# Patient Record
Sex: MALE | Race: AMERICAN INDIAN OR ALASKA NATIVE | NOT HISPANIC OR LATINO | Employment: OTHER | URBAN - NONMETROPOLITAN AREA
[De-identification: names, ages, dates, MRNs, and addresses within clinical notes are randomized per-mention and may not be internally consistent; named-entity substitution may affect disease eponyms.]

---

## 2021-08-19 ENCOUNTER — HOSPITAL ENCOUNTER (INPATIENT)
Facility: OTHER | Age: 66
LOS: 11 days | Discharge: NURSING, REHAB OR LONG-TERM CARE | End: 2021-08-30
Attending: INTERNAL MEDICINE | Admitting: INTERNAL MEDICINE
Payer: COMMERCIAL

## 2021-08-19 DIAGNOSIS — Z89.511 STATUS POST BELOW-KNEE AMPUTATION OF RIGHT LOWER EXTREMITY (H): ICD-10-CM

## 2021-08-19 DIAGNOSIS — L97.415 DIABETIC ULCER OF RIGHT MIDFOOT ASSOCIATED WITH TYPE 2 DIABETES MELLITUS, WITH MUSCLE INVOLVEMENT WITHOUT EVIDENCE OF NECROSIS (H): Primary | ICD-10-CM

## 2021-08-19 DIAGNOSIS — E11.621 DIABETIC ULCER OF RIGHT MIDFOOT ASSOCIATED WITH TYPE 2 DIABETES MELLITUS, WITH MUSCLE INVOLVEMENT WITHOUT EVIDENCE OF NECROSIS (H): Primary | ICD-10-CM

## 2021-08-19 DIAGNOSIS — E11.621 TYPE 2 DIABETES MELLITUS WITH FOOT ULCER, WITH LONG-TERM CURRENT USE OF INSULIN (H): ICD-10-CM

## 2021-08-19 DIAGNOSIS — L97.509 TYPE 2 DIABETES MELLITUS WITH FOOT ULCER, WITH LONG-TERM CURRENT USE OF INSULIN (H): ICD-10-CM

## 2021-08-19 DIAGNOSIS — Z79.4 TYPE 2 DIABETES MELLITUS WITH FOOT ULCER, WITH LONG-TERM CURRENT USE OF INSULIN (H): ICD-10-CM

## 2021-08-19 PROBLEM — L97.529 DIABETIC ULCER OF LEFT FOOT (H): Status: ACTIVE | Noted: 2021-08-19

## 2021-08-19 LAB
CREAT SERPL-MCNC: 0.93 MG/DL (ref 0.7–1.3)
GFR SERPL CREATININE-BSD FRML MDRD: 86 ML/MIN/1.73M2

## 2021-08-19 PROCEDURE — 36415 COLL VENOUS BLD VENIPUNCTURE: CPT | Performed by: INTERNAL MEDICINE

## 2021-08-19 PROCEDURE — 250N000012 HC RX MED GY IP 250 OP 636 PS 637: Performed by: INTERNAL MEDICINE

## 2021-08-19 PROCEDURE — 250N000013 HC RX MED GY IP 250 OP 250 PS 637: Performed by: INTERNAL MEDICINE

## 2021-08-19 PROCEDURE — 82565 ASSAY OF CREATININE: CPT | Performed by: INTERNAL MEDICINE

## 2021-08-19 PROCEDURE — 120N000001 HC R&B MED SURG/OB

## 2021-08-19 PROCEDURE — 99223 1ST HOSP IP/OBS HIGH 75: CPT | Performed by: INTERNAL MEDICINE

## 2021-08-19 PROCEDURE — 250N000011 HC RX IP 250 OP 636: Performed by: INTERNAL MEDICINE

## 2021-08-19 PROCEDURE — 258N000003 HC RX IP 258 OP 636: Performed by: INTERNAL MEDICINE

## 2021-08-19 RX ORDER — ONDANSETRON 4 MG/1
4 TABLET, ORALLY DISINTEGRATING ORAL EVERY 6 HOURS PRN
Status: DISCONTINUED | OUTPATIENT
Start: 2021-08-19 | End: 2021-08-28

## 2021-08-19 RX ORDER — LIDOCAINE 40 MG/G
CREAM TOPICAL
Status: DISCONTINUED | OUTPATIENT
Start: 2021-08-19 | End: 2021-08-28

## 2021-08-19 RX ORDER — CLOPIDOGREL BISULFATE 75 MG/1
75 TABLET ORAL DAILY
Status: DISCONTINUED | OUTPATIENT
Start: 2021-08-20 | End: 2021-08-29

## 2021-08-19 RX ORDER — ACETAMINOPHEN 325 MG/1
650 TABLET ORAL EVERY 4 HOURS PRN
Status: DISCONTINUED | OUTPATIENT
Start: 2021-08-19 | End: 2021-08-23

## 2021-08-19 RX ORDER — SODIUM CHLORIDE 9 MG/ML
INJECTION, SOLUTION INTRAVENOUS CONTINUOUS
Status: DISCONTINUED | OUTPATIENT
Start: 2021-08-19 | End: 2021-08-19

## 2021-08-19 RX ORDER — DEXTROSE MONOHYDRATE 25 G/50ML
25-50 INJECTION, SOLUTION INTRAVENOUS
Status: DISCONTINUED | OUTPATIENT
Start: 2021-08-19 | End: 2021-08-30 | Stop reason: HOSPADM

## 2021-08-19 RX ORDER — CITALOPRAM HYDROBROMIDE 10 MG/1
10 TABLET ORAL DAILY
Status: DISCONTINUED | OUTPATIENT
Start: 2021-08-20 | End: 2021-08-20

## 2021-08-19 RX ORDER — NICOTINE POLACRILEX 4 MG
15-30 LOZENGE BUCCAL
Status: DISCONTINUED | OUTPATIENT
Start: 2021-08-19 | End: 2021-08-30 | Stop reason: HOSPADM

## 2021-08-19 RX ORDER — METOPROLOL SUCCINATE 25 MG/1
25 TABLET, EXTENDED RELEASE ORAL AT BEDTIME
Status: DISCONTINUED | OUTPATIENT
Start: 2021-08-19 | End: 2021-08-30 | Stop reason: HOSPADM

## 2021-08-19 RX ORDER — ATORVASTATIN CALCIUM 40 MG/1
40 TABLET, FILM COATED ORAL EVERY EVENING
Status: DISCONTINUED | OUTPATIENT
Start: 2021-08-19 | End: 2021-08-30 | Stop reason: HOSPADM

## 2021-08-19 RX ORDER — FAMOTIDINE 20 MG/1
20 TABLET, FILM COATED ORAL 2 TIMES DAILY
Status: DISCONTINUED | OUTPATIENT
Start: 2021-08-19 | End: 2021-08-28

## 2021-08-19 RX ORDER — SODIUM CHLORIDE 9 MG/ML
INJECTION, SOLUTION INTRAVENOUS CONTINUOUS
Status: DISCONTINUED | OUTPATIENT
Start: 2021-08-19 | End: 2021-08-22

## 2021-08-19 RX ORDER — ONDANSETRON 2 MG/ML
4 INJECTION INTRAMUSCULAR; INTRAVENOUS EVERY 6 HOURS PRN
Status: DISCONTINUED | OUTPATIENT
Start: 2021-08-19 | End: 2021-08-28

## 2021-08-19 RX ORDER — TAMSULOSIN HYDROCHLORIDE 0.4 MG/1
0.4 CAPSULE ORAL DAILY
Status: DISCONTINUED | OUTPATIENT
Start: 2021-08-20 | End: 2021-08-30 | Stop reason: HOSPADM

## 2021-08-19 RX ADMIN — FAMOTIDINE 20 MG: 20 TABLET ORAL at 19:52

## 2021-08-19 RX ADMIN — METOPROLOL SUCCINATE 25 MG: 25 TABLET, EXTENDED RELEASE ORAL at 20:15

## 2021-08-19 RX ADMIN — ENOXAPARIN SODIUM 40 MG: 100 INJECTION SUBCUTANEOUS at 22:37

## 2021-08-19 RX ADMIN — TAZOBACTAM SODIUM AND PIPERACILLIN SODIUM 3.38 G: 375; 3 INJECTION, SOLUTION INTRAVENOUS at 22:39

## 2021-08-19 RX ADMIN — ATORVASTATIN CALCIUM 40 MG: 40 TABLET, FILM COATED ORAL at 20:15

## 2021-08-19 RX ADMIN — ACETAMINOPHEN 650 MG: 325 TABLET, FILM COATED ORAL at 22:56

## 2021-08-19 RX ADMIN — INSULIN ASPART 6 UNITS: 100 INJECTION, SOLUTION INTRAVENOUS; SUBCUTANEOUS at 22:56

## 2021-08-19 RX ADMIN — INSULIN DETEMIR 25 UNITS: 100 INJECTION, SOLUTION SUBCUTANEOUS at 22:37

## 2021-08-19 RX ADMIN — SODIUM CHLORIDE: 9 INJECTION, SOLUTION INTRAVENOUS at 19:52

## 2021-08-19 ASSESSMENT — ACTIVITIES OF DAILY LIVING (ADL): ADLS_ACUITY_SCORE: 17

## 2021-08-19 ASSESSMENT — MIFFLIN-ST. JEOR: SCORE: 1555.63

## 2021-08-19 NOTE — PROGRESS NOTES
[facility] ADMISSION NOTE    Patient admitted to room 316 at approximately 1818 via cart from [facility]. Patient was accompanied by transport tech.     Verbal SBAR report received from Thuan Silva RN prior to patient arrival.     Patient ambulated to bed with stand-by assist. Patient alert and oriented X 3. Pain is controlled without any medications.  . Admission vital signs: Blood pressure (!) 148/82, pulse 66, temperature 98.6  F (37  C), temperature source Tympanic, resp. rate 16, SpO2 98 %. Patient was oriented to plan of care, call light, bed controls, tv, telephone, bathroom and visiting hours.     Risk Assessment    The following safety risks were identified during admission: fall. Yellow risk band applied: YES.     Skin Initial Assessment    This writer admitted this patient and completed a full skin assessment and Seferino score in the Adult PCS flowsheet. Appropriate interventions initiated as needed.            Education    Patient has a [place] to Observation order: No  Observation education completed and documented: No      Sushma Dalal RN

## 2021-08-19 NOTE — PROGRESS NOTES
Nurse to nurse received from [facility] at [phone].  Sushma Dalal RN on 8/19/2021 at 6:15 PM

## 2021-08-20 LAB
ANION GAP SERPL CALCULATED.3IONS-SCNC: 6 MMOL/L (ref 3–14)
BUN SERPL-MCNC: 17 MG/DL (ref 7–25)
CALCIUM SERPL-MCNC: 8.3 MG/DL (ref 8.6–10.3)
CHLORIDE BLD-SCNC: 102 MMOL/L (ref 98–107)
CO2 SERPL-SCNC: 27 MMOL/L (ref 21–31)
CREAT SERPL-MCNC: 0.89 MG/DL (ref 0.7–1.3)
CRP SERPL-MCNC: 107 MG/L
ERYTHROCYTE [DISTWIDTH] IN BLOOD BY AUTOMATED COUNT: 12.5 % (ref 10–15)
GFR SERPL CREATININE-BSD FRML MDRD: 90 ML/MIN/1.73M2
GLUCOSE BLD-MCNC: 110 MG/DL (ref 70–105)
HBA1C MFR BLD: 11.1 % (ref 4–6.2)
HCT VFR BLD AUTO: 31.4 % (ref 40–53)
HGB BLD-MCNC: 10.5 G/DL (ref 13.3–17.7)
MCH RBC QN AUTO: 29.2 PG (ref 26.5–33)
MCHC RBC AUTO-ENTMCNC: 33.4 G/DL (ref 31.5–36.5)
MCV RBC AUTO: 87 FL (ref 78–100)
PLATELET # BLD AUTO: 223 10E3/UL (ref 150–450)
POTASSIUM BLD-SCNC: 3.9 MMOL/L (ref 3.5–5.1)
RBC # BLD AUTO: 3.6 10E6/UL (ref 4.4–5.9)
SODIUM SERPL-SCNC: 135 MMOL/L (ref 134–144)
VANCOMYCIN SERPL-MCNC: 12.4 MG/L
WBC # BLD AUTO: 10.7 10E3/UL (ref 4–11)

## 2021-08-20 PROCEDURE — 250N000013 HC RX MED GY IP 250 OP 250 PS 637: Performed by: INTERNAL MEDICINE

## 2021-08-20 PROCEDURE — 85027 COMPLETE CBC AUTOMATED: CPT | Performed by: INTERNAL MEDICINE

## 2021-08-20 PROCEDURE — 99232 SBSQ HOSP IP/OBS MODERATE 35: CPT | Performed by: INTERNAL MEDICINE

## 2021-08-20 PROCEDURE — 80202 ASSAY OF VANCOMYCIN: CPT | Performed by: INTERNAL MEDICINE

## 2021-08-20 PROCEDURE — 0HDMXZZ EXTRACTION OF RIGHT FOOT SKIN, EXTERNAL APPROACH: ICD-10-PCS | Performed by: SURGERY

## 2021-08-20 PROCEDURE — 36415 COLL VENOUS BLD VENIPUNCTURE: CPT | Performed by: INTERNAL MEDICINE

## 2021-08-20 PROCEDURE — 99223 1ST HOSP IP/OBS HIGH 75: CPT | Mod: 25 | Performed by: SURGERY

## 2021-08-20 PROCEDURE — 250N000011 HC RX IP 250 OP 636: Performed by: INTERNAL MEDICINE

## 2021-08-20 PROCEDURE — 86140 C-REACTIVE PROTEIN: CPT | Performed by: INTERNAL MEDICINE

## 2021-08-20 PROCEDURE — 258N000003 HC RX IP 258 OP 636: Performed by: INTERNAL MEDICINE

## 2021-08-20 PROCEDURE — 120N000001 HC R&B MED SURG/OB

## 2021-08-20 PROCEDURE — 80048 BASIC METABOLIC PNL TOTAL CA: CPT | Performed by: INTERNAL MEDICINE

## 2021-08-20 PROCEDURE — 83036 HEMOGLOBIN GLYCOSYLATED A1C: CPT | Performed by: INTERNAL MEDICINE

## 2021-08-20 RX ORDER — OXYCODONE HYDROCHLORIDE 5 MG/1
5 TABLET ORAL EVERY 4 HOURS PRN
Status: DISCONTINUED | OUTPATIENT
Start: 2021-08-20 | End: 2021-08-22

## 2021-08-20 RX ORDER — ESCITALOPRAM OXALATE 10 MG/1
20 TABLET ORAL DAILY
Status: DISCONTINUED | OUTPATIENT
Start: 2021-08-21 | End: 2021-08-30 | Stop reason: HOSPADM

## 2021-08-20 RX ORDER — TAMSULOSIN HYDROCHLORIDE 0.4 MG/1
0.4 CAPSULE ORAL DAILY
COMMUNITY

## 2021-08-20 RX ORDER — INSULIN ASPART 100 [IU]/ML
INJECTION, SOLUTION INTRAVENOUS; SUBCUTANEOUS
Status: ON HOLD | COMMUNITY
End: 2021-08-30

## 2021-08-20 RX ORDER — ESCITALOPRAM OXALATE 20 MG/1
20 TABLET ORAL DAILY
COMMUNITY

## 2021-08-20 RX ORDER — METOPROLOL SUCCINATE 25 MG/1
25 TABLET, EXTENDED RELEASE ORAL DAILY
COMMUNITY

## 2021-08-20 RX ORDER — ACETAMINOPHEN 325 MG/1
325 TABLET ORAL 4 TIMES DAILY PRN
COMMUNITY

## 2021-08-20 RX ORDER — NALOXONE HYDROCHLORIDE 0.4 MG/ML
0.2 INJECTION, SOLUTION INTRAMUSCULAR; INTRAVENOUS; SUBCUTANEOUS
Status: DISCONTINUED | OUTPATIENT
Start: 2021-08-20 | End: 2021-08-30 | Stop reason: HOSPADM

## 2021-08-20 RX ORDER — ATORVASTATIN CALCIUM 20 MG/1
20 TABLET, FILM COATED ORAL DAILY
COMMUNITY

## 2021-08-20 RX ORDER — NALOXONE HYDROCHLORIDE 0.4 MG/ML
0.4 INJECTION, SOLUTION INTRAMUSCULAR; INTRAVENOUS; SUBCUTANEOUS
Status: DISCONTINUED | OUTPATIENT
Start: 2021-08-20 | End: 2021-08-30 | Stop reason: HOSPADM

## 2021-08-20 RX ORDER — CLOPIDOGREL BISULFATE 75 MG/1
75 TABLET ORAL DAILY
COMMUNITY

## 2021-08-20 RX ORDER — MAGNESIUM OXIDE 400 MG/1
400 TABLET ORAL 2 TIMES DAILY
COMMUNITY

## 2021-08-20 RX ORDER — NITROGLYCERIN 0.4 MG/1
0.4 TABLET SUBLINGUAL EVERY 5 MIN PRN
COMMUNITY

## 2021-08-20 RX ORDER — METFORMIN HCL 500 MG
500 TABLET, EXTENDED RELEASE 24 HR ORAL
COMMUNITY

## 2021-08-20 RX ORDER — UREA 10 %
1000 LOTION (ML) TOPICAL DAILY
COMMUNITY

## 2021-08-20 RX ORDER — ASPIRIN 81 MG/1
81 TABLET ORAL DAILY
COMMUNITY

## 2021-08-20 RX ADMIN — ACETAMINOPHEN 650 MG: 325 TABLET, FILM COATED ORAL at 17:43

## 2021-08-20 RX ADMIN — ACETAMINOPHEN 650 MG: 325 TABLET, FILM COATED ORAL at 11:13

## 2021-08-20 RX ADMIN — INSULIN DETEMIR 25 UNITS: 100 INJECTION, SOLUTION SUBCUTANEOUS at 21:12

## 2021-08-20 RX ADMIN — FAMOTIDINE 20 MG: 20 TABLET ORAL at 21:12

## 2021-08-20 RX ADMIN — CITALOPRAM HYDROBROMIDE 10 MG: 10 TABLET ORAL at 11:13

## 2021-08-20 RX ADMIN — SODIUM CHLORIDE: 9 INJECTION, SOLUTION INTRAVENOUS at 06:10

## 2021-08-20 RX ADMIN — TAZOBACTAM SODIUM AND PIPERACILLIN SODIUM 3.38 G: 375; 3 INJECTION, SOLUTION INTRAVENOUS at 03:43

## 2021-08-20 RX ADMIN — TAZOBACTAM SODIUM AND PIPERACILLIN SODIUM 3.38 G: 375; 3 INJECTION, SOLUTION INTRAVENOUS at 21:15

## 2021-08-20 RX ADMIN — ENOXAPARIN SODIUM 40 MG: 100 INJECTION SUBCUTANEOUS at 21:12

## 2021-08-20 RX ADMIN — TAZOBACTAM SODIUM AND PIPERACILLIN SODIUM 3.38 G: 375; 3 INJECTION, SOLUTION INTRAVENOUS at 11:13

## 2021-08-20 RX ADMIN — ATORVASTATIN CALCIUM 40 MG: 40 TABLET, FILM COATED ORAL at 20:10

## 2021-08-20 RX ADMIN — OXYCODONE HYDROCHLORIDE 5 MG: 5 TABLET ORAL at 15:46

## 2021-08-20 RX ADMIN — TAMSULOSIN HYDROCHLORIDE 0.4 MG: 0.4 CAPSULE ORAL at 11:13

## 2021-08-20 RX ADMIN — VANCOMYCIN HYDROCHLORIDE 1000 MG: 1 INJECTION, POWDER, LYOPHILIZED, FOR SOLUTION INTRAVENOUS at 16:35

## 2021-08-20 RX ADMIN — OXYCODONE HYDROCHLORIDE 5 MG: 5 TABLET ORAL at 20:10

## 2021-08-20 RX ADMIN — VANCOMYCIN HYDROCHLORIDE 1000 MG: 1 INJECTION, POWDER, LYOPHILIZED, FOR SOLUTION INTRAVENOUS at 04:30

## 2021-08-20 RX ADMIN — TAZOBACTAM SODIUM AND PIPERACILLIN SODIUM 3.38 G: 375; 3 INJECTION, SOLUTION INTRAVENOUS at 15:47

## 2021-08-20 RX ADMIN — CLOPIDOGREL BISULFATE 75 MG: 75 TABLET, FILM COATED ORAL at 11:13

## 2021-08-20 RX ADMIN — METOPROLOL SUCCINATE 25 MG: 25 TABLET, EXTENDED RELEASE ORAL at 21:11

## 2021-08-20 RX ADMIN — FAMOTIDINE 20 MG: 20 TABLET ORAL at 11:13

## 2021-08-20 RX ADMIN — SODIUM CHLORIDE: 9 INJECTION, SOLUTION INTRAVENOUS at 17:47

## 2021-08-20 RX ADMIN — ACETAMINOPHEN 650 MG: 325 TABLET, FILM COATED ORAL at 06:06

## 2021-08-20 ASSESSMENT — ACTIVITIES OF DAILY LIVING (ADL)
ADLS_ACUITY_SCORE: 17

## 2021-08-20 NOTE — PROGRESS NOTES
:    Met with patient in room to discuss discharge planning needs. It was mentioned that patients mother is currently on dialysis and is elderly.  Patient stated there are several others at the house to take care of his mother if needed.   Patient was not worried or concerned for her.  Patient may go and stay with his daughter in [place] for awhile at discharge.  Anticipated discharge in a few days.  No further needs at this time.    JERILYN Donald on 8/20/2021 at 1:38 PM

## 2021-08-20 NOTE — PHARMACY-ADMISSION MEDICATION HISTORY
Pharmacy -- Admission Medication Reconciliation    Prior to admission (PTA) medications were reviewed and the patient's PTA medication list was updated.    Sources Consulted: patient interview, [facility] refill history, sure scripts, chart review    The reliability of this Medication Reconciliation is: Reliability: Borderline reliable    The following significant changes were made:    Added acetaminophen    Added augmentin--prescribed 8/13, has been compliant    Added aspirin    Added atorvastatin    Added escitalopram    Added Novolog    Added Levemir    Added mag ox    Added metformin    Added metoprolol    Added mvi (per S, it is a renal vitamin--dialyvite?)    Added tamsulosin    Added omeprazole    Added nitrostat    Added vitamin b12    Added vitamin b complex    **patient tells me he was told to stop clopidogrel last year but does not know from whom.  Per cardiology note from 10/20, he is to be on it lifelong.  Left note for MD to refill upon discharge, pleased educate patient as well.  Added it back onto medication list even though he hasn't been taking it so it gets continued upon discharge.        In addition, the patient's allergies were reviewed with the patient and updated as follows:   Allergies: Patient has no known allergies.    The pharmacist has reviewed with the patient that all personal medications should be removed from the building or locked in the belongings safe.  Patient shall only take medications ordered by the physician and administered by the nursing staff.       Medication barriers identified: not taking plavix as prescribed   Medication adherence concerns: yes, refill history not up to date on many medications (30 day supplies filled April/May for atorvastatin, metoprolol, Levemir, metformin, novolog)   Understanding of emergency medications: unknown if he is checking blood sugars at home, supplies not filled recently    Sissy Marie RPH, 8/20/2021,  10:10 AM     Aspirin dose  changed to reflect [facility] records - 81 mg daily - as this is where patient is followed for cardiology per discussion with hospitalist.     Rosina Carcamo [facility] on 8/24/2021 at 1:47 PM

## 2021-08-20 NOTE — PHARMACY-VANCOMYCIN DOSING SERVICE
"Pharmacy Vancomycin Note  Date of Service 2021  Patient's  1955   66 year old, male    Indication: Skin and Soft Tissue Infection  Day of Therapy: Day 2  Current vancomycin regimen:  1,000 mg IV q12h  Current vancomycin monitoring method: AUC  Current vancomycin therapeutic monitoring goal: 400-600 mg*h/L    Current estimated CrCl = Estimated Creatinine Clearance: 85.3 mL/min (based on SCr of 0.89 mg/dL).    Creatinine for last 3 days  2021:  7:46 PM Creatinine 0.93 mg/dL  2021:  5:31 AM Creatinine 0.89 mg/dL    Recent Vancomycin Levels (past 3 days)  2021: 12:15 PM Vancomycin 12.4 mg/L    Vancomycin IV Administrations (past 72 hours)                   vancomycin (VANCOCIN) 1,000 mg in sodium chloride 0.9 % 250 mL intermittent infusion (mg) 1,000 mg New Bag 21 0430                Nephrotoxins and other renal medications (From now, onward)    Start     Dose/Rate Route Frequency Ordered Stop    21 2200  piperacillin-tazobactam (ZOSYN) infusion 3.375 g     Note to Pharmacy: For [facility], [facility] and [facility]: For Zosyn-naive patients, use the \"Zosyn initial dose + extended infusion\" order panel.    3.375 g  100 mL/hr over 30 Minutes Intravenous EVERY 6 HOURS 21 1903      21 0400  vancomycin (VANCOCIN) 1,000 mg in sodium chloride 0.9 % 250 mL intermittent infusion      1,000 mg  over 90 Minutes Intravenous EVERY 12 HOURS 21 1920               Contrast Orders - past 72 hours (72h ago, onward)    None          Interpretation of levels and current regimen:  Vancomycin level is reflective of -600    Has serum creatinine changed greater than 50% in last 72 hours: No    Urine output:  good urine output    Renal Function: Stable    Loading dose: N/A  Regimen: 1000 mg IV every 12 hours.  Start time: 16:30 on 2021  Exposure target: AUC24 (range)400-600 mg/L.hr   AUC24,ss: 513 mg/L.hr  Probability of AUC24 > 400: 86 %  Ctrough,ss: 16.6 mg/L  Probability of Ctrough,ss " > 20: 28 %  Probability of nephrotoxicity (Lodise PAUL 2009): 12 %      Plan:  1. Continue Current Dose  2. Vancomycin monitoring method: AUC  3. Vancomycin therapeutic monitoring goal: 400-600 mg*h/L  4. Pharmacy will check vancomycin levels as appropriate in 1-3 Days.  5. Serum creatinine levels will be ordered daily for the first week of therapy and at least twice weekly for subsequent weeks.    Faisal Rodriguez RPH

## 2021-08-20 NOTE — H&P
"[facility]    History and Physical - Hospitalist Service       Date of Admission:  8/19/2021    Assessment & Plan      Adam Duarte is a 66 year old male admitted on 8/19/2021. He has a diabetic foot ulcer.    Principal Problem:    Diabetic ulcer of left foot (H)  Assessment: present on admission, transfer from [place]. ED provider obtained a xray there which shows no \"osteomyelitis\". Concern it is present. No improvement on 6 days of augmentin. Admitted for failed outpatient treatment.   Plan: Admit, IV vancomycin and zosyn. Consult general surgery for debridement. Consider MRI to look for osteomyelitis.    Active Problems:    Coronary atherosclerosis  Assessment: chronic, stable      DMII (diabetes mellitus, type 2) (H)  Assessment: hemoglobin A1c 12.0 in 9/2020. Chronic poor control  Plan: resume normal insulin regimen, monitor closely      Essential hypertension  Assessment: chronic  Plan: metoprolol      GERD (gastroesophageal reflux disease)  Assessment: chronic  Plan: pepcid      S/P CABG (coronary artery bypass graft)  Assessment: on chronic plavix         Diet:   diabetic  DVT Prophylaxis: Enoxaparin (Lovenox) SQ  Hale Catheter: Not present  Central Lines: None  Code Status: Full Code      Disposition Plan   Expected discharge: 2-3 days, recommended to prior living arrangement once antibiotic plan established.     The patient's care was discussed with the Patient.    Tucker Copeland MD  [facility]  Securely message with the Vocera Web Console (learn more here)  Text page via [employer] Paging/Directory      ______________________________________________________________________    Chief Complaint   Foot drainage    History is obtained from the patient    History of Present Illness   Adam Duarte is a 66 year old male who presents to the [facility] emergency department with worsening left foot cellulitis. He has purulent drainage coming between his toes and " a large ulcer over the dorsum of his foot. He says that his shoe was wearing on his foot and that is what started the ulcer. 6 days ago he was started on Augmentin and the condition has worsened. He notes chills at home and the foul smell from his foot. He states he has sensation in his feet. He has diabetes. His last hemoglobin A1c that I can find is from September 2020 and is 12.0. He says he is compliant with his insulin. Thuan Silva is again on divert status, I was contacted by their emergency department and accepted the patient in transfer.    Review of Systems    CONSTITUTIONAL:POSITIVE  for chills and sweats  INTEGUMENTARY/SKIN: POSITIVE for ulceration foot left and generalized  EYES: NEGATIVE for vision changes or irritation  ENT/MOUTH: NEGATIVE for ear, mouth and throat problems  RESP: NEGATIVE for significant cough or SOB  CV: NEGATIVE for chest pain, palpitations or peripheral edema  GI: NEGATIVE for nausea, abdominal pain, heartburn, or change in bowel habits  : NEGATIVE for frequency, dysuria, or hematuria  MUSCULOSKELETAL: NEGATIVE for significant arthralgias or myalgia  NEURO: NEGATIVE for numbness or tingling in bilateral lower extremity   ENDOCRINE: NEGATIVE for temperature intolerance, skin/hair changes  HEME: NEGATIVE for bleeding problems  PSYCHIATRIC: NEGATIVE for changes in mood or affect    Past Medical History    I have reviewed this patient's medical history and updated it with pertinent information if needed.   Past Medical History:   Diagnosis Date     Coronary atherosclerosis 12/30/2014     Diabetic ulcer of left foot (H) 8/19/2021     DMII (diabetes mellitus, type 2) (H) 10/18/2013     Essential hypertension 10/18/2013    Formatting of this note might be different from the original. IMO Update     GERD (gastroesophageal reflux disease) 9/11/2013     S/P CABG (coronary artery bypass graft) 4/28/2015       Past Surgical History   I have reviewed this patient's surgical history and updated  it with pertinent information if needed.  Past Surgical History:   Procedure Laterality Date     AS CABG, ARTERIAL, THREE         Social History   I have reviewed this patient's social history and updated it with pertinent information if needed.  Social History     Tobacco Use     Smoking status: None   Substance Use Topics     Alcohol use: None     Drug use: None       Family History   I have reviewed this patient's family history and updated it with pertinent information if needed.  Family History   Problem Relation Age of Onset     Coronary Artery Disease Father        Prior to Admission Medications   None     Allergies   No Known Allergies    Physical Exam   Vital Signs: Temp: 98.6  F (37  C) Temp src: Tympanic BP: (!) 148/82 Pulse: 66   Resp: 16 SpO2: 98 % O2 Device: None (Room air)    Weight: 177 lbs 14.58 oz    Constitutional: In no apparent distress  Eyes: pupils reactive, extraocular movements intact. Anicteric sclera.   HEENT: TM's normal, oropharynx nonerythematous. Neck supple, no JVD.  Respiratory: no crackles noted, no wheezes.  Cardiovascular: regular, no murmur. Mild left lower extremity swelling  GI: soft, non-tender, bowel sounds present.  Lymph/Hematologic: no cervical or supraclavicular LAD.  Genitourinary: deferred  Skin: foul smelling purulent drainage between left 1st and 2nd and 2nd and 3rd toes. Erythema and desquamation of epidermal layer off of dorsum of midfoot.   Musculoskeletal: no joint erythema or swelling  Neurologic: cranial nerves symmetric. Neuro exam nonfocal. Sensation in feet intact.  Psychiatric: alert and oriented x3. Interactive.       Data   Data reviewed today: I reviewed all medications, new labs and imaging results over the last 24 hours. I personally reviewed lab and imaging results from [place].    WBC 11.4  hemoglobin 11.0  PLTS 223    Sodium 131  K 4.2  BUN 19  creatinine 0.91  CRP 10.5  Lactate 0.9    XR FOOT RIGHT 3 OR MORE VIEWS       CLINICAL HISTORY:  right  foot infection;         FINDINGS: No acute fracture or dislocation. No focal cortical destruction, or periosteal reaction. There is diffuse vascular calcification.     Degenerative changes are noted in the hindfoot, and mid and forefoot. There is a tiny plantar calcaneal spur.     IMPRESSION:   1. No evidence of acute osteomyelitis.   2. Degenerative changes.

## 2021-08-20 NOTE — PROGRESS NOTES
"[facility]    Medicine Progress Note - Hospitalist Service       Date of Admission:  8/19/2021    Assessment & Plan              Adam Duarte is a 66 year old male admitted on 8/19/2021. He has a diabetic foot ulcer.    Principal Problem:    Diabetic ulcer of left foot (H)  Assessment: present on admission, transfer from [place]. ED provider obtained a xray there which shows no \"osteomyelitis\". Concern it is present. No improvement on 6 days of augmentin. Admitted for failed outpatient treatment. Consulted general surgery for debridement - thank you. Per conversation with surgery, more superficial than deep. No tracking.   Plan: IV vancomycin and zosyn day 2.     Active Problems:    Coronary atherosclerosis  Assessment: chronic, stable      DMII (diabetes mellitus, type 2) (H)  Assessment: hemoglobin A1c 12.0 in 9/2020, 11.1 today. Chronic poor control, but good control this AM.   Plan: resume normal insulin regimen, monitor closely      Essential hypertension  Assessment: chronic  Plan: metoprolol      GERD (gastroesophageal reflux disease)  Assessment: chronic  Plan: pepcid      S/P CABG (coronary artery bypass graft)  Assessment: on chronic plavix       Diet: Consistent Carb 60 grams CHO per Meal Diet    DVT Prophylaxis: Enoxaparin (Lovenox) SQ  Hale Catheter: Not present  Central Lines: None  Code Status: Full Code      Disposition Plan   Expected discharge: 2-3 days recommended to prior living arrangement once antibiotic plan established.     The patient's care was discussed with the Patient.    Tucker Copeland MD  Hospitalist Service  [facility]  Securely message with the Vocera Web Console (learn more here)  Text page via [employer] Paging/Directory        Interval History   Feels well, pain controlled.     Data reviewed today: I reviewed all medications, new labs and imaging results over the last 24 hours. I personally reviewed no images or EKG's " today.    Physical Exam   Vital Signs: Temp: 98.1  F (36.7  C) Temp src: Tympanic BP: 134/56 Pulse: 72   Resp: 16 SpO2: 97 % O2 Device: None (Room air)    Weight: 177 lbs 14.58 oz  GENERAL: Comfortable, no apparent distress.  CARDIOVASCULAR: regular rate and rhythm, no murmur. No lower extremity edema   RESPIRATORY: Clear to auscultation bilaterally, no wheezes or crackles.  GI: non-tender, non-distended, normal bowel sounds.   SKIN: multiple shallow legs ulcers on anterior tibia, present on admission. Heel ulcer with clean dressing from surgeon, did not take it off.       Data   Recent Labs   Lab 08/20/21  0531 08/19/21  1946   WBC 10.7  --    HGB 10.5*  --    MCV 87  --      --      --    POTASSIUM 3.9  --    CHLORIDE 102  --    CO2 27  --    BUN 17  --    CR 0.89 0.93   ANIONGAP 6  --    AMAURY 8.3*  --    *  --      Medications     sodium chloride 100 mL/hr at 08/20/21 0610       atorvastatin  40 mg Oral QPM     citalopram  10 mg Oral Daily     clopidogrel  75 mg Oral Daily     enoxaparin ANTICOAGULANT  40 mg Subcutaneous Q24H     famotidine  20 mg Oral BID     insulin aspart  10 Units Subcutaneous TID w/meals     insulin aspart  1-10 Units Subcutaneous TID AC     insulin aspart  1-7 Units Subcutaneous At Bedtime     insulin detemir  25 Units Subcutaneous At Bedtime     insulin detemir  30 Units Subcutaneous QAM AC     metoprolol succinate ER  25 mg Oral At Bedtime     piperacillin-tazobactam  3.375 g Intravenous Q6H     sodium chloride (PF)  3 mL Intracatheter Q8H     tamsulosin  0.4 mg Oral Daily     vancomycin  1,000 mg Intravenous Q12H

## 2021-08-20 NOTE — CONSULTS
GENERAL SURGERY CONSULTATION NOTE    Adam Duarte   [address] [address]   [facility] [address]  66 year old  male    Primary Care Provider:  Gilberto Castillo      HPI: Adam Duarte presents to hospital with progressive right foot wound.  Patient is a diabetic and states that he got a wound on his foot due to some poorly fitting shoes.  The wound got infected and slowly worsened.  Patient was on a course of oral antibiotics, but this did not stop the wound from getting bigger.  He was transferred from an outside facility for wound cares and IV antibiotics.  Patient states he felt feverish yesterday denies shaking chills.  States his energy is good.  Says the foot feels a little bit swollen and is slightly painful.  He still has some sensation in his feet.  Denies problems with chronic wounds on feet.  Hemoglobin A1c is 11.1.    REVIEW OF SYSTEMS:    GENERAL: No fevers or chills. Denies fatigue, recent weight loss.  HEENT: No sinus drainage. No changes with vision or hearing. No difficulty swallowing.   LYMPHATICS:  Noswollen nodes in axilla, neck or groin.  CARDIOVASCULAR: Denies chest pain, palpitations and dyspnea on exertion.  PULMONARY: No shortness of breath or cough. No increase in sputum production.  GI: Denies melena,bright red blood in stools. No hematemesis. No constipation or diarrhea.  : No dysuria or hematuria.  SKIN: Right foot skin infection  HEMATOLOGY:  No history of easy bruising or bleeding.  ENDOCRINE:  No history of diabetes or thyroid problems.  NEUROLOGY:  No history of seizures or headaches. No motor or sensory changes.        Patient Active Problem List   Diagnosis     Diabetic ulcer of left foot (H)     Coronary atherosclerosis     DMII (diabetes mellitus, type 2) (H)     Essential hypertension     GERD (gastroesophageal reflux disease)     S/P CABG (coronary artery bypass graft)       Past Medical History:   Diagnosis Date     Coronary atherosclerosis 12/30/2014     Diabetic  ulcer of left foot (H) 8/19/2021     DMII (diabetes mellitus, type 2) (H) 10/18/2013     Essential hypertension 10/18/2013    Formatting of this note might be different from the original. IMO Update     GERD (gastroesophageal reflux disease) 9/11/2013     S/P CABG (coronary artery bypass graft) 4/28/2015       Past Surgical History:   Procedure Laterality Date     AS CABG, ARTERIAL, THREE         Family History   Problem Relation Age of Onset     Coronary Artery Disease Father        Social History     Social History Narrative     Not on file       Social History     Socioeconomic History     Marital status:      Spouse name: Not on file     Number of children: Not on file     Years of education: Not on file     Highest education level: Not on file   Occupational History     Not on file   Tobacco Use     Smoking status: Not on file   Substance and Sexual Activity     Alcohol use: Not on file     Drug use: Not on file     Sexual activity: Not on file   Other Topics Concern     Not on file   Social History Narrative     Not on file     Social Determinants of Health     Financial Resource Strain:      Difficulty of Paying Living Expenses:    Food Insecurity:      Worried About Running Out of Food in the Last Year:      Ran Out of Food in the Last Year:    Transportation Needs:      Lack of Transportation (Medical):      Lack of Transportation (Non-Medical):    Physical Activity:      Days of Exercise per Week:      Minutes of Exercise per Session:    Stress:      Feeling of Stress :    Social Connections:      Frequency of Communication with Friends and Family:      Frequency of Social Gatherings with Friends and Family:      Attends Holiness Services:      Active Member of Clubs or Organizations:      Attends Club or Organization Meetings:      Marital Status:    Intimate Partner Violence:      Fear of Current or Ex-Partner:      Emotionally Abused:      Physically Abused:      Sexually Abused:        No  "current facility-administered medications on file prior to encounter.  acetaminophen (TYLENOL) 325 MG tablet, Take 325 mg by mouth 4 times daily as needed for mild pain  amoxicillin-clavulanate (AUGMENTIN) 875-125 MG tablet, Take 1 tablet by mouth 2 times daily  aspirin 81 MG EC tablet, Take 162 mg by mouth daily  atorvastatin (LIPITOR) 20 MG tablet, Take 20 mg by mouth daily  cyanocobalamin (VITAMIN B-12) 500 MCG tablet, Take 1,000 mcg by mouth daily  escitalopram (LEXAPRO) 20 MG tablet, Take 20 mg by mouth daily  insulin aspart (NOVOLOG FLEXPEN) 100 UNIT/ML pen, Inject 12 units subcutaneously with breakfast, 12 units with lunch and 14 units with dinner  insulin detemir (LEVEMIR PEN) 100 UNIT/ML pen, Inject 30 Units Subcutaneous every morning And inject 25 units subcutaneously at bedtime  magnesium oxide (MAG-OX) 400 MG tablet, Take 400 mg by mouth 2 times daily  metFORMIN (GLUCOPHAGE-XR) 500 MG 24 hr tablet, Take 500 mg by mouth daily (with dinner)  metoprolol succinate ER (TOPROL-XL) 25 MG 24 hr tablet, Take 25 mg by mouth daily  Multiple Vitamin (MULTIVITAMIN ADULT PO), Take 1 tablet by mouth daily  omeprazole (PRILOSEC) 20 MG DR capsule, Take 20 mg by mouth daily  tamsulosin (FLOMAX) 0.4 MG capsule, Take 0.4 mg by mouth daily  vitamin B-Complex, Take 1 tablet by mouth daily  clopidogrel (PLAVIX) 75 MG tablet, Take 75 mg by mouth daily  nitroGLYcerin (NITROSTAT) 0.4 MG sublingual tablet, Place 0.4 mg under the tongue every 5 minutes as needed for chest pain For chest pain place 1 tablet under the tongue every 5 minutes for 3 doses. If symptoms persist 5 minutes after 1st dose call 911.          ALLERGIES/SENSITIVITIES: No Known Allergies    PHYSICAL EXAM:     /56   Pulse 72   Temp 98.1  F (36.7  C) (Tympanic)   Resp 16   Ht 1.702 m (5' 7\")   Wt 80.7 kg (177 lb 14.6 oz)   SpO2 97%   BMI 27.86 kg/m      General Appearance:   Sitting up in the chair, no apparent distress  HEENT: Pupils are equal and " reactive, no scleral icterus,   Heart & CV:  RRR no murmur.  Intact distal pulses, good cap refill.  LUNGS: No increased work of breathing.Lugns are CTA B/L, no wheezing or crackles.  Abd: Soft, nontender, nondistended  Ext: There is a open wound on the top of the right foot from the midfoot extending down to the base of the first second and third toes.  There is a thick overlying eschar and foul odor.  The eschar was easily unroofed and there was some grossly nonviable tissues underneath.  These were wiped away and there is subcutaneous fat visible underneath.  The wound was washed with a dilute iodine solution and covered with a saline and iodine moistened 4 x 4 and wrapped with clean Kerlix.  I found no drainable fluid collection  Neuro: Alert and oriented, normal speech and mentation.  Some remaining sensation in the toes    Labs are reviewed and are significant for sodium 135, potassium 3.9, creatinine 0.89, , hemoglobin A1c 11.1, glucose 110, WBC 10.7, hemoglobin 10.5, platelet 223        CONSULTATION ASSESSMENT AND PLAN:    66 year old male with infected diabetic foot wound.  Patient is hemodynamically stable at present.  We will continue IV antibiotics and daily dressing changes here until the patient is afebrile for at least 24 hours and wound looks better and we have a plan for wound cares.    Continue IV antibiotics  Continue with daily dressing changes with saline moistened wet-to-dry dressing.  Surgery will follow        Ralph Subramanian MD on 8/20/2021 at 12:26 PM

## 2021-08-20 NOTE — PLAN OF CARE
Pt admitted last evening from [facility] with a diabetic R foot ulcer. Wet-to-dry dressing change placed. Orange size blackened area to top of R foot near toes, wound wraps between first two toes. Green slough noted and odor. Pain with touch and movement. Walks on heel with steady gait. Believes the wound is from tight fitting shoes. Pea size scab on first and second toes to L foot, dusky toes, and cap refill >3 seconds.     Long standing diabetic. Pt states he has not been checking his blood sugar at home because he hasn't gotten around to it. Lives with his mother who is on dialysis 3x/week. Pleasant and agreeable to plan of care.

## 2021-08-20 NOTE — PHARMACY-VANCOMYCIN DOSING SERVICE
"Pharmacy Vancomycin Initial Note  Date of Service 2021  Patient's  1955  66 year old, male    Indication: Skin and Soft Tissue Infection    Current estimated CrCl = CrCl cannot be calculated (No successful lab value found.).    Creatinine for last 3 days  No results found for requested labs within last 72 hours.    Recent Vancomycin Level(s) for last 3 days  No results found for requested labs within last 72 hours.      Vancomycin IV Administrations (past 72 hours)      No vancomycin orders with administrations in past 72 hours.                Nephrotoxins and other renal medications (From now, onward)    Start     Dose/Rate Route Frequency Ordered Stop    21 2200  piperacillin-tazobactam (ZOSYN) infusion 3.375 g     Note to Pharmacy: For [facility], [facility] and [facility]: For Zosyn-naive patients, use the \"Zosyn initial dose + extended infusion\" order panel.    3.375 g  100 mL/hr over 30 Minutes Intravenous EVERY 6 HOURS 21 1903      21 0400  vancomycin (VANCOCIN) 1,000 mg in sodium chloride 0.9 % 250 mL intermittent infusion      1,000 mg  over 90 Minutes Intravenous EVERY 12 HOURS 21 1920            Contrast Orders - past 72 hours (72h ago, onward)    None          Loading dose: N/A  Regimen: 1000 mg IV every 12 hours.  Start time: 04:39 on 2021  Exposure target: AUC24 (range)400-600 mg/L.hr   AUC24,ss: 532 mg/L.hr  Probability of AUC24 > 400: 79 %  Ctrough,ss: 17.1 mg/L  Probability of Ctrough,ss > 20: 36 %  Probability of nephrotoxicity (Lodise PAUL ): 13 %          Plan:  1. Start vancomycin  1000 mg IV q12h.   Patient received a load of 1250 mg x 1 at outside facility.  2. Vancomycin monitoring method: AUC  3. Vancomycin therapeutic monitoring goal: 400-600 mg*h/L  4. Pharmacy will check vancomycin levels as appropriate in 1-3 Days.    5. Serum creatinine levels will be ordered daily for the first week of therapy and at least twice weekly for subsequent weeks.      Sissy" Frank, RPH

## 2021-08-21 PROCEDURE — 258N000003 HC RX IP 258 OP 636: Performed by: INTERNAL MEDICINE

## 2021-08-21 PROCEDURE — 120N000001 HC R&B MED SURG/OB

## 2021-08-21 PROCEDURE — 250N000013 HC RX MED GY IP 250 OP 250 PS 637: Performed by: SURGERY

## 2021-08-21 PROCEDURE — 99232 SBSQ HOSP IP/OBS MODERATE 35: CPT | Performed by: INTERNAL MEDICINE

## 2021-08-21 PROCEDURE — 99232 SBSQ HOSP IP/OBS MODERATE 35: CPT | Mod: 25 | Performed by: SURGERY

## 2021-08-21 PROCEDURE — 250N000013 HC RX MED GY IP 250 OP 250 PS 637: Performed by: INTERNAL MEDICINE

## 2021-08-21 PROCEDURE — 250N000011 HC RX IP 250 OP 636: Performed by: INTERNAL MEDICINE

## 2021-08-21 RX ORDER — CLINDAMYCIN HCL 150 MG
300 CAPSULE ORAL EVERY 6 HOURS SCHEDULED
Status: DISCONTINUED | OUTPATIENT
Start: 2021-08-21 | End: 2021-08-24

## 2021-08-21 RX ORDER — SODIUM HYPOCHLORITE 2.5 MG/ML
SOLUTION TOPICAL ONCE
Status: DISCONTINUED | OUTPATIENT
Start: 2021-08-21 | End: 2021-08-22

## 2021-08-21 RX ORDER — SODIUM HYPOCHLORITE 2.5 MG/ML
SOLUTION TOPICAL 2 TIMES DAILY
Status: DISCONTINUED | OUTPATIENT
Start: 2021-08-21 | End: 2021-08-30 | Stop reason: HOSPADM

## 2021-08-21 RX ORDER — AMOXICILLIN 250 MG
1 CAPSULE ORAL 2 TIMES DAILY PRN
Status: DISCONTINUED | OUTPATIENT
Start: 2021-08-21 | End: 2021-08-30 | Stop reason: HOSPADM

## 2021-08-21 RX ORDER — CIPROFLOXACIN 500 MG/1
500 TABLET, FILM COATED ORAL 2 TIMES DAILY
Status: DISCONTINUED | OUTPATIENT
Start: 2021-08-21 | End: 2021-08-24

## 2021-08-21 RX ADMIN — OXYCODONE HYDROCHLORIDE 5 MG: 5 TABLET ORAL at 08:00

## 2021-08-21 RX ADMIN — CLOPIDOGREL BISULFATE 75 MG: 75 TABLET, FILM COATED ORAL at 11:07

## 2021-08-21 RX ADMIN — VANCOMYCIN HYDROCHLORIDE 1000 MG: 1 INJECTION, POWDER, LYOPHILIZED, FOR SOLUTION INTRAVENOUS at 04:39

## 2021-08-21 RX ADMIN — ATORVASTATIN CALCIUM 40 MG: 40 TABLET, FILM COATED ORAL at 21:18

## 2021-08-21 RX ADMIN — CLINDAMYCIN HYDROCHLORIDE 300 MG: 150 CAPSULE ORAL at 12:36

## 2021-08-21 RX ADMIN — FAMOTIDINE 20 MG: 20 TABLET ORAL at 11:07

## 2021-08-21 RX ADMIN — CLINDAMYCIN HYDROCHLORIDE 300 MG: 150 CAPSULE ORAL at 17:51

## 2021-08-21 RX ADMIN — ACETAMINOPHEN 650 MG: 325 TABLET, FILM COATED ORAL at 11:06

## 2021-08-21 RX ADMIN — ENOXAPARIN SODIUM 40 MG: 100 INJECTION SUBCUTANEOUS at 21:17

## 2021-08-21 RX ADMIN — CIPROFLOXACIN HYDROCHLORIDE 500 MG: 500 TABLET, FILM COATED ORAL at 11:07

## 2021-08-21 RX ADMIN — TAMSULOSIN HYDROCHLORIDE 0.4 MG: 0.4 CAPSULE ORAL at 11:07

## 2021-08-21 RX ADMIN — METOPROLOL SUCCINATE 25 MG: 25 TABLET, EXTENDED RELEASE ORAL at 21:17

## 2021-08-21 RX ADMIN — INSULIN DETEMIR 25 UNITS: 100 INJECTION, SOLUTION SUBCUTANEOUS at 21:17

## 2021-08-21 RX ADMIN — OXYCODONE HYDROCHLORIDE 5 MG: 5 TABLET ORAL at 16:06

## 2021-08-21 RX ADMIN — HYOSCYAMINE SULFATE: 16 SOLUTION at 21:20

## 2021-08-21 RX ADMIN — CIPROFLOXACIN HYDROCHLORIDE 500 MG: 500 TABLET, FILM COATED ORAL at 21:21

## 2021-08-21 RX ADMIN — SODIUM CHLORIDE: 9 INJECTION, SOLUTION INTRAVENOUS at 21:14

## 2021-08-21 RX ADMIN — TAZOBACTAM SODIUM AND PIPERACILLIN SODIUM 3.38 G: 375; 3 INJECTION, SOLUTION INTRAVENOUS at 03:39

## 2021-08-21 RX ADMIN — ESCITALOPRAM OXALATE 20 MG: 10 TABLET ORAL at 11:07

## 2021-08-21 RX ADMIN — OXYCODONE HYDROCHLORIDE 5 MG: 5 TABLET ORAL at 19:57

## 2021-08-21 RX ADMIN — FAMOTIDINE 20 MG: 20 TABLET ORAL at 21:18

## 2021-08-21 RX ADMIN — CLINDAMYCIN HYDROCHLORIDE 300 MG: 150 CAPSULE ORAL at 23:18

## 2021-08-21 ASSESSMENT — ACTIVITIES OF DAILY LIVING (ADL)
ADLS_ACUITY_SCORE: 17

## 2021-08-21 NOTE — PROGRESS NOTES
"[facility]    Medicine Progress Note - Hospitalist Service       Date of Admission:  8/19/2021    Assessment & Plan                         Adam Duarte is a 66 year old male admitted on 8/19/2021. He has a diabetic foot ulcer.    Principal Problem:    Diabetic ulcer of left foot (H)  Assessment: present on admission, transfer from [place]. ED provider obtained a xray there which shows no \"osteomyelitis\". Concern it is present. No improvement on 6 days of augmentin. Admitted for failed outpatient treatment.    Plan: Switch to cipro/clinda per discussion with surgery, monitor for fever. RITA as outpatient.    Active Problems:    Coronary atherosclerosis  Assessment: chronic, stable      DMII (diabetes mellitus, type 2) (H)  Assessment: hemoglobin A1c 11.1. Chronic poor control, but good control impatient.   Plan: resume normal insulin regimen, monitor closely      Essential hypertension  Assessment: chronic  Plan: metoprolol      GERD (gastroesophageal reflux disease)  Assessment: chronic  Plan: pepcid      S/P CABG (coronary artery bypass graft)  Assessment: on chronic plavix         Diet: Consistent Carb 60 grams CHO per Meal Diet    DVT Prophylaxis: Enoxaparin (Lovenox) SQ  Hale Catheter: Not present  Central Lines: None  Code Status: Full Code      Disposition Plan   Expected discharge: 1-2 days, recommended to prior living arrangement once antibiotic plan established.     The patient's care was discussed with the Patient.    Tucker Copeland MD  Hospitalist Service  [facility]  Securely message with the Vocera Web Console (learn more here)  Text page via [employer] Paging/Directory          Interval History   Feeling OK. Increased swelling today.     Data reviewed today: I reviewed all medications, new labs and imaging results over the last 24 hours. I personally reviewed no images or EKG's today.    Physical Exam   Vital Signs: Temp: 98.6  F (37  C) Temp src: " Tympanic BP: (!) 162/74 Pulse: 75   Resp: 16 SpO2: 95 % O2 Device: None (Room air)    Weight: 177 lbs 14.58 oz  GENERAL: Comfortable, no apparent distress.  CARDIOVASCULAR: regular rate and rhythm, no murmur. No lower extremity edema   RESPIRATORY: Clear to auscultation bilaterally, no wheezes or crackles.  GI: non-tender, non-distended, normal bowel sounds.   SKIN: erythema. No pus      Data   Recent Labs   Lab 08/20/21  0531 08/19/21  1946   WBC 10.7  --    HGB 10.5*  --    MCV 87  --      --      --    POTASSIUM 3.9  --    CHLORIDE 102  --    CO2 27  --    BUN 17  --    CR 0.89 0.93   ANIONGAP 6  --    AMAURY 8.3*  --    *  --      Medications     sodium chloride 100 mL/hr at 08/20/21 1747       atorvastatin  40 mg Oral QPM     ciprofloxacin  500 mg Oral BID     clindamycin  300 mg Oral Q6H JANET     clopidogrel  75 mg Oral Daily     enoxaparin ANTICOAGULANT  40 mg Subcutaneous Q24H     escitalopram  20 mg Oral Daily     famotidine  20 mg Oral BID     insulin aspart  10 Units Subcutaneous TID w/meals     insulin aspart  1-10 Units Subcutaneous TID AC     insulin aspart  1-7 Units Subcutaneous At Bedtime     insulin detemir  25 Units Subcutaneous At Bedtime     insulin detemir  30 Units Subcutaneous QAM AC     metoprolol succinate ER  25 mg Oral At Bedtime     sodium chloride (PF)  3 mL Intracatheter Q8H     sodium hypochlorite   Irrigation Once     sodium hypochlorite   Irrigation BID     tamsulosin  0.4 mg Oral Daily

## 2021-08-21 NOTE — PROGRESS NOTES
GENERAL SURGERY PROGRESS NOTE  8/21/2021      Interval history:   No acute events overnight. Pain is moderately well controlled. Tolerating dressing changes. Denies fevers or chills.     Physical Exam:   Vital signs are reviewed and there are no significant abnormalities.     UOP over past 24 hours is 700 mL    General: laying in bed, appears comfortable   MSK: shallow ulcer on the top of the right foot 5 x 4.3 cm and a smaller ulcer 1.0 x 2.2 cm just lateral to this, connected by a skin bridge, the skin bridge appears threatened and may slough. Pitting edema up to the ankle.   Strong, biphasic pulse on the right PT with doppler, weaker, but present, doppler signal at the DP.  Neuro: alert and oriented     No new labs     No new imaging       Assessment / Plan:   Adam Duarte is a 66 year old male with diabetic foot ulcer. Patient is afebrile and hemodynamically stable. Wound appears stable.     Twice daily dressing changes wet-to-dry using Dakin's solution  Switch to oral antibiotics, cipro / clinda   Keep foot elevated to mitigate swelling   Probably should have RITA done at some point         SHAHRZAD Subramanian MD   8/21/2021

## 2021-08-21 NOTE — PROGRESS NOTES
"Patient is alert and orientated. Mild to moderate pain to right foot, prn tylenol and oxycodone given. Lungs clear throughout, dim in bases. Heart regular. Mild edema to bilat ankles and feet, elevated as tolerated. Reported mild tingling to right foot, paco and slightly dusky, has sensation, dressing changed by MD, CDI. Weak left pedal pulse. Weak right posterior tibial pulse with doppler. Voiding without difficulty. VSS. Will continue to monitor.    /60   Pulse 62   Temp 98.4  F (36.9  C) (Tympanic)   Resp 16   Ht 1.702 m (5' 7\")   Wt 80.7 kg (177 lb 14.6 oz)   SpO2 96%   BMI 27.86 kg/m        "

## 2021-08-21 NOTE — PROGRESS NOTES
"Patient is alert and orientated. Pain to right foot, prn oxycodone and tylenol given, and elevated as tolerated. Lungs clear throughout, dim in bases. Heart regular. Trace edema to bilat feet. Ralph feet. Right foot dressing changed at 2100, see note. CMS intact. Voiding without difficulty. Hypertensive but vitally stable. Independent in room, educated to call if he feels weak, dizzy, or lightheaded. Will continue to monitor.    BP (!) 173/81   Pulse 66   Temp 97.4  F (36.3  C) (Tympanic)   Resp 16   Ht 1.702 m (5' 7\")   Wt 80.7 kg (177 lb 14.6 oz)   SpO2 98%   BMI 27.86 kg/m      "

## 2021-08-22 LAB
CREAT SERPL-MCNC: 0.93 MG/DL (ref 0.7–1.3)
GFR SERPL CREATININE-BSD FRML MDRD: 86 ML/MIN/1.73M2
PLATELET # BLD AUTO: 268 10E3/UL (ref 150–450)

## 2021-08-22 PROCEDURE — 250N000013 HC RX MED GY IP 250 OP 250 PS 637: Performed by: SURGERY

## 2021-08-22 PROCEDURE — 82565 ASSAY OF CREATININE: CPT | Performed by: INTERNAL MEDICINE

## 2021-08-22 PROCEDURE — 85049 AUTOMATED PLATELET COUNT: CPT | Performed by: INTERNAL MEDICINE

## 2021-08-22 PROCEDURE — 120N000001 HC R&B MED SURG/OB

## 2021-08-22 PROCEDURE — 36415 COLL VENOUS BLD VENIPUNCTURE: CPT | Performed by: INTERNAL MEDICINE

## 2021-08-22 PROCEDURE — 36415 COLL VENOUS BLD VENIPUNCTURE: CPT | Performed by: FAMILY MEDICINE

## 2021-08-22 PROCEDURE — 250N000011 HC RX IP 250 OP 636: Performed by: INTERNAL MEDICINE

## 2021-08-22 PROCEDURE — 99232 SBSQ HOSP IP/OBS MODERATE 35: CPT | Mod: 25 | Performed by: SURGERY

## 2021-08-22 PROCEDURE — 250N000013 HC RX MED GY IP 250 OP 250 PS 637: Performed by: INTERNAL MEDICINE

## 2021-08-22 PROCEDURE — 87040 BLOOD CULTURE FOR BACTERIA: CPT | Performed by: FAMILY MEDICINE

## 2021-08-22 PROCEDURE — 99233 SBSQ HOSP IP/OBS HIGH 50: CPT | Performed by: FAMILY MEDICINE

## 2021-08-22 PROCEDURE — 250N000013 HC RX MED GY IP 250 OP 250 PS 637: Performed by: FAMILY MEDICINE

## 2021-08-22 RX ORDER — OXYCODONE HYDROCHLORIDE 5 MG/1
5-10 TABLET ORAL EVERY 4 HOURS PRN
Status: DISCONTINUED | OUTPATIENT
Start: 2021-08-22 | End: 2021-08-30 | Stop reason: HOSPADM

## 2021-08-22 RX ADMIN — ENOXAPARIN SODIUM 40 MG: 100 INJECTION SUBCUTANEOUS at 21:03

## 2021-08-22 RX ADMIN — Medication 15 G: at 16:34

## 2021-08-22 RX ADMIN — CIPROFLOXACIN HYDROCHLORIDE 500 MG: 500 TABLET, FILM COATED ORAL at 10:47

## 2021-08-22 RX ADMIN — CLINDAMYCIN HYDROCHLORIDE 300 MG: 150 CAPSULE ORAL at 12:49

## 2021-08-22 RX ADMIN — CLINDAMYCIN HYDROCHLORIDE 300 MG: 150 CAPSULE ORAL at 23:30

## 2021-08-22 RX ADMIN — CLINDAMYCIN HYDROCHLORIDE 300 MG: 150 CAPSULE ORAL at 18:48

## 2021-08-22 RX ADMIN — METOPROLOL SUCCINATE 25 MG: 25 TABLET, EXTENDED RELEASE ORAL at 21:03

## 2021-08-22 RX ADMIN — OXYCODONE HYDROCHLORIDE 5 MG: 5 TABLET ORAL at 01:05

## 2021-08-22 RX ADMIN — DOCUSATE SODIUM 50 MG AND SENNOSIDES 8.6 MG 1 TABLET: 8.6; 5 TABLET, FILM COATED ORAL at 21:03

## 2021-08-22 RX ADMIN — ESCITALOPRAM OXALATE 20 MG: 10 TABLET ORAL at 10:47

## 2021-08-22 RX ADMIN — HYOSCYAMINE SULFATE: 16 SOLUTION at 21:04

## 2021-08-22 RX ADMIN — Medication 15 G: at 11:29

## 2021-08-22 RX ADMIN — OXYCODONE HYDROCHLORIDE 5 MG: 5 TABLET ORAL at 19:25

## 2021-08-22 RX ADMIN — OXYCODONE HYDROCHLORIDE 10 MG: 5 TABLET ORAL at 23:30

## 2021-08-22 RX ADMIN — ATORVASTATIN CALCIUM 40 MG: 40 TABLET, FILM COATED ORAL at 21:19

## 2021-08-22 RX ADMIN — ACETAMINOPHEN 650 MG: 325 TABLET, FILM COATED ORAL at 16:36

## 2021-08-22 RX ADMIN — FAMOTIDINE 20 MG: 20 TABLET ORAL at 21:03

## 2021-08-22 RX ADMIN — HYOSCYAMINE SULFATE: 16 SOLUTION at 10:49

## 2021-08-22 RX ADMIN — OXYCODONE HYDROCHLORIDE 5 MG: 5 TABLET ORAL at 05:40

## 2021-08-22 RX ADMIN — ACETAMINOPHEN 650 MG: 325 TABLET, FILM COATED ORAL at 10:47

## 2021-08-22 RX ADMIN — CLOPIDOGREL BISULFATE 75 MG: 75 TABLET, FILM COATED ORAL at 10:47

## 2021-08-22 RX ADMIN — OXYCODONE HYDROCHLORIDE 5 MG: 5 TABLET ORAL at 12:49

## 2021-08-22 RX ADMIN — CIPROFLOXACIN HYDROCHLORIDE 500 MG: 500 TABLET, FILM COATED ORAL at 21:03

## 2021-08-22 RX ADMIN — Medication 15 G: at 16:57

## 2021-08-22 RX ADMIN — CLINDAMYCIN HYDROCHLORIDE 300 MG: 150 CAPSULE ORAL at 05:31

## 2021-08-22 RX ADMIN — ACETAMINOPHEN 650 MG: 325 TABLET, FILM COATED ORAL at 05:31

## 2021-08-22 RX ADMIN — TAMSULOSIN HYDROCHLORIDE 0.4 MG: 0.4 CAPSULE ORAL at 10:47

## 2021-08-22 RX ADMIN — FAMOTIDINE 20 MG: 20 TABLET ORAL at 10:47

## 2021-08-22 ASSESSMENT — MIFFLIN-ST. JEOR: SCORE: 1593.68

## 2021-08-22 ASSESSMENT — ACTIVITIES OF DAILY LIVING (ADL)
ADLS_ACUITY_SCORE: 17

## 2021-08-22 NOTE — PROGRESS NOTES
"[facility]    Medicine Progress Note - Hospitalist Service  Date of Admission:  8/19/2021    Assessment & Plan          Adam Duarte is a 66 year old male admitted on 8/19/2021. He has a diabetic foot ulcer.    Principal Problem:  Diabetic ulcer of right foot (H) got zosyn and vanco in ER. Now on clinda/cipro.  Assessment: present on admission, transfer from [place]. ED provider obtained a xray there which shows no \"osteomyelitis\". Concern it is present. No improvement on 6 days of augmentin. Admitted for failed outpatient treatment.    -RITA as outpatient.  -No blood cultures or wound cultures have been drawn that I can see from review of his ER report.  Given concern for possible osteomyelitis and the inability at this time to get blood cultures or wound cultures as he has been on antibiotic therapy we will check an MRI of the foot to exclude osteo.   -continue cipro/clinda  -Dakin's solution with wet to dry dressing changes    Coronary atherosclerosis  Assessment: chronic, stable    DMII (diabetes mellitus, type 2) (H)  Assessment: hemoglobin A1c 11.1. Chronic poor control, but good control inpatient.  Patient tells me that he normally eats a lot of sweets at home and does not always check his blood sugar  Plan: resume normal insulin regimen, monitor closely  -We will have him check blood sugars 3-4 times daily at home and have home health care follow-up to make adjustments in his medication regimen as needed  Suspect he will need higher doses and more education. There is a nephew who lives with him could also help with ongoing management of his diabetes    Essential hypertension, slightly elevated here even on metoprolol.  He does use alcohol.  Question if some of his elevated blood pressures are due to alcohol use or withdrawals.  No significant signs of withdrawal here.  Assessment: chronic  -metoprolol  -May need additional antihypertensive medication.    GERD (gastroesophageal " reflux disease)  Assessment: chronic  Plan: pepcid    S/P CABG (coronary artery bypass graft)  Assessment: on chronic plavix and statin         Diet: Consistent Carb 60 grams CHO per Meal Diet    DVT Prophylaxis: Enoxaparin (Lovenox) SQ  Hale Catheter: Not present  Central Lines: None  Code Status: Full Code      Disposition Plan   Expected discharge: 1-2 days, recommended to prior living arrangement once antibiotic plan established.     The patient's care was discussed with the Patient.    Rosalva Solitario DO  Hospitalist Service  [facility]  Securely message with the Vocera Web Console (learn more here)  Text page via [employer] Paging/Directory      Interval History   No new complaints today.  Leg is little bit painful.  Was evaluated by surgery team this morning with no new surgical intervention recommended.  He has not had any fever since admission.  Normally lives at home with some family.  No chest pain or shortness of breath.    Data reviewed today: I reviewed all medications, new labs and imaging results over the last 24 hours. I personally reviewed no images or EKG's today.    Physical Exam   Vital Signs: Temp: 98.7  F (37.1  C) Temp src: Tympanic BP: 125/53 Pulse: 63   Resp: 16 SpO2: 95 % O2 Device: None (Room air)    Weight: 186 lbs 4.8 oz  GENERAL: Comfortable, no apparent distress.  CARDIOVASCULAR: regular rate and rhythm, no murmur.  Mild lower extremity edema right greater than left.  RESPIRATORY: Clear to auscultation bilaterally, no wheezes or crackles.  GI: non-tender, non-distended, normal bowel sounds.   SKIN: erythema. No pus bilateral chronic vascular venous stasis changes with some mild erythema of the right lower leg.      Data   Recent Labs   Lab 08/22/21  0616 08/20/21  0531 08/19/21  1946   WBC  --  10.7  --    HGB  --  10.5*  --    MCV  --  87  --     223  --    NA  --  135  --    POTASSIUM  --  3.9  --    CHLORIDE  --  102  --    CO2  --  27  --    BUN  --  17   --    CR 0.93 0.89 0.93   ANIONGAP  --  6  --    AMAURY  --  8.3*  --    GLC  --  110*  --      Medications       atorvastatin  40 mg Oral QPM     ciprofloxacin  500 mg Oral BID     clindamycin  300 mg Oral Q6H JANET     clopidogrel  75 mg Oral Daily     enoxaparin ANTICOAGULANT  40 mg Subcutaneous Q24H     escitalopram  20 mg Oral Daily     famotidine  20 mg Oral BID     insulin aspart  10 Units Subcutaneous TID w/meals     insulin aspart  1-10 Units Subcutaneous TID AC     insulin aspart  1-7 Units Subcutaneous At Bedtime     insulin detemir  25 Units Subcutaneous At Bedtime     insulin detemir  30 Units Subcutaneous QAM AC     metoprolol succinate ER  25 mg Oral At Bedtime     sodium chloride (PF)  3 mL Intracatheter Q8H     sodium hypochlorite   Irrigation Once     sodium hypochlorite   Irrigation BID     tamsulosin  0.4 mg Oral Daily

## 2021-08-22 NOTE — PLAN OF CARE
"Pt had low grade temp 99.9, prn tylenol given per pt request, B/P slightly elevated, scheduled HTN medications given. Lungs diminished in bilateral bases, denies sob, O2 96% on RA.  Ulcer on right foot moist,pinkish/red with black spotting cleansed and new Dakins wet to dry dressing placed on foot and first 3 toes, scant serosanguineous drainage present. Pain rated 7/10 in right foot, PRN oxycodone given, see MAR.  BLE dusky, paco, +2 edema present in feet, pedal pulses weak doppler used. Pt had legs elevated in recliner when up.  Pt anxious r/t discharge and dressing changes, would like to see if someone could come to his home to help, social work consult put in, will continue to monitor.      BP (!) 159/65 (BP Location: Right arm)   Pulse 72   Temp 99.9  F (37.7  C) (Tympanic)   Resp 16   Ht 1.702 m (5' 7\")   Wt 84.5 kg (186 lb 4.8 oz)   SpO2 95%   BMI 29.18 kg/m                  0530- blood glucose 57, pt given juice and toast, recheck 104, will continue to monitor. Carmine Weaver RN on 8/22/2021 at 5:48 AM    "

## 2021-08-22 NOTE — PROGRESS NOTES
GENERAL SURGERY PROGRESS NOTE  8/22/2021      Interval history:   No acute events overnight. Pain is moderately well controlled. Tolerating dressing changes. Denies fevers or chills.     Physical Exam:   Vital signs are reviewed and there are no significant abnormalities.     UOP over past 24 hours is 525mL    General: laying in bed, appears comfortable   MSK: shallow ulcer on the top of the right foot 5 x 4.3 cm and a smaller ulcer 1.0 x 2.2 cm just lateral to this, connected by a skin bridge, the skin bridge appears threatened and may slough. Pitting edema up to the ankle.   Strong, biphasic pulse on the right PT with doppler, weaker, but present, doppler signal at the DP.  Neuro: alert and oriented     No new labs     No new imaging       Assessment / Plan:   Adam Duarte is a 66 year old male with diabetic foot ulcer. Patient is afebrile and hemodynamically stable. Wound appears stable.     Twice daily dressing changes wet-to-dry using Dakin's solution  Continue oral antibiotics, cipro / clinda   Keep foot elevated to mitigate swelling   Probably should have RITA done at some point   Consult social work to set up home health to assist with wound cares      SHAHRZAD Subramanian MD   8/22/2021       negative...

## 2021-08-22 NOTE — PROGRESS NOTES
"Patient is alert and orientated. Mild to moderate right foot pain, prn oxycodone and tylenol given. Lungs clear throughout, dim in bases. Heart regular. Ralph/dusky BLE, weak left pedal pulse, weak right popliteal pulse. Poor cap refill to BLE. Mild BLE edema, legs elevated as tolerated. Right foot wound dressing was changed by MD this morning, CDI. VSS. Independent in room, educated to call if he feels weak, lightheaded, or dizzy. Will continue to monitor.    BP (!) 150/67 (BP Location: Right arm)   Pulse 62   Temp 97.4  F (36.3  C) (Tympanic)   Resp 16   Ht 1.702 m (5' 7\")   Wt 84.5 kg (186 lb 4.8 oz)   SpO2 98%   BMI 29.18 kg/m          Episode of hypoglycemia, gave 4oz orange juice and 15g of prn glucose gel. Doctor Solitario notified, no new orders at this time, we will continue to monitor blood sugars.   08/22/21 1110 08/22/21 1126 08/22/21 1147   Point of Care Testing   Puncture Site fingertip fingertip fingertip   Bedside Glucose (mg/dl )  42 mg/dl 66 mg/dl 150 mg/dl     Another episode of hypoglycemia occurred, but asymptomatic, gave 15g prn glucose gel and patient eating dinner. Doctor Solitario notified and insulin was adjusted to sliding scale novolog only. Will continue to monitor blood sugars.    08/22/21 1632 08/22/21 1654 08/22/21 1745   Point of Care Testing   Puncture Site fingertip fingertip fingertip   Bedside Glucose (mg/dl )  52 mg/dl 49 mg/dl 103 mg/dl     "

## 2021-08-23 ENCOUNTER — APPOINTMENT (OUTPATIENT)
Dept: MRI IMAGING | Facility: OTHER | Age: 66
End: 2021-08-23
Attending: FAMILY MEDICINE
Payer: COMMERCIAL

## 2021-08-23 ENCOUNTER — APPOINTMENT (OUTPATIENT)
Dept: GENERAL RADIOLOGY | Facility: OTHER | Age: 66
End: 2021-08-23
Attending: FAMILY MEDICINE
Payer: COMMERCIAL

## 2021-08-23 LAB
ANION GAP SERPL CALCULATED.3IONS-SCNC: 3 MMOL/L (ref 3–14)
BUN SERPL-MCNC: 10 MG/DL (ref 7–25)
CALCIUM SERPL-MCNC: 8.8 MG/DL (ref 8.6–10.3)
CHLORIDE BLD-SCNC: 102 MMOL/L (ref 98–107)
CO2 SERPL-SCNC: 29 MMOL/L (ref 21–31)
CREAT SERPL-MCNC: 0.84 MG/DL (ref 0.7–1.3)
ERYTHROCYTE [DISTWIDTH] IN BLOOD BY AUTOMATED COUNT: 13 % (ref 10–15)
GFR SERPL CREATININE-BSD FRML MDRD: >90 ML/MIN/1.73M2
GLUCOSE BLD-MCNC: 109 MG/DL (ref 70–105)
HCT VFR BLD AUTO: 29.8 % (ref 40–53)
HGB BLD-MCNC: 9.9 G/DL (ref 13.3–17.7)
MCH RBC QN AUTO: 29.2 PG (ref 26.5–33)
MCHC RBC AUTO-ENTMCNC: 33.2 G/DL (ref 31.5–36.5)
MCV RBC AUTO: 88 FL (ref 78–100)
PLATELET # BLD AUTO: 285 10E3/UL (ref 150–450)
POTASSIUM BLD-SCNC: 4 MMOL/L (ref 3.5–5.1)
RBC # BLD AUTO: 3.39 10E6/UL (ref 4.4–5.9)
SODIUM SERPL-SCNC: 134 MMOL/L (ref 134–144)
WBC # BLD AUTO: 8.5 10E3/UL (ref 4–11)

## 2021-08-23 PROCEDURE — 36415 COLL VENOUS BLD VENIPUNCTURE: CPT | Performed by: FAMILY MEDICINE

## 2021-08-23 PROCEDURE — 250N000011 HC RX IP 250 OP 636: Performed by: INTERNAL MEDICINE

## 2021-08-23 PROCEDURE — 120N000001 HC R&B MED SURG/OB

## 2021-08-23 PROCEDURE — 73720 MRI LWR EXTREMITY W/O&W/DYE: CPT | Mod: RT

## 2021-08-23 PROCEDURE — 250N000013 HC RX MED GY IP 250 OP 250 PS 637: Performed by: FAMILY MEDICINE

## 2021-08-23 PROCEDURE — 99232 SBSQ HOSP IP/OBS MODERATE 35: CPT | Mod: 25 | Performed by: SURGERY

## 2021-08-23 PROCEDURE — 71045 X-RAY EXAM CHEST 1 VIEW: CPT

## 2021-08-23 PROCEDURE — 250N000013 HC RX MED GY IP 250 OP 250 PS 637: Performed by: INTERNAL MEDICINE

## 2021-08-23 PROCEDURE — 85027 COMPLETE CBC AUTOMATED: CPT | Performed by: FAMILY MEDICINE

## 2021-08-23 PROCEDURE — 80048 BASIC METABOLIC PNL TOTAL CA: CPT | Performed by: FAMILY MEDICINE

## 2021-08-23 PROCEDURE — A9575 INJ GADOTERATE MEGLUMI 0.1ML: HCPCS | Performed by: FAMILY MEDICINE

## 2021-08-23 PROCEDURE — 99233 SBSQ HOSP IP/OBS HIGH 50: CPT | Performed by: FAMILY MEDICINE

## 2021-08-23 PROCEDURE — 250N000013 HC RX MED GY IP 250 OP 250 PS 637: Performed by: SURGERY

## 2021-08-23 PROCEDURE — 255N000002 HC RX 255 OP 636: Performed by: FAMILY MEDICINE

## 2021-08-23 RX ORDER — LISINOPRIL 10 MG/1
10 TABLET ORAL DAILY
Status: DISCONTINUED | OUTPATIENT
Start: 2021-08-23 | End: 2021-08-27

## 2021-08-23 RX ORDER — ACETAMINOPHEN 500 MG
1000 TABLET ORAL EVERY 6 HOURS PRN
Status: DISCONTINUED | OUTPATIENT
Start: 2021-08-23 | End: 2021-08-28

## 2021-08-23 RX ADMIN — OXYCODONE HYDROCHLORIDE 10 MG: 5 TABLET ORAL at 21:46

## 2021-08-23 RX ADMIN — LISINOPRIL 10 MG: 10 TABLET ORAL at 16:37

## 2021-08-23 RX ADMIN — ACETAMINOPHEN 1000 MG: 500 TABLET, FILM COATED ORAL at 08:49

## 2021-08-23 RX ADMIN — FAMOTIDINE 20 MG: 20 TABLET ORAL at 22:19

## 2021-08-23 RX ADMIN — INSULIN DETEMIR 25 UNITS: 100 INJECTION, SOLUTION SUBCUTANEOUS at 22:20

## 2021-08-23 RX ADMIN — OXYCODONE HYDROCHLORIDE 10 MG: 5 TABLET ORAL at 16:37

## 2021-08-23 RX ADMIN — ESCITALOPRAM OXALATE 20 MG: 10 TABLET ORAL at 08:40

## 2021-08-23 RX ADMIN — OXYCODONE HYDROCHLORIDE 10 MG: 5 TABLET ORAL at 11:40

## 2021-08-23 RX ADMIN — CLINDAMYCIN HYDROCHLORIDE 300 MG: 150 CAPSULE ORAL at 17:50

## 2021-08-23 RX ADMIN — ACETAMINOPHEN 1000 MG: 500 TABLET, FILM COATED ORAL at 17:53

## 2021-08-23 RX ADMIN — OXYCODONE HYDROCHLORIDE 5 MG: 5 TABLET ORAL at 05:47

## 2021-08-23 RX ADMIN — HYOSCYAMINE SULFATE: 16 SOLUTION at 22:18

## 2021-08-23 RX ADMIN — FAMOTIDINE 20 MG: 20 TABLET ORAL at 08:40

## 2021-08-23 RX ADMIN — CLINDAMYCIN HYDROCHLORIDE 300 MG: 150 CAPSULE ORAL at 05:37

## 2021-08-23 RX ADMIN — CLINDAMYCIN HYDROCHLORIDE 300 MG: 150 CAPSULE ORAL at 11:40

## 2021-08-23 RX ADMIN — GADOTERATE MEGLUMINE 15 ML: 376.9 INJECTION INTRAVENOUS at 18:11

## 2021-08-23 RX ADMIN — HYOSCYAMINE SULFATE: 16 SOLUTION at 08:56

## 2021-08-23 RX ADMIN — ATORVASTATIN CALCIUM 40 MG: 40 TABLET, FILM COATED ORAL at 22:18

## 2021-08-23 RX ADMIN — CIPROFLOXACIN HYDROCHLORIDE 500 MG: 500 TABLET, FILM COATED ORAL at 22:19

## 2021-08-23 RX ADMIN — TAMSULOSIN HYDROCHLORIDE 0.4 MG: 0.4 CAPSULE ORAL at 08:41

## 2021-08-23 RX ADMIN — METOPROLOL SUCCINATE 25 MG: 25 TABLET, EXTENDED RELEASE ORAL at 22:18

## 2021-08-23 RX ADMIN — CLOPIDOGREL BISULFATE 75 MG: 75 TABLET, FILM COATED ORAL at 08:56

## 2021-08-23 RX ADMIN — ENOXAPARIN SODIUM 40 MG: 100 INJECTION SUBCUTANEOUS at 22:19

## 2021-08-23 RX ADMIN — CIPROFLOXACIN HYDROCHLORIDE 500 MG: 500 TABLET, FILM COATED ORAL at 08:40

## 2021-08-23 ASSESSMENT — ACTIVITIES OF DAILY LIVING (ADL)
ADLS_ACUITY_SCORE: 17

## 2021-08-23 ASSESSMENT — MIFFLIN-ST. JEOR: SCORE: 1588.24

## 2021-08-23 NOTE — PLAN OF CARE
Pt admitted 8/19/21 with R foot ulcer. A&Ox4. R foot dressing changed by Dr. Hernandez. See flowsheet for details. Also monitoring two scabs on left foot. Pt educated need for frequent foot checks. Cap refill to R foot brisk. 2+ edema to R leg. Elevated when in bed and recliner. Ind. In room. Rates pain 7/10. Treated with tylenol and oxycodone, see MAR. Medication somewhat effective. Pt needs reminders to ask for PRN pain medications. Noted to be in pain but only took medications when offered. Pt is quiet and has minimal nonverbal signs of pain.

## 2021-08-23 NOTE — PROGRESS NOTES
"[facility]    Medicine Progress Note - Hospitalist Service  Date of Admission:  8/19/2021    Assessment & Plan          Adam Duarte is a 66 year old male admitted on 8/19/2021. He has a diabetic foot ulcer.    Principal Problem:  Diabetic ulcer of right foot (H) got zosyn and vanco in ER. Now on clinda/cipro.  Assessment: present on admission, transfer from [place]. ED provider obtained a xray there which shows no \"osteomyelitis\". Concern it is present. No improvement on 6 days of augmentin. Admitted for failed outpatient treatment.    -RITA   -possible MRI, had prior heart surgery. If not candidate for MRI will order CT instead.   -No blood cultures or wound cultures have been drawn that I can see from review of his ER report.   -continue cipro/clinda  -Dakin's solution with wet to dry dressing changes    Coronary atherosclerosis  Assessment: chronic, stable    DMII (diabetes mellitus, type 2) (H) with hypoglycemia. Likely due to different diet in hospital.   Assessment: hemoglobin A1c 11.1. Chronic poor control, but good control inpatient.  Patient tells me that he normally eats a lot of sweets at home and does not always check his blood sugar  Plan: resume normal insulin regimen, monitor closely  -We will have him check blood sugars 3-4 times daily at home and have home health care follow-up to make adjustments in his medication regimen as needed  Suspect he will need higher doses and more education. There is a nephew who lives with him could also help with ongoing management of his diabetes    Essential hypertension, slightly elevated here even on metoprolol.  He does use alcohol.  Question if some of his elevated blood pressures are due to alcohol use or withdrawals.  No significant signs of withdrawal here.  Assessment: chronic  -metoprolol  -add lisinopril and monitor renal function and lytes.     GERD (gastroesophageal reflux disease)  Assessment: chronic  Plan: pepcid    S/P " CABG (coronary artery bypass graft)  Assessment: on chronic plavix and statin       Diet: Regular Diet Adult    DVT Prophylaxis: Enoxaparin (Lovenox) SQ  Hale Catheter: Not present  Central Lines: None  Code Status: Full Code      Disposition Plan   Expected discharge: 1-2 days, depending on if he has osteo, antibiotic plan is established and safe disposition identified. May need SNF.      The patient's care was discussed with the Patient.    Rosalva Solitario DO  Hospitalist Service  [facility]  Securely message with the Vocera Web Console (learn more here)  Text page via [employer] Paging/Directory      Interval History   No new complaints today.  Leg is little bit painful.  Was evaluated by surgery team this morning with no new surgical intervention recommended.  He has not had any fever since admission.  Normally lives at home with some family.  No chest pain or shortness of breath.    Data reviewed today: I reviewed all medications, new labs and imaging results over the last 24 hours. I personally reviewed no images or EKG's today.    Physical Exam   Vital Signs: Temp: 99.4  F (37.4  C) Temp src: Tympanic BP: (!) 161/70 Pulse: 76   Resp: 20 SpO2: 95 % O2 Device: None (Room air)    Weight: 185 lbs 1.6 oz  GENERAL: Comfortable, no apparent distress.  CARDIOVASCULAR: regular rate and rhythm, no murmur.  Mild lower extremity edema right greater than left.  RESPIRATORY: Clear to auscultation bilaterally, no wheezes or crackles.  GI: non-tender, non-distended, normal bowel sounds.   SKIN: erythema. No pus bilateral chronic vascular venous stasis changes with some mild erythema of the right lower leg.      Data   Recent Labs   Lab 08/22/21  0616 08/20/21  0531 08/19/21  1946   WBC  --  10.7  --    HGB  --  10.5*  --    MCV  --  87  --     223  --    NA  --  135  --    POTASSIUM  --  3.9  --    CHLORIDE  --  102  --    CO2  --  27  --    BUN  --  17  --    CR 0.93 0.89 0.93   ANIONGAP  --  6  --     AMAURY  --  8.3*  --    GLC  --  110*  --      Medications       atorvastatin  40 mg Oral QPM     ciprofloxacin  500 mg Oral BID     clindamycin  300 mg Oral Q6H JANET     clopidogrel  75 mg Oral Daily     enoxaparin ANTICOAGULANT  40 mg Subcutaneous Q24H     escitalopram  20 mg Oral Daily     famotidine  20 mg Oral BID     insulin aspart  1-7 Units Subcutaneous TID AC     insulin aspart  1-5 Units Subcutaneous At Bedtime     insulin detemir  25 Units Subcutaneous At Bedtime     insulin detemir  30 Units Subcutaneous QAM AC     lisinopril  10 mg Oral Daily     metoprolol succinate ER  25 mg Oral At Bedtime     sodium chloride (PF)  3 mL Intracatheter Q8H     sodium hypochlorite   Irrigation BID     tamsulosin  0.4 mg Oral Daily

## 2021-08-23 NOTE — PROGRESS NOTES
:    Met with patient in room to discuss discharge planning needs.  Patient stated he feels he cannot go home discharge.  I offered patient SNF options and he accepted and chose [facility] as his first choice and [facility] as his second choice.  I made referrals to both for possible placement.  [facility] called back and they cannot accept anyone at this time as they have COVID-19 at the nursing home.  I left message for Angie to continue screening for possible placement at [facility].    Pt/family was given the Medicare Compare list for SNF, with associated star ratings to assist with choice for referrals/discharge planning Yes    Education was given to pt/family that star ratings are updated/maintained by Medicare and can be reviewed by visiting www.medicare.gov Yes    JERILYN Donald on 8/23/2021 at 11:11 AM    :    [facility] called and they can accept patient at discharge.  Anticipated discharge possibly tomorrow to [facility].    JERILYN Donald on 8/23/2021 at 3:28 PM

## 2021-08-23 NOTE — PROGRESS NOTES
GENERAL SURGERY PROGRESS NOTE  8/23/2021      Interval history:   No acute events overnight. Pain is moderately well controlled. Tolerating dressing changes. Denies fevers or chills.     Physical Exam:   Vital signs are reviewed and there are no significant abnormalities.     UOP over past 24 hours is x3    General: laying in bed, appears comfortable   MSK: shallow ulcer on the top of the right foot 5 x 4.3 cm and a smaller ulcer 1.0 x 2.2 cm just lateral to this, connected by a skin bridge, the skin bridge appears threatened and may slough. Pitting edema improving   Strong, biphasic pulse on the right PT with doppler, weaker, but present, doppler signal at the DP.  Neuro: alert and oriented     No new labs     No new imaging       Assessment / Plan:   Adam Duarte is a 66 year old male with diabetic foot ulcer. Patient is afebrile and hemodynamically stable. Wound appears stable.     Twice daily dressing changes wet-to-dry using Dakin's solution  Continue oral antibiotics, cipro / clinda   Keep foot elevated to mitigate swelling   Probably should have RITA done at some point   Consult social work to set up home health to assist with wound cares  MRI today       SHAHRZAD Subramanian MD   8/23/2021

## 2021-08-23 NOTE — PROGRESS NOTES
SAFETY CHECKLIST  ID Bands and Risk clasps correct and in place (DNR, Fall risk, Allergy, Latex, Limb):  Yes  All Lines Reconciled and labeled correctly: Yes  Whiteboard updated:Yes  Environmental interventions (bed/chair alarm on, call light, side rails, restraints, sitter....): Yes     Verify Tele #:   na

## 2021-08-23 NOTE — PLAN OF CARE
"Pt afebrile, B/p elevated at beginning of shift and now WDL. BLE paco/dusky, +2 edema present. Wound on top of right foot golf ball size, base is moist,pinkish/red with slough and black spots present. No drainage was seen and new wet to dry (Dakins)gauze dressing placed as well as between the the first 2-3 toes on right foot, pt tolerated dressing change well. Blood glucose stable throughout night, 2200 levemir held r/t unstable levels throughout the day.    Pt rated pain in right foot 7/10, PRN oxycodone given with little relief. MD Solitario updated and oxycodone range increased 5-10 mg every 4 hours PRN via telephone order.  Pt reports feeling anxious discharging to home as he feels there are many factors at home that will interfere with healing process. Pt states \" too many people live in at our house. It's just a small 3 bedroom trailer and me, my mom and sister and her kids are all living there\".social consult in and will update  in the morning, will continue to monitor.    /64   Pulse 63   Temp 98.8  F (37.1  C) (Tympanic)   Resp 16   Ht 1.702 m (5' 7\")   Wt 84.5 kg (186 lb 4.8 oz)   SpO2 95%   BMI 29.18 kg/m          "

## 2021-08-24 ENCOUNTER — APPOINTMENT (OUTPATIENT)
Dept: ULTRASOUND IMAGING | Facility: OTHER | Age: 66
End: 2021-08-24
Attending: FAMILY MEDICINE
Payer: COMMERCIAL

## 2021-08-24 PROBLEM — L97.519 DIABETIC ULCER OF RIGHT FOOT (H): Status: ACTIVE | Noted: 2021-08-19

## 2021-08-24 PROCEDURE — 250N000013 HC RX MED GY IP 250 OP 250 PS 637: Performed by: SURGERY

## 2021-08-24 PROCEDURE — 250N000011 HC RX IP 250 OP 636: Performed by: INTERNAL MEDICINE

## 2021-08-24 PROCEDURE — 93922 UPR/L XTREMITY ART 2 LEVELS: CPT

## 2021-08-24 PROCEDURE — 250N000013 HC RX MED GY IP 250 OP 250 PS 637: Performed by: INTERNAL MEDICINE

## 2021-08-24 PROCEDURE — 99232 SBSQ HOSP IP/OBS MODERATE 35: CPT | Performed by: FAMILY MEDICINE

## 2021-08-24 PROCEDURE — 99232 SBSQ HOSP IP/OBS MODERATE 35: CPT | Mod: 25 | Performed by: SURGERY

## 2021-08-24 PROCEDURE — 250N000011 HC RX IP 250 OP 636: Performed by: SURGERY

## 2021-08-24 PROCEDURE — 120N000001 HC R&B MED SURG/OB

## 2021-08-24 PROCEDURE — 258N000003 HC RX IP 258 OP 636: Performed by: FAMILY MEDICINE

## 2021-08-24 PROCEDURE — 250N000011 HC RX IP 250 OP 636: Performed by: FAMILY MEDICINE

## 2021-08-24 PROCEDURE — 250N000013 HC RX MED GY IP 250 OP 250 PS 637: Performed by: FAMILY MEDICINE

## 2021-08-24 RX ORDER — FENTANYL CITRATE 50 UG/ML
50 INJECTION, SOLUTION INTRAMUSCULAR; INTRAVENOUS ONCE
Status: COMPLETED | OUTPATIENT
Start: 2021-08-24 | End: 2021-08-24

## 2021-08-24 RX ORDER — SODIUM CHLORIDE 9 MG/ML
INJECTION, SOLUTION INTRAVENOUS
Status: DISCONTINUED | OUTPATIENT
Start: 2021-08-24 | End: 2021-08-30

## 2021-08-24 RX ADMIN — ESCITALOPRAM OXALATE 20 MG: 10 TABLET ORAL at 09:37

## 2021-08-24 RX ADMIN — CLOPIDOGREL BISULFATE 75 MG: 75 TABLET, FILM COATED ORAL at 09:39

## 2021-08-24 RX ADMIN — ONDANSETRON 4 MG: 4 TABLET, ORALLY DISINTEGRATING ORAL at 15:37

## 2021-08-24 RX ADMIN — METOPROLOL SUCCINATE 25 MG: 25 TABLET, EXTENDED RELEASE ORAL at 22:53

## 2021-08-24 RX ADMIN — ATORVASTATIN CALCIUM 40 MG: 40 TABLET, FILM COATED ORAL at 22:53

## 2021-08-24 RX ADMIN — VANCOMYCIN HYDROCHLORIDE 1750 MG: 500 INJECTION, POWDER, LYOPHILIZED, FOR SOLUTION INTRAVENOUS at 16:40

## 2021-08-24 RX ADMIN — HYOSCYAMINE SULFATE: 16 SOLUTION at 09:40

## 2021-08-24 RX ADMIN — CIPROFLOXACIN HYDROCHLORIDE 500 MG: 500 TABLET, FILM COATED ORAL at 09:39

## 2021-08-24 RX ADMIN — TAMSULOSIN HYDROCHLORIDE 0.4 MG: 0.4 CAPSULE ORAL at 09:38

## 2021-08-24 RX ADMIN — CLINDAMYCIN HYDROCHLORIDE 300 MG: 150 CAPSULE ORAL at 13:06

## 2021-08-24 RX ADMIN — ACETAMINOPHEN 1000 MG: 500 TABLET, FILM COATED ORAL at 22:52

## 2021-08-24 RX ADMIN — ACETAMINOPHEN 1000 MG: 500 TABLET, FILM COATED ORAL at 15:37

## 2021-08-24 RX ADMIN — LISINOPRIL 10 MG: 10 TABLET ORAL at 09:37

## 2021-08-24 RX ADMIN — FAMOTIDINE 20 MG: 20 TABLET ORAL at 09:39

## 2021-08-24 RX ADMIN — TAZOBACTAM SODIUM AND PIPERACILLIN SODIUM 3.38 G: 375; 3 INJECTION, SOLUTION INTRAVENOUS at 15:42

## 2021-08-24 RX ADMIN — OXYCODONE HYDROCHLORIDE 10 MG: 5 TABLET ORAL at 16:51

## 2021-08-24 RX ADMIN — ENOXAPARIN SODIUM 40 MG: 100 INJECTION SUBCUTANEOUS at 22:53

## 2021-08-24 RX ADMIN — TAZOBACTAM SODIUM AND PIPERACILLIN SODIUM 3.38 G: 375; 3 INJECTION, SOLUTION INTRAVENOUS at 22:53

## 2021-08-24 RX ADMIN — OXYCODONE HYDROCHLORIDE 10 MG: 5 TABLET ORAL at 13:06

## 2021-08-24 RX ADMIN — ACETAMINOPHEN 1000 MG: 500 TABLET, FILM COATED ORAL at 08:33

## 2021-08-24 RX ADMIN — CLINDAMYCIN HYDROCHLORIDE 300 MG: 150 CAPSULE ORAL at 05:47

## 2021-08-24 RX ADMIN — SODIUM CHLORIDE: 9 INJECTION, SOLUTION INTRAVENOUS at 16:40

## 2021-08-24 RX ADMIN — OXYCODONE HYDROCHLORIDE 10 MG: 5 TABLET ORAL at 22:52

## 2021-08-24 RX ADMIN — FENTANYL CITRATE 50 MCG: 50 INJECTION, SOLUTION INTRAMUSCULAR; INTRAVENOUS at 15:38

## 2021-08-24 RX ADMIN — FAMOTIDINE 20 MG: 20 TABLET ORAL at 22:53

## 2021-08-24 RX ADMIN — OXYCODONE HYDROCHLORIDE 10 MG: 5 TABLET ORAL at 08:52

## 2021-08-24 RX ADMIN — CLINDAMYCIN HYDROCHLORIDE 300 MG: 150 CAPSULE ORAL at 00:32

## 2021-08-24 ASSESSMENT — ACTIVITIES OF DAILY LIVING (ADL)
ADLS_ACUITY_SCORE: 15
ADLS_ACUITY_SCORE: 15
ADLS_ACUITY_SCORE: 17
ADLS_ACUITY_SCORE: 15
ADLS_ACUITY_SCORE: 17
ADLS_ACUITY_SCORE: 17

## 2021-08-24 ASSESSMENT — MIFFLIN-ST. JEOR: SCORE: 1595.63

## 2021-08-24 NOTE — PROGRESS NOTES
GENERAL SURGERY PROGRESS NOTE  8/24/2021      Interval history:   No acute events overnight. Pain is moderately well controlled. Tolerating dressing changes. Denies fevers or chills.     Physical Exam:   Vital signs are reviewed and there are no significant abnormalities.     UOP over past 24 hours is x6  BMx1    General: laying in bed, appears comfortable   MSK: shallow ulcer on the top of the right foot seems to be extending up the 1 and 2nd toes. Toes are looking darker. Visible, desiccated great toe tendon. No obvious fluid collection. Pitting edema up to ankle  Neuro: alert and oriented     MRI right foot: Large dorsal ulcer/open wound. There is diffuse soft  tissue edema without evidence of abscess. There is no evidence of  osteomyelitis at this time.    RITA is nondiagnostic due to non-compressible arteries.     No new imaging       Assessment / Plan:   Adam Duarte is a 66 year old male with diabetic foot ulcer. Patient is afebrile and hemodynamically stable. Wound appears stable.     Twice daily dressing changes wet-to-dry using Dakin's solution  Restart Iv antibiotics   Keep foot elevated to mitigate swelling   Consult social work to set up home health to assist with wound cares  CTA      SHAHRZAD Subramanian MD   8/24/2021

## 2021-08-24 NOTE — PLAN OF CARE
Pt admitted 8/19/21 with R foot wound.     Pt had emesis this afternoon just after taking PO antibiotics and oxycodone. No further nausea or vomiting. Up ind. In room.     Dressing changed x2 today by Dr. Hernandez with dakins. This afternoon wound was debrided to remove dead tissue. Dusky surrounding golf ball size wound with eschar and appx. 10% epithelial tissue, wound wraps through in between toes. Pt has moderate to severe pain. Gave IV fentanyl 50 mcg and oral zofran just before debridement. Restrated on IV antibiotics.     Pt is quiet and reports no complaints unless asked.     Good appetite, intake and output. Able to alert staff of needs.     Plan is to discharge to [facility] in a few days.

## 2021-08-24 NOTE — PHARMACY-VANCOMYCIN DOSING SERVICE
"Pharmacy Vancomycin Note  Date of Service 2021  Patient's  1955   66 year old, male    Indication: Skin and Soft Tissue Infection  Day of Therapy: 1  Current vancomycin regimen:  1750 mg IV q24h x 2 doses  Current vancomycin monitoring method: AUC  Current vancomycin therapeutic monitoring goal: 400-600 mg*h/L    Current estimated CrCl = Estimated Creatinine Clearance: 92.4 mL/min (based on SCr of 0.84 mg/dL).    Creatinine for last 3 days  2021:  6:16 AM Creatinine 0.93 mg/dL  2021: 12:41 PM Creatinine 0.84 mg/dL    Recent Vancomycin Levels (past 3 days)  No results found for requested labs within last 72 hours.    Vancomycin IV Administrations (past 72 hours)      No vancomycin orders with administrations in past 72 hours.                Nephrotoxins and other renal medications (From now, onward)    Start     Dose/Rate Route Frequency Ordered Stop    21 1600  vancomycin (VANCOCIN) 1,750 mg in sodium chloride 0.9 % 500 mL intermittent infusion      1,750 mg  over 120 Minutes Intravenous DAILY 21 1529 21 1559    21 1530  piperacillin-tazobactam (ZOSYN) infusion 3.375 g     Note to Pharmacy: For [facility], [facility] and [facility]: For Zosyn-naive patients, use the \"Zosyn initial dose + extended infusion\" order panel.    3.375 g  100 mL/hr over 30 Minutes Intravenous 4 TIMES DAILY 21 1506      21 1300  lisinopril (ZESTRIL) tablet 10 mg      10 mg Oral DAILY 21 1235               Contrast Orders - past 72 hours (72h ago, onward)    Start     Dose/Rate Route Frequency Ordered Stop    21 1830  gadoterate meglumine (DOTAREM) injection 15 mL      15 mL Intravenous ONCE 21 1805      21 1830  gadoterate meglumine (DOTAREM) injection 15 mL      15 mL Intravenous ONCE 21 1809 21 1811          Interpretation of levels and current regimen:  Vancomycin level is reflective of -600    Has serum creatinine changed greater than 50% in last 72 " hours: No    Urine output:  good urine output    Renal Function: Stable    Regimen: 1750 mg IV every 24 hours.  Start time: 15:19 on 08/24/2021  Exposure target: AUC24 (range)400-600 mg/L.hr   AUC24,ss: 441 mg/L.hr  Probability of AUC24 > 400: 69 %  Ctrough,ss: 11.5 mg/L  Probability of Ctrough,ss > 20: 5 %  Probability of nephrotoxicity (Lodise PAUL 2009): 7 %    Plan:  1. Start vancomycin 1750 mg IV Q24H x 2 doses  2. Vancomycin monitoring method: AUC  3. Vancomycin therapeutic monitoring goal: 400-600 mg*h/L  4. Pharmacy will check vancomycin levels as appropriate in 1-3 Days.  5. Serum creatinine levels will be ordered daily for the first week of therapy and at least twice weekly for subsequent weeks.    Rosina Carcamo H

## 2021-08-24 NOTE — PROGRESS NOTES
:    I called Angie at [facility] and gave her discharge update. Anticipated  discharge in a few days.   will continue to follow.    JERILYN Donald on 8/24/2021 at 10:45 AM    :    PAS screen completed.  PAS confirmation # [id number].    JERILYN Donald on 8/24/2021 at 11:48 AM

## 2021-08-24 NOTE — PLAN OF CARE
Patient without many complaints.   Very stoic during dressing change by surgeon this am, did note that was quite painful.   Medicated with oxycodone after dressing change.   Patient expressed he is not looking forward to further wound evaluation by surgeon this afternoon.  Wound with eschar and wound bed dry despite being wet to dry dressing change.  Surgeon concerned that wound is not improving and may be spreading to 1st and 2nd toe.      Patient up independently and makes needs known.

## 2021-08-24 NOTE — PROGRESS NOTES
[facility]    Medicine Progress Note - Hospitalist Service  Date of Admission:  8/19/2021    Assessment & Plan          Adam Duarte is a 66 year old male admitted on 8/19/2021. He has a diabetic foot ulcer.    Principal Problem:  Diabetic ulcer of right foot (H) got zosyn and vanco in ER. Now on clinda/cipro.  Assessment: present on admission, transfer from [place]. ED provider obtained a xray there which shows no osteomyelitis.  MRI was done yesterday and showed significant soft tissue wound and edema but no obvious osteomyelitis.  Fevers are improving but per discussion with surgery wound is not healing as quickly as expected.  Plan for further debridement later today.    -RITA   -No blood cultures or wound cultures have been drawn that I can see from review of his ER report.   -continue cipro/clinda  -Dakin's solution with wet to dry dressing changes    Coronary atherosclerosis  Assessment: chronic, stable    DMII (diabetes mellitus, type 2) (H) with hypoglycemia. Likely due to different diet in hospital.   Assessment: hemoglobin A1c 11.1. Chronic poor control, but good control inpatient.  Patient tells me that he normally eats a lot of sweets at home and does not always check his blood sugar  -lower insulin regimen as needed, we cut back his sliding scale insulin and meal time insulin. Sugars improving without hypoglycemia.     Essential hypertension, slightly elevated here even on metoprolol.  He does use alcohol.  Question if some of his elevated blood pressures are due to alcohol use or withdrawals.  No significant signs of withdrawal here.  Assessment: chronic  -metoprolol  -add lisinopril and monitor renal function and lytes.     GERD (gastroesophageal reflux disease)  Assessment: chronic  Plan: pepcid    S/P CABG (coronary artery bypass graft)  Assessment: on chronic plavix and statin       Diet: Regular Diet Adult    DVT Prophylaxis: Enoxaparin (Lovenox) SQ  Hale Catheter:  Not present  Central Lines: None  Code Status: Full Code      Disposition Plan   Expected discharge: 1-2 days to SNF, depending on if he has osteo, antibiotic plan is established and safe disposition identified.      The patient's care was discussed with the Patient.    Rosalva Solitario DO  Hospitalist Service  [facility]  Securely message with the Vocera Web Console (learn more here)  Text page via [employer] Paging/Directory      Interval History   No new complaints today.  Leg is little bit painful.  Was evaluated by surgery team this morning with plan for further debridement later today.   He has not had any fever since admission but did have Tmax of 100.1 this AM.  Normally lives at home with some family.  No chest pain or shortness of breath.    Data reviewed today: I reviewed all medications, new labs and imaging results over the last 24 hours. I personally reviewed no images or EKG's today.    Physical Exam   Vital Signs: Temp: 100.1  F (37.8  C) Temp src: Tympanic BP: 128/64 Pulse: 62   Resp: 15 SpO2: 93 % O2 Device: None (Room air)    Weight: 186 lbs 11.67 oz  GENERAL: Comfortable, no apparent distress.  CARDIOVASCULAR: regular rate and rhythm, no murmur.  Mild lower extremity edema right greater than left.  RESPIRATORY: Clear to auscultation bilaterally, no wheezes or crackles.  GI: non-tender, non-distended, normal bowel sounds.   SKIN: erythema. No pus bilateral chronic vascular venous stasis changes with some mild erythema of the right lower leg.      Data   Recent Labs   Lab 08/23/21  1241 08/22/21  0616 08/20/21  0531   WBC 8.5  --  10.7   HGB 9.9*  --  10.5*   MCV 88  --  87    268 223     --  135   POTASSIUM 4.0  --  3.9   CHLORIDE 102  --  102   CO2 29  --  27   BUN 10  --  17   CR 0.84 0.93 0.89   ANIONGAP 3  --  6   AMAURY 8.8  --  8.3*   *  --  110*     Medications       atorvastatin  40 mg Oral QPM     ciprofloxacin  500 mg Oral BID     clindamycin  300 mg Oral  Q6H [facility]     clopidogrel  75 mg Oral Daily     enoxaparin ANTICOAGULANT  40 mg Subcutaneous Q24H     escitalopram  20 mg Oral Daily     famotidine  20 mg Oral BID     gadoterate meglumine  15 mL Intravenous Once     insulin aspart  1-7 Units Subcutaneous TID AC     insulin aspart  1-5 Units Subcutaneous At Bedtime     insulin detemir  25 Units Subcutaneous At Bedtime     insulin detemir  30 Units Subcutaneous QAM AC     lisinopril  10 mg Oral Daily     metoprolol succinate ER  25 mg Oral At Bedtime     sodium chloride (PF)  3 mL Intracatheter Q8H     sodium hypochlorite   Irrigation BID     tamsulosin  0.4 mg Oral Daily

## 2021-08-24 NOTE — PROGRESS NOTES
Pt is A&O x4. Pleasant and talking about family with writer. Up independently in room. Drsg to Rt foot changed/new. Scant amount of drainage. No apparent changes. Mild edema to RLE. Brisk cap refill. C/O pain 7/10. Oxycodone given x1 with some relief. VSS; LS dim. Resting comfortably at this time. Offers no further complaints. Will continue to monitor. Temp: 98.4  F (36.9  C) Temp src: Tympanic BP: 138/61 Pulse: 58   Resp: 16 SpO2: 98 % O2 Device: None (Room air)

## 2021-08-25 ENCOUNTER — APPOINTMENT (OUTPATIENT)
Dept: CT IMAGING | Facility: OTHER | Age: 66
End: 2021-08-25
Attending: SURGERY
Payer: COMMERCIAL

## 2021-08-25 LAB
ANION GAP SERPL CALCULATED.3IONS-SCNC: 6 MMOL/L (ref 3–14)
BASOPHILS # BLD AUTO: 0 10E3/UL (ref 0–0.2)
BASOPHILS NFR BLD AUTO: 0 %
BUN SERPL-MCNC: 15 MG/DL (ref 7–25)
CALCIUM SERPL-MCNC: 8.4 MG/DL (ref 8.6–10.3)
CHLORIDE BLD-SCNC: 103 MMOL/L (ref 98–107)
CO2 SERPL-SCNC: 28 MMOL/L (ref 21–31)
CREAT SERPL-MCNC: 1.07 MG/DL (ref 0.7–1.3)
CRP SERPL-MCNC: 59 MG/L
EOSINOPHIL # BLD AUTO: 0.2 10E3/UL (ref 0–0.7)
EOSINOPHIL NFR BLD AUTO: 2 %
ERYTHROCYTE [DISTWIDTH] IN BLOOD BY AUTOMATED COUNT: 13.2 % (ref 10–15)
GFR SERPL CREATININE-BSD FRML MDRD: 73 ML/MIN/1.73M2
GLUCOSE BLD-MCNC: 99 MG/DL (ref 70–105)
HCT VFR BLD AUTO: 27.1 % (ref 40–53)
HGB BLD-MCNC: 9.1 G/DL (ref 13.3–17.7)
IMM GRANULOCYTES # BLD: 0 10E3/UL
IMM GRANULOCYTES NFR BLD: 0 %
LYMPHOCYTES # BLD AUTO: 1.5 10E3/UL (ref 0.8–5.3)
LYMPHOCYTES NFR BLD AUTO: 20 %
MCH RBC QN AUTO: 29.7 PG (ref 26.5–33)
MCHC RBC AUTO-ENTMCNC: 33.6 G/DL (ref 31.5–36.5)
MCV RBC AUTO: 89 FL (ref 78–100)
MONOCYTES # BLD AUTO: 0.7 10E3/UL (ref 0–1.3)
MONOCYTES NFR BLD AUTO: 9 %
NEUTROPHILS # BLD AUTO: 5.2 10E3/UL (ref 1.6–8.3)
NEUTROPHILS NFR BLD AUTO: 69 %
NRBC # BLD AUTO: 0 10E3/UL
NRBC BLD AUTO-RTO: 0 /100
PLATELET # BLD AUTO: 280 10E3/UL (ref 150–450)
POTASSIUM BLD-SCNC: 3.7 MMOL/L (ref 3.5–5.1)
RBC # BLD AUTO: 3.06 10E6/UL (ref 4.4–5.9)
SODIUM SERPL-SCNC: 137 MMOL/L (ref 134–144)
WBC # BLD AUTO: 7.7 10E3/UL (ref 4–11)

## 2021-08-25 PROCEDURE — 250N000011 HC RX IP 250 OP 636: Performed by: FAMILY MEDICINE

## 2021-08-25 PROCEDURE — 99232 SBSQ HOSP IP/OBS MODERATE 35: CPT | Mod: 25 | Performed by: SURGERY

## 2021-08-25 PROCEDURE — 250N000013 HC RX MED GY IP 250 OP 250 PS 637: Performed by: INTERNAL MEDICINE

## 2021-08-25 PROCEDURE — 85025 COMPLETE CBC W/AUTO DIFF WBC: CPT | Performed by: SURGERY

## 2021-08-25 PROCEDURE — 250N000013 HC RX MED GY IP 250 OP 250 PS 637: Performed by: FAMILY MEDICINE

## 2021-08-25 PROCEDURE — 75635 CT ANGIO ABDOMINAL ARTERIES: CPT

## 2021-08-25 PROCEDURE — 76377 3D RENDER W/INTRP POSTPROCES: CPT

## 2021-08-25 PROCEDURE — 80048 BASIC METABOLIC PNL TOTAL CA: CPT | Performed by: FAMILY MEDICINE

## 2021-08-25 PROCEDURE — 120N000001 HC R&B MED SURG/OB

## 2021-08-25 PROCEDURE — 258N000003 HC RX IP 258 OP 636: Performed by: FAMILY MEDICINE

## 2021-08-25 PROCEDURE — 99232 SBSQ HOSP IP/OBS MODERATE 35: CPT | Performed by: FAMILY MEDICINE

## 2021-08-25 PROCEDURE — 86140 C-REACTIVE PROTEIN: CPT | Performed by: SURGERY

## 2021-08-25 PROCEDURE — 36415 COLL VENOUS BLD VENIPUNCTURE: CPT | Performed by: FAMILY MEDICINE

## 2021-08-25 PROCEDURE — 250N000011 HC RX IP 250 OP 636: Performed by: SURGERY

## 2021-08-25 PROCEDURE — 250N000011 HC RX IP 250 OP 636: Performed by: INTERNAL MEDICINE

## 2021-08-25 RX ORDER — IOPAMIDOL 755 MG/ML
100 INJECTION, SOLUTION INTRAVASCULAR ONCE
Status: COMPLETED | OUTPATIENT
Start: 2021-08-25 | End: 2021-08-25

## 2021-08-25 RX ADMIN — FAMOTIDINE 20 MG: 20 TABLET ORAL at 21:57

## 2021-08-25 RX ADMIN — TAZOBACTAM SODIUM AND PIPERACILLIN SODIUM 3.38 G: 375; 3 INJECTION, SOLUTION INTRAVENOUS at 17:09

## 2021-08-25 RX ADMIN — OXYCODONE HYDROCHLORIDE 10 MG: 5 TABLET ORAL at 23:09

## 2021-08-25 RX ADMIN — TAZOBACTAM SODIUM AND PIPERACILLIN SODIUM 3.38 G: 375; 3 INJECTION, SOLUTION INTRAVENOUS at 04:33

## 2021-08-25 RX ADMIN — VANCOMYCIN HYDROCHLORIDE 1750 MG: 500 INJECTION, POWDER, LYOPHILIZED, FOR SOLUTION INTRAVENOUS at 15:35

## 2021-08-25 RX ADMIN — IOPAMIDOL 100 ML: 755 INJECTION, SOLUTION INTRAVENOUS at 09:19

## 2021-08-25 RX ADMIN — METOPROLOL SUCCINATE 25 MG: 25 TABLET, EXTENDED RELEASE ORAL at 21:57

## 2021-08-25 RX ADMIN — OXYCODONE HYDROCHLORIDE 10 MG: 5 TABLET ORAL at 07:47

## 2021-08-25 RX ADMIN — ESCITALOPRAM OXALATE 20 MG: 10 TABLET ORAL at 07:39

## 2021-08-25 RX ADMIN — ENOXAPARIN SODIUM 40 MG: 100 INJECTION SUBCUTANEOUS at 22:03

## 2021-08-25 RX ADMIN — LISINOPRIL 10 MG: 10 TABLET ORAL at 07:39

## 2021-08-25 RX ADMIN — FAMOTIDINE 20 MG: 20 TABLET ORAL at 07:39

## 2021-08-25 RX ADMIN — TAZOBACTAM SODIUM AND PIPERACILLIN SODIUM 3.38 G: 375; 3 INJECTION, SOLUTION INTRAVENOUS at 09:50

## 2021-08-25 RX ADMIN — OXYCODONE HYDROCHLORIDE 10 MG: 5 TABLET ORAL at 19:32

## 2021-08-25 RX ADMIN — ATORVASTATIN CALCIUM 40 MG: 40 TABLET, FILM COATED ORAL at 21:56

## 2021-08-25 RX ADMIN — CLOPIDOGREL BISULFATE 75 MG: 75 TABLET, FILM COATED ORAL at 07:47

## 2021-08-25 RX ADMIN — TAMSULOSIN HYDROCHLORIDE 0.4 MG: 0.4 CAPSULE ORAL at 07:39

## 2021-08-25 RX ADMIN — ACETAMINOPHEN 1000 MG: 500 TABLET, FILM COATED ORAL at 07:37

## 2021-08-25 RX ADMIN — HYOSCYAMINE SULFATE: 16 SOLUTION at 08:57

## 2021-08-25 RX ADMIN — INSULIN DETEMIR 25 UNITS: 100 INJECTION, SOLUTION SUBCUTANEOUS at 22:03

## 2021-08-25 RX ADMIN — HYOSCYAMINE SULFATE: 16 SOLUTION at 22:04

## 2021-08-25 RX ADMIN — TAZOBACTAM SODIUM AND PIPERACILLIN SODIUM 3.38 G: 375; 3 INJECTION, SOLUTION INTRAVENOUS at 22:03

## 2021-08-25 RX ADMIN — OXYCODONE HYDROCHLORIDE 10 MG: 5 TABLET ORAL at 15:47

## 2021-08-25 ASSESSMENT — MIFFLIN-ST. JEOR: SCORE: 1591.87

## 2021-08-25 ASSESSMENT — ACTIVITIES OF DAILY LIVING (ADL)
ADLS_ACUITY_SCORE: 15

## 2021-08-25 NOTE — PLAN OF CARE
Pt is A&O x4. Up independently in room. C/O RLE pain 7/10. Tylenol and Oxy given x1 with some relief noted.  Brisk Cap refill to RLE. Drsg CDI. IV patent. ABX infused per order. Insulin held due to low BG. LS clear. Pt slept well throughout shift. Offers no further complaints at this time. Will continue to monitor. Temp: 98.2  F (36.8  C) Temp src: Tympanic BP: 122/56 Pulse: 59   Resp: 16 SpO2: 95 % O2 Device: None (Room air)

## 2021-08-25 NOTE — PROGRESS NOTES
GENERAL SURGERY PROGRESS NOTE  8/25/2021      Interval history:   No acute events overnight. Pain is moderately well controlled. Tolerating dressing changes. Denies fevers or chills.     Physical Exam:   Vital signs are reviewed and there are no significant abnormalities.     UOP over past 24 hours is x5  BMx1    General: laying in bed, appears comfortable   MSK: shallow ulcer on the top of the right foot seems to be extending up the 1 and 2nd toes. Some red tissue showing through the base of the wound today. Visible, desiccated great toe tendon. No obvious fluid collection. Pitting edema up to ankle  Neuro: alert and oriented     No new imaging       Assessment / Plan:   Adam Duarte is a 66 year old male with diabetic foot ulcer. Patient is afebrile and hemodynamically stable. Wound appearance is improved.     Twice daily dressing changes wet-to-dry using Dakin's solution  Iv antibiotics   Keep foot elevated to mitigate swelling   Consult social work to set up home health to assist with wound cares  CTA      SHAHRZAD Subramanian MD   8/25/2021

## 2021-08-25 NOTE — PHARMACY-VANCOMYCIN DOSING SERVICE
"Pharmacy Vancomycin Note  Date of Service 2021  Patient's  1955   66 year old, male    Indication: Skin and Soft Tissue Infection  Day of Therapy: 2  Current vancomycin regimen:  1750 mg IV q24h x 2 doses  Current vancomycin monitoring method: AUC  Current vancomycin therapeutic monitoring goal: 400-600 mg*h/L    Current estimated CrCl = Estimated Creatinine Clearance: 72.4 mL/min (based on SCr of 1.07 mg/dL).    Creatinine for last 3 days  2021: 12:41 PM Creatinine 0.84 mg/dL  2021:  5:30 AM Creatinine 1.07 mg/dL    Recent Vancomycin Levels (past 3 days)  No results found for requested labs within last 72 hours.    Vancomycin IV Administrations (past 72 hours)                   vancomycin (VANCOCIN) 1,750 mg in sodium chloride 0.9 % 500 mL intermittent infusion (mg) 1,750 mg New Bag 21 1640                Nephrotoxins and other renal medications (From now, onward)    Start     Dose/Rate Route Frequency Ordered Stop    21 1600  vancomycin (VANCOCIN) 1,750 mg in sodium chloride 0.9 % 500 mL intermittent infusion      1,750 mg  over 120 Minutes Intravenous DAILY 21 1529 21 1559    21 1530  piperacillin-tazobactam (ZOSYN) infusion 3.375 g     Note to Pharmacy: For [facility], [facility] and [facility]: For Zosyn-naive patients, use the \"Zosyn initial dose + extended infusion\" order panel.    3.375 g  100 mL/hr over 30 Minutes Intravenous 4 TIMES DAILY 21 1506      21 1300  lisinopril (ZESTRIL) tablet 10 mg      10 mg Oral DAILY 21 1235               Contrast Orders - past 72 hours (72h ago, onward)    Start     Dose/Rate Route Frequency Ordered Stop    21 0930  iopamidol (ISOVUE-370) solution 100 mL      100 mL Intravenous ONCE 21 0907 21 0919    21 1830  gadoterate meglumine (DOTAREM) injection 15 mL      15 mL Intravenous ONCE 21 1805      21 1830  gadoterate meglumine (DOTAREM) injection 15 mL      15 mL Intravenous ONCE " 08/23/21 1809 08/23/21 1811          Interpretation of levels and current regimen:  Vancomycin level is reflective of -600    Has serum creatinine changed greater than 50% in last 72 hours: No    Urine output:  good urine output    Renal Function: Stable    Regimen: 1750 mg IV every 24 hours.  Start time: 12:30 on 08/25/2021  Exposure target: AUC24 (range)400-600 mg/L.hr   AUC24,ss: 515 mg/L.hr  Probability of AUC24 > 400: 89 %  Ctrough,ss: 14.3 mg/L  Probability of Ctrough,ss > 20: 16 %  Probability of nephrotoxicity (Lodise PAUL 2009): 9 %    Plan:  1. Continue Current Dose (1750mg IV Q24H x 2 doses)  2. Vancomycin monitoring method: AUC  3. Vancomycin therapeutic monitoring goal: 400-600 mg*h/L  4. Pharmacy will check vancomycin levels as appropriate in 1-3 Days.  5. Serum creatinine levels will be ordered daily for the first week of therapy and at least twice weekly for subsequent weeks.    Maria Ines Cummings

## 2021-08-25 NOTE — PLAN OF CARE
Pt admitted 8/19/21 with R foot wound. Debrided x2 by surgeon since admit. Dressings changed BID with Dakins solution. See flowsheet for details. R great toe and second toe dusky with delayed cap refill. Pain is moderate, treated with PRN oxycodone and tylenol. Elevated frequently. Restarted on IV abx, zosyn and vanco yesterday. Showered and to CT this am, results pending. Appetite poor. Able to alert staff of needs. Pt agreeable to plan of care, discharge to [facility] pending progress.

## 2021-08-25 NOTE — PROGRESS NOTES
;    I called Angie from the [facility] and gave her discharge update.  Anticipated  discharge 1-2  days.    JERILYN Donald on 8/25/2021 at 10:13 AM

## 2021-08-25 NOTE — PROGRESS NOTES
[facility]    Medicine Progress Note - Hospitalist Service       Date of Admission:  8/19/2021    Assessment & Plan                Adam Duarte is a 66 year old male admitted on 8/19/2021. He has a diabetic foot ulcer.    Principal Problem:  Diabetic ulcer of right foot (H) got zosyn and vanco in ER. Now on clinda/cipro.  Assessment: present on admission, transfer from [place]. ED provider obtained a xray there which shows no osteomyelitis.  MRI was done 08/23/2021 and showed significant soft tissue wound and edema but no obvious osteomyelitis.  Fevers are improving and CRP has come down significantly since admit but does remain elevated.  -RITA was nondiagnostic  -Blood culture drawn yesterday with no growth to date.  -continue IV zosyn/vancomycin  -CTA ordered by surgery.  Wound care per surgery -appreciate their expertise.    Coronary atherosclerosis  Assessment: chronic, stable    DMII (diabetes mellitus, type 2) (H) with hypoglycemia. Likely due to different diet in hospital.   Assessment: hemoglobin A1c 11.1. Chronic poor control, but good control inpatient.  Patient reports poor diet at home.  -Continue with reduce insulin regimen due to risk for hypoglycemia with change in meal planning.    Essential hypertension, blood pressure mildly elevated.  Currently no evidence of alcohol withdrawal.  Assessment: chronic  -metoprolol  -Continue lisinopril and recheck BMP in a.m.    GERD (gastroesophageal reflux disease)  Assessment: chronic  Plan: pepcid    S/P CABG (coronary artery bypass graft)  Assessment: on chronic plavix and statin         Diet: Regular Diet Adult    DVT Prophylaxis: Enoxaparin (Lovenox) SQ  Hale Catheter: Not present  Central Lines: None  Code Status: Full Code      Disposition Plan   Expected discharge: Likely a couple more days yet depending on wound healing and whether or not he needs additional surgical intervention     The patient's care was discussed with the  Patient.    Be Faust MD  Hospitalist Service  [facility]  Securely message with the [employer] Web Console (learn more here)  Text page via [employer] Paging/Directory      Clinically Significant Risk Factors Present on Admission                ______________________________________________________________________    Interval History   Patient reports that he feels a bit better.  Foot is sore but less sharp pain.  He is eating and drinking well.  He did not have any fevers overnight.  T-max yesterday was 100.1.    Data reviewed today: I reviewed all medications, new labs and imaging results over the last 24 hours. I personally reviewed no images or EKG's today.    Physical Exam   Vital Signs: Temp: 99  F (37.2  C) Temp src: Tympanic BP: (!) 149/65 Pulse: 58   Resp: 20 SpO2: 94 % O2 Device: None (Room air)    Weight: 185 lbs 14.4 oz  Constitutional: awake, alert, cooperative, no apparent distress, and appears stated age  Respiratory: Clear to auscultation bilaterally  Cardiovascular: Regular rate and rhythm.  No murmurs rubs or gallops.  GI: Abdomen soft, nontender  Skin: Right dorsum of the foot debrided with visible tendon and soft tissues.  There is some healthy skin now coming up through the base of the wound.  No purulence.  Neuropsychiatric: General: normal, calm and normal eye contact  Orientation: oriented to self, place, time and situation  Memory and insight: memory for past and recent events intact and thought process normal    Data   Recent Labs   Lab 08/25/21  0530 08/23/21  1241 08/22/21  0616 08/20/21  0531   WBC 7.7 8.5  --  10.7   HGB 9.1* 9.9*  --  10.5*   MCV 89 88  --  87    285 268 223    134  --  135   POTASSIUM 3.7 4.0  --  3.9   CHLORIDE 103 102  --  102   CO2 28 29  --  27   BUN 15 10  --  17   CR 1.07 0.84 0.93 0.89   ANIONGAP 6 3  --  6   AMAURY 8.4* 8.8  --  8.3*   GLC 99 109*  --  110*     Recent Results (from the past 24 hour(s))   US RITA Doppler No  Exercise 1-2 Levels Bilateral    Narrative    PROCEDURE: US RITA DOPPLER NO EXERCISE, 1-2 LEVELS, BILAT 8/24/2021  2:10 PM    HISTORY: diabetic foot ulcers. prior CAD, vasculopath    COMPARISONS: None.    TECHNIQUE: Bilateral ankle-brachial indices and toe brachial indices  were obtained.    FINDINGS: The arteries at the ankle are noncompressible. The toe  pressure on the left measured 31 which is borderline low. A pulse in  the right great toe could not be obtained.         Impression    IMPRESSION: Nondiagnostic evaluation. CT or MR angiogram of the lower  extremities is recommended    DAYANARA VASQUEZ MD         SYSTEM ID:  [id number]

## 2021-08-26 LAB
CREAT SERPL-MCNC: 0.88 MG/DL (ref 0.7–1.3)
GFR SERPL CREATININE-BSD FRML MDRD: >90 ML/MIN/1.73M2
VANCOMYCIN SERPL-MCNC: 9.2 MG/L

## 2021-08-26 PROCEDURE — 82565 ASSAY OF CREATININE: CPT | Performed by: FAMILY MEDICINE

## 2021-08-26 PROCEDURE — 250N000011 HC RX IP 250 OP 636: Performed by: FAMILY MEDICINE

## 2021-08-26 PROCEDURE — 99232 SBSQ HOSP IP/OBS MODERATE 35: CPT | Mod: 25 | Performed by: SURGERY

## 2021-08-26 PROCEDURE — 258N000003 HC RX IP 258 OP 636: Performed by: FAMILY MEDICINE

## 2021-08-26 PROCEDURE — 36415 COLL VENOUS BLD VENIPUNCTURE: CPT | Performed by: FAMILY MEDICINE

## 2021-08-26 PROCEDURE — 80202 ASSAY OF VANCOMYCIN: CPT | Performed by: FAMILY MEDICINE

## 2021-08-26 PROCEDURE — 250N000013 HC RX MED GY IP 250 OP 250 PS 637: Performed by: INTERNAL MEDICINE

## 2021-08-26 PROCEDURE — 120N000001 HC R&B MED SURG/OB

## 2021-08-26 PROCEDURE — 99232 SBSQ HOSP IP/OBS MODERATE 35: CPT | Performed by: FAMILY MEDICINE

## 2021-08-26 PROCEDURE — 250N000013 HC RX MED GY IP 250 OP 250 PS 637: Performed by: FAMILY MEDICINE

## 2021-08-26 PROCEDURE — 250N000011 HC RX IP 250 OP 636: Performed by: INTERNAL MEDICINE

## 2021-08-26 PROCEDURE — 250N000011 HC RX IP 250 OP 636: Performed by: SURGERY

## 2021-08-26 PROCEDURE — 250N000012 HC RX MED GY IP 250 OP 636 PS 637: Performed by: INTERNAL MEDICINE

## 2021-08-26 RX ADMIN — ESCITALOPRAM OXALATE 20 MG: 10 TABLET ORAL at 09:42

## 2021-08-26 RX ADMIN — TAZOBACTAM SODIUM AND PIPERACILLIN SODIUM 3.38 G: 375; 3 INJECTION, SOLUTION INTRAVENOUS at 21:44

## 2021-08-26 RX ADMIN — TAZOBACTAM SODIUM AND PIPERACILLIN SODIUM 3.38 G: 375; 3 INJECTION, SOLUTION INTRAVENOUS at 16:13

## 2021-08-26 RX ADMIN — TAZOBACTAM SODIUM AND PIPERACILLIN SODIUM 3.38 G: 375; 3 INJECTION, SOLUTION INTRAVENOUS at 04:06

## 2021-08-26 RX ADMIN — OXYCODONE HYDROCHLORIDE 10 MG: 5 TABLET ORAL at 04:05

## 2021-08-26 RX ADMIN — CLOPIDOGREL BISULFATE 75 MG: 75 TABLET, FILM COATED ORAL at 09:41

## 2021-08-26 RX ADMIN — TAZOBACTAM SODIUM AND PIPERACILLIN SODIUM 3.38 G: 375; 3 INJECTION, SOLUTION INTRAVENOUS at 09:42

## 2021-08-26 RX ADMIN — LISINOPRIL 10 MG: 10 TABLET ORAL at 09:41

## 2021-08-26 RX ADMIN — METOPROLOL SUCCINATE 25 MG: 25 TABLET, EXTENDED RELEASE ORAL at 21:43

## 2021-08-26 RX ADMIN — HYOSCYAMINE SULFATE: 16 SOLUTION at 09:51

## 2021-08-26 RX ADMIN — HYOSCYAMINE SULFATE: 16 SOLUTION at 21:44

## 2021-08-26 RX ADMIN — FAMOTIDINE 20 MG: 20 TABLET ORAL at 21:43

## 2021-08-26 RX ADMIN — OXYCODONE HYDROCHLORIDE 5 MG: 5 TABLET ORAL at 19:55

## 2021-08-26 RX ADMIN — OXYCODONE HYDROCHLORIDE 5 MG: 5 TABLET ORAL at 14:31

## 2021-08-26 RX ADMIN — ACETAMINOPHEN 1000 MG: 500 TABLET, FILM COATED ORAL at 08:51

## 2021-08-26 RX ADMIN — VANCOMYCIN HYDROCHLORIDE 1000 MG: 1 INJECTION, POWDER, LYOPHILIZED, FOR SOLUTION INTRAVENOUS at 14:51

## 2021-08-26 RX ADMIN — FAMOTIDINE 20 MG: 20 TABLET ORAL at 09:42

## 2021-08-26 RX ADMIN — TAMSULOSIN HYDROCHLORIDE 0.4 MG: 0.4 CAPSULE ORAL at 09:41

## 2021-08-26 RX ADMIN — ENOXAPARIN SODIUM 40 MG: 100 INJECTION SUBCUTANEOUS at 21:43

## 2021-08-26 RX ADMIN — ATORVASTATIN CALCIUM 40 MG: 40 TABLET, FILM COATED ORAL at 21:43

## 2021-08-26 RX ADMIN — INSULIN DETEMIR 25 UNITS: 100 INJECTION, SOLUTION SUBCUTANEOUS at 21:50

## 2021-08-26 ASSESSMENT — MIFFLIN-ST. JEOR: SCORE: 1594.13

## 2021-08-26 ASSESSMENT — ACTIVITIES OF DAILY LIVING (ADL)
ADLS_ACUITY_SCORE: 15

## 2021-08-26 NOTE — PROGRESS NOTES
[facility]    Medicine Progress Note - Hospitalist Service       Date of Admission:  8/19/2021    Assessment & Plan            Adam Duarte is a 66 year old male admitted on 8/19/2021. He has a diabetic foot ulcer.    Principal Problem:  Diabetic ulcer of right foot (H) got zosyn and vanco in ER. Now on clinda/cipro.  Assessment: present on admission, transfer from [place]. ED provider obtained a xray there which shows no osteomyelitis.  MRI was done 08/23/2021 and showed significant soft tissue wound and edema but no obvious osteomyelitis.  Fever has resolved.  Pain improved.  CRP improving.  -RITA was nondiagnostic  -Blood culture drawn tenuous with no growth to date.  -continue IV zosyn/vancomycin  -CTA reviewed with vascular surgery who recommends ongoing conservative care including infection control, glucose control and offloading of the foot.  They suggest outpatient follow-up with them for angiogram if not healing with proper wound care.    Coronary atherosclerosis  Assessment: chronic, stable    DMII (diabetes mellitus, type 2) (H) with hypoglycemia. Likely due to different diet in hospital.   Assessment: hemoglobin A1c 11.1. Chronic poor control, but good control inpatient.  Patient reports poor diet at home.  -Continue with reduce insulin regimen due to risk for hypoglycemia with change in meal planning.    Essential hypertension, blood pressure mildly elevated.  Currently no evidence of alcohol withdrawal.  Assessment: chronic  -metoprolol  -Continue lisinopril and recheck BMP in a.m.    GERD (gastroesophageal reflux disease)  Assessment: chronic  Plan: pepcid    S/P CABG (coronary artery bypass graft)  Assessment: on chronic plavix and statin           Diet: Regular Diet Adult    DVT Prophylaxis: Enoxaparin (Lovenox) SQ  Hale Catheter: Not present  Central Lines: None  Code Status: Full Code      Disposition Plan   Expected discharge: Likely discharge in 1 to 2 days     The  patient's care was discussed with the Patient.    Be Faust MD  Hospitalist Service  [facility]  Securely message with the [employer]wo Web Console (learn more here)  Text page via [employer] Paging/Directory      Clinically Significant Risk Factors Present on Admission                ______________________________________________________________________    Interval History   Reports that his foot is still sore but improving.  He has not had any fevers or chills.  Tells me that his appetite seems to be normal but admits that he is eating much more healthy in the hospital than he does at home.    Data reviewed today: I reviewed all medications, new labs and imaging results over the last 24 hours. I personally reviewed CTA of leg.    Physical Exam   Vital Signs: Temp: 99  F (37.2  C) Temp src: Tympanic BP: (!) 164/74 Pulse: 65   Resp: 16 SpO2: 96 % O2 Device: None (Room air)    Weight: 186 lbs 6.4 oz  Constitutional: awake, alert, cooperative, no apparent distress, and appears stated age  Respiratory: No increased work of breathing, good air exchange, clear to auscultation bilaterally, no crackles or wheezing  Cardiovascular: Regular rate and rhythm.  No murmurs rubs or gallops heard.   GI: Abdomen Soft, non-tender.  No masses, rebound or guarding.   Musculoskeletal: No synovitis.  Muscle strength age and body habitus appropriateas well as equal and even.   Skin: No evidence for superficial infection.  Some granulation tissue forming at the base of the wound but overall very slow to heal.  Neuropsychiatric: General: normal, calm and normal eye contact  Orientation: oriented to self, place, time and situation  Memory and insight: memory for past and recent events intact and thought process normal    Data   Recent Labs   Lab 08/26/21  1308 08/25/21  0530 08/23/21  1241 08/22/21  0616 08/20/21  0531   WBC  --  7.7 8.5  --  10.7   HGB  --  9.1* 9.9*  --  10.5*   MCV  --  89 88  --  87   PLT  --  280 285  268 223   NA  --  137 134  --  135   POTASSIUM  --  3.7 4.0  --  3.9   CHLORIDE  --  103 102  --  102   CO2  --  28 29  --  27   BUN  --  15 10  --  17   CR 0.88 1.07 0.84 0.93 0.89   ANIONGAP  --  6 3  --  6   AMAURY  --  8.4* 8.8  --  8.3*   GLC  --  99 109*  --  110*     No results found for this or any previous visit (from the past 24 hour(s)).

## 2021-08-26 NOTE — PLAN OF CARE
Patient's dressing was changed at beginning of shift. Pain medication given throughout this shift.    Patient is pleasant and cooperative.    B/P: 152/61, T: 97.2, P: 61, R: 16

## 2021-08-26 NOTE — PROGRESS NOTES
:     Coordination with Jie from the [facility].  Provided with a discharge update.     JERILYN Koo on 8/26/2021 at 1:12 PM

## 2021-08-26 NOTE — PROGRESS NOTES
SAFETY CHECKLIST  ID Bands and Risk clasps correct and in place (DNR, Fall risk, Allergy, Latex, Limb):  Yes  All Lines Reconciled and labeled correctly: Yes  Whiteboard updated:Yes  Environmental interventions (bed/chair alarm on, call light, side rails, restraints, sitter....): Yes    Karen Zelaya RN on 8/26/2021 at 8:38 AM

## 2021-08-26 NOTE — PROGRESS NOTES
GENERAL SURGERY PROGRESS NOTE  8/26/2021      Interval history:   No acute events overnight. Pain is moderately well controlled. Tolerating dressing changes. Denies fevers or chills.     Physical Exam:   Vital signs are reviewed and there are no significant abnormalities.     UOP over past 24 hours is x3    General: laying in bed, appears comfortable   MSK: shallow ulcer on the top of the right foot seems to be extending up the 1 and 2nd toes. Some red tissue showing through the base of the wound today. Visible, desiccated great toe tendon. No obvious/drainable fluid collection. Pitting edema up to ankle  Neuro: alert and oriented     CTA: No flow-limiting stenosis of the aorta through the popliteal arteries.   3 vessel runoff to the right foot complicated by high-grade narrowing  of the anterior tibial and peroneal arteries.   Two-vessel runoff to the left foot, with occlusion of the left  anterior tibial artery and multifocal peroneal artery high-grade  stenoses.      Assessment / Plan:   Adam Duarte is a 66 year old male with diabetic foot ulcer. Patient is afebrile and hemodynamically stable.  The wound shows little to no signs of healing and some signs of progression.  Inflammatory markers and markers of infection continue to improve daily.  Less suspicious for infectious component at this time versus ischemic component.  CTA does show some limited blood flow to the feet bilaterally.  Unsure if the CTA findings are correctable.  I feel like the patient is likely headed towards amputation as I do not feel he is getting sufficient perfusion to his foot to heal this wound.       - Twice daily dressing changes wet-to-dry using Dakin's solution  - Iv antibiotics   - Keep foot elevated to mitigate swelling   - Consider transcutaneous oxygen pressures to evaluate for potential wound healing.   - Also may be worthwhile having a vascular surgeon / interventionalist review CTA to see if high grade narrowing in right  leg is correctable, or if amputation is the only option here.       SHAHRZAD Subramanian MD   8/26/2021

## 2021-08-26 NOTE — PLAN OF CARE
"Patient alert and oriented, independent in room. VSS, BP elevated this am, decreased this afternoon. Patient reported pain in right foot from ulcer, but requested tylenol as he states \"the oxycodone was too much.\" Later in the afternoon, patient rated pain at 7/10 and requested oxycodone 5mg, effective. Wound dressing changed by surgeon this am, dressing remains CDI. Continuing with IV antibiotics. Voids spontaneously without difficulty.     /60 (BP Location: Right arm)   Pulse 61   Temp 97.9  F (36.6  C) (Tympanic)   Resp 16   Ht 1.702 m (5' 7\")   Wt 84.6 kg (186 lb 6.4 oz)   SpO2 94%   BMI 29.19 kg/m      Karen Zelaya RN on 8/26/2021 at 6:21 PM      "

## 2021-08-26 NOTE — PHARMACY-VANCOMYCIN DOSING SERVICE
"Pharmacy Vancomycin Note  Date of Service 2021  Patient's  1955   66 year old, male    Indication: Skin and Soft Tissue Infection  Day of Therapy: 3  Current vancomycin regimen:  1750 mg IV q24h  Current vancomycin monitoring method: AUC  Current vancomycin therapeutic monitoring goal: 400-600 mg*h/L    Current estimated CrCl = Estimated Creatinine Clearance: 88.2 mL/min (based on SCr of 0.88 mg/dL).    Creatinine for last 3 days  2021:  5:30 AM Creatinine 1.07 mg/dL  2021:  1:08 PM Creatinine 0.88 mg/dL    Recent Vancomycin Levels (past 3 days)  2021:  1:08 PM Vancomycin 9.2 mg/L    Vancomycin IV Administrations (past 72 hours)                   vancomycin (VANCOCIN) 1,750 mg in sodium chloride 0.9 % 500 mL intermittent infusion (mg) 1,750 mg New Bag 21 1535     1,750 mg New Bag 21 1640                Nephrotoxins and other renal medications (From now, onward)    Start     Dose/Rate Route Frequency Ordered Stop    21 1430  vancomycin (VANCOCIN) 1,000 mg in sodium chloride 0.9 % 250 mL intermittent infusion      1,000 mg  over 60 Minutes Intravenous 2 TIMES DAILY 21 1414      21 1530  piperacillin-tazobactam (ZOSYN) infusion 3.375 g     Note to Pharmacy: For [facility], [facility] and [facility]: For Zosyn-naive patients, use the \"Zosyn initial dose + extended infusion\" order panel.    3.375 g  100 mL/hr over 30 Minutes Intravenous 4 TIMES DAILY 21 1506      21 1300  lisinopril (ZESTRIL) tablet 10 mg      10 mg Oral DAILY 21 1235               Contrast Orders - past 72 hours (72h ago, onward)    Start     Dose/Rate Route Frequency Ordered Stop    21 0930  iopamidol (ISOVUE-370) solution 100 mL      100 mL Intravenous ONCE 21 0907 21 0919    21 1830  gadoterate meglumine (DOTAREM) injection 15 mL      15 mL Intravenous ONCE 21 1805      21 1830  gadoterate meglumine (DOTAREM) injection 15 mL      15 mL Intravenous ONCE " 08/23/21 1809 08/23/21 1811          Interpretation of levels and current regimen:  Vancomycin level is reflective of -600    Has serum creatinine changed greater than 50% in last 72 hours: No    Urine output:  good urine output    Renal Function: Stable    MD discussed with vascular surgery who recommended continuing IV antibiotics as ordered, continued past 48 hours.  Regimen: 1000 mg IV every 12 hours.  Start time: 15:35 on 08/26/2021  Exposure target: AUC24 (range)400-600 mg/L.hr   AUC24,ss: 453 mg/L.hr  Probability of AUC24 > 400: 75 %  Ctrough,ss: 13.5 mg/L  Probability of Ctrough,ss > 20: 5 %  Probability of nephrotoxicity (Lodise PAUL 2009): 9 %    Plan:  1. Increase Dose to 1000 mg IV Q12H  2. Vancomycin monitoring method: AUC  3. Vancomycin therapeutic monitoring goal: 400-600 mg*h/L  4. Pharmacy will check vancomycin levels as appropriate in 1-3 Days.  5. Serum creatinine levels will be ordered daily for the first week of therapy and at least twice weekly for subsequent weeks.    Rosina Carcamo, [facility]

## 2021-08-27 LAB
ANION GAP SERPL CALCULATED.3IONS-SCNC: 8 MMOL/L (ref 3–14)
BUN SERPL-MCNC: 11 MG/DL (ref 7–25)
CALCIUM SERPL-MCNC: 8.7 MG/DL (ref 8.6–10.3)
CHLORIDE BLD-SCNC: 103 MMOL/L (ref 98–107)
CO2 SERPL-SCNC: 27 MMOL/L (ref 21–31)
CREAT SERPL-MCNC: 0.81 MG/DL (ref 0.7–1.3)
CRP SERPL-MCNC: 52 MG/L
ERYTHROCYTE [DISTWIDTH] IN BLOOD BY AUTOMATED COUNT: 13.1 % (ref 10–15)
GFR SERPL CREATININE-BSD FRML MDRD: >90 ML/MIN/1.73M2
GLUCOSE BLD-MCNC: 94 MG/DL (ref 70–105)
HCT VFR BLD AUTO: 30 % (ref 40–53)
HGB BLD-MCNC: 10.1 G/DL (ref 13.3–17.7)
MCH RBC QN AUTO: 29.3 PG (ref 26.5–33)
MCHC RBC AUTO-ENTMCNC: 33.7 G/DL (ref 31.5–36.5)
MCV RBC AUTO: 87 FL (ref 78–100)
PLATELET # BLD AUTO: 328 10E3/UL (ref 150–450)
POTASSIUM BLD-SCNC: 3.6 MMOL/L (ref 3.5–5.1)
RBC # BLD AUTO: 3.45 10E6/UL (ref 4.4–5.9)
SARS-COV-2 RNA RESP QL NAA+PROBE: NEGATIVE
SODIUM SERPL-SCNC: 138 MMOL/L (ref 134–144)
WBC # BLD AUTO: 8.6 10E3/UL (ref 4–11)

## 2021-08-27 PROCEDURE — 250N000011 HC RX IP 250 OP 636: Performed by: FAMILY MEDICINE

## 2021-08-27 PROCEDURE — 250N000011 HC RX IP 250 OP 636: Performed by: SURGERY

## 2021-08-27 PROCEDURE — 250N000013 HC RX MED GY IP 250 OP 250 PS 637: Performed by: FAMILY MEDICINE

## 2021-08-27 PROCEDURE — 258N000003 HC RX IP 258 OP 636: Performed by: FAMILY MEDICINE

## 2021-08-27 PROCEDURE — 85027 COMPLETE CBC AUTOMATED: CPT | Performed by: FAMILY MEDICINE

## 2021-08-27 PROCEDURE — 250N000013 HC RX MED GY IP 250 OP 250 PS 637: Performed by: INTERNAL MEDICINE

## 2021-08-27 PROCEDURE — 99232 SBSQ HOSP IP/OBS MODERATE 35: CPT | Performed by: FAMILY MEDICINE

## 2021-08-27 PROCEDURE — 36415 COLL VENOUS BLD VENIPUNCTURE: CPT | Performed by: FAMILY MEDICINE

## 2021-08-27 PROCEDURE — U0005 INFEC AGEN DETEC AMPLI PROBE: HCPCS | Performed by: FAMILY MEDICINE

## 2021-08-27 PROCEDURE — 120N000001 HC R&B MED SURG/OB

## 2021-08-27 PROCEDURE — 99232 SBSQ HOSP IP/OBS MODERATE 35: CPT | Mod: 25 | Performed by: SURGERY

## 2021-08-27 PROCEDURE — 80048 BASIC METABOLIC PNL TOTAL CA: CPT | Performed by: FAMILY MEDICINE

## 2021-08-27 PROCEDURE — 86140 C-REACTIVE PROTEIN: CPT | Performed by: FAMILY MEDICINE

## 2021-08-27 RX ORDER — LISINOPRIL 10 MG/1
10 TABLET ORAL ONCE
Status: COMPLETED | OUTPATIENT
Start: 2021-08-27 | End: 2021-08-27

## 2021-08-27 RX ORDER — LISINOPRIL 20 MG/1
20 TABLET ORAL DAILY
Status: DISCONTINUED | OUTPATIENT
Start: 2021-08-28 | End: 2021-08-30 | Stop reason: HOSPADM

## 2021-08-27 RX ADMIN — OXYCODONE HYDROCHLORIDE 5 MG: 5 TABLET ORAL at 12:52

## 2021-08-27 RX ADMIN — FAMOTIDINE 20 MG: 20 TABLET ORAL at 21:22

## 2021-08-27 RX ADMIN — TAZOBACTAM SODIUM AND PIPERACILLIN SODIUM 3.38 G: 375; 3 INJECTION, SOLUTION INTRAVENOUS at 09:32

## 2021-08-27 RX ADMIN — FAMOTIDINE 20 MG: 20 TABLET ORAL at 09:31

## 2021-08-27 RX ADMIN — ATORVASTATIN CALCIUM 40 MG: 40 TABLET, FILM COATED ORAL at 21:22

## 2021-08-27 RX ADMIN — TAMSULOSIN HYDROCHLORIDE 0.4 MG: 0.4 CAPSULE ORAL at 09:31

## 2021-08-27 RX ADMIN — LISINOPRIL 10 MG: 10 TABLET ORAL at 08:17

## 2021-08-27 RX ADMIN — LISINOPRIL 10 MG: 10 TABLET ORAL at 12:34

## 2021-08-27 RX ADMIN — ESCITALOPRAM OXALATE 20 MG: 10 TABLET ORAL at 09:31

## 2021-08-27 RX ADMIN — ACETAMINOPHEN 1000 MG: 500 TABLET, FILM COATED ORAL at 01:38

## 2021-08-27 RX ADMIN — VANCOMYCIN HYDROCHLORIDE 1000 MG: 1 INJECTION, POWDER, LYOPHILIZED, FOR SOLUTION INTRAVENOUS at 14:29

## 2021-08-27 RX ADMIN — HYOSCYAMINE SULFATE: 16 SOLUTION at 21:22

## 2021-08-27 RX ADMIN — METOPROLOL SUCCINATE 25 MG: 25 TABLET, EXTENDED RELEASE ORAL at 21:22

## 2021-08-27 RX ADMIN — INSULIN DETEMIR 25 UNITS: 100 INJECTION, SOLUTION SUBCUTANEOUS at 21:21

## 2021-08-27 RX ADMIN — TAZOBACTAM SODIUM AND PIPERACILLIN SODIUM 3.38 G: 375; 3 INJECTION, SOLUTION INTRAVENOUS at 16:25

## 2021-08-27 RX ADMIN — HYOSCYAMINE SULFATE: 16 SOLUTION at 09:37

## 2021-08-27 RX ADMIN — ACETAMINOPHEN 1000 MG: 500 TABLET, FILM COATED ORAL at 16:25

## 2021-08-27 RX ADMIN — ACETAMINOPHEN 1000 MG: 500 TABLET, FILM COATED ORAL at 08:17

## 2021-08-27 RX ADMIN — CLOPIDOGREL BISULFATE 75 MG: 75 TABLET, FILM COATED ORAL at 09:32

## 2021-08-27 RX ADMIN — TAZOBACTAM SODIUM AND PIPERACILLIN SODIUM 3.38 G: 375; 3 INJECTION, SOLUTION INTRAVENOUS at 21:22

## 2021-08-27 RX ADMIN — VANCOMYCIN HYDROCHLORIDE 1000 MG: 1 INJECTION, POWDER, LYOPHILIZED, FOR SOLUTION INTRAVENOUS at 01:39

## 2021-08-27 RX ADMIN — TAZOBACTAM SODIUM AND PIPERACILLIN SODIUM 3.38 G: 375; 3 INJECTION, SOLUTION INTRAVENOUS at 03:00

## 2021-08-27 ASSESSMENT — ACTIVITIES OF DAILY LIVING (ADL)
ADLS_ACUITY_SCORE: 17

## 2021-08-27 ASSESSMENT — MIFFLIN-ST. JEOR: SCORE: 1585.52

## 2021-08-27 NOTE — PHARMACY-VANCOMYCIN DOSING SERVICE
"Pharmacy Vancomycin Note  Date of Service 2021  Patient's  1955   66 year old, male    Indication: Skin and Soft Tissue Infection  Day of Therapy: 4  Current vancomycin regimen:  1000 mg IV q12h  Current vancomycin monitoring method: AUC  Current vancomycin therapeutic monitoring goal: 400-600 mg*h/L    Current estimated CrCl = Estimated Creatinine Clearance: 95.3 mL/min (based on SCr of 0.81 mg/dL).    Creatinine for last 3 days  2021:  5:30 AM Creatinine 1.07 mg/dL  2021:  1:08 PM Creatinine 0.88 mg/dL  2021:  8:50 AM Creatinine 0.81 mg/dL    Recent Vancomycin Levels (past 3 days)  2021:  1:08 PM Vancomycin 9.2 mg/L    Vancomycin IV Administrations (past 72 hours)                   vancomycin (VANCOCIN) 1,000 mg in sodium chloride 0.9 % 250 mL intermittent infusion (mg) 1,000 mg New Bag 21 1429    vancomycin (VANCOCIN) 1,000 mg in sodium chloride 0.9 % 250 mL intermittent infusion (mg) 1,000 mg New Bag 21 0139     1,000 mg New Bag 21 1451    vancomycin (VANCOCIN) 1,750 mg in sodium chloride 0.9 % 500 mL intermittent infusion (mg) 1,750 mg New Bag 21 1535     1,750 mg New Bag 21 1640                Nephrotoxins and other renal medications (From now, onward)    Start     Dose/Rate Route Frequency Ordered Stop    21 1000  lisinopril (ZESTRIL) tablet 20 mg      20 mg Oral DAILY 21 1219      21 1400  vancomycin (VANCOCIN) 1,000 mg in sodium chloride 0.9 % 250 mL intermittent infusion      1,000 mg  over 60 Minutes Intravenous 2 TIMES DAILY 21 1225 21 1359    21 1530  piperacillin-tazobactam (ZOSYN) infusion 3.375 g     Note to Pharmacy: For [facility], [facility] and [facility]: For Zosyn-naive patients, use the \"Zosyn initial dose + extended infusion\" order panel.    3.375 g  100 mL/hr over 30 Minutes Intravenous 4 TIMES DAILY 21 1506               Contrast Orders - past 72 hours (72h ago, onward)    Start     Dose/Rate Route " Frequency Ordered Stop    08/25/21 0930  iopamidol (ISOVUE-370) solution 100 mL      100 mL Intravenous ONCE 08/25/21 0907 08/25/21 0919    08/23/21 1830  gadoterate meglumine (DOTAREM) injection 15 mL      15 mL Intravenous ONCE 08/23/21 1805            Interpretation of levels and current regimen:  Vancomycin level is reflective of -600    Has serum creatinine changed greater than 50% in last 72 hours: No    Urine output: Weights have been stable - checking with RN for any noted concerns.     Renal Function: Stable    Regimen: 1000 mg IV every 12 hours.  Start time: 13:39 on 08/27/2021  Exposure target: AUC24 (range)400-600 mg/L.hr   AUC24,ss: 427 mg/L.hr  Probability of AUC24 > 400: 64 %  Ctrough,ss: 12.5 mg/L  Probability of Ctrough,ss > 20: 3 %  Probability of nephrotoxicity (Lodise PAUL 2009): 8 %    Continuing past 48 hours per direction of MD. Per MD last dose scheduled for 0200 on 8/28/21.    Plan:  1. Continue current dose: 1000 mg IV q12H  2. Vancomycin monitoring method: AUC  3. Vancomycin therapeutic monitoring goal: 400-600 mg*h/L  4. Pharmacy will check vancomycin levels as appropriate in 1-3 Days.  5. Serum creatinine levels will be ordered daily for the first week of therapy and at least twice weekly for subsequent weeks.    Maria Ines Cummings

## 2021-08-27 NOTE — PROGRESS NOTES
"Patient is alert and orientated. Mild to moderate complaints of pain to right foot, gave prn oxycodone and tylenol. Exp wheezes in RUL, CHELITA and fine crackles to bases, IS use independently and encouraged with room visits, denies SOB. Heart regular. Poor RLE cap refill, numbness and tingling to BLE, weak left pedal pulse, and weak bilat posterior tibial pulses. Trace edema to LLE, mild edema to RLE, elevated as tolerated. Dressing changed with dakins per order, CDI. Voiding without difficulty. Independent in room, educated to call if he feels weak, dizzy, or lightheaded. VSS. Will continue to monitor.     BP (!) 164/69 (BP Location: Left arm)   Pulse 58   Temp 98.9  F (37.2  C) (Tympanic)   Resp 18   Ht 1.702 m (5' 7\")   Wt 84.6 kg (186 lb 6.4 oz)   SpO2 95%   BMI 29.19 kg/m      "

## 2021-08-27 NOTE — PROGRESS NOTES
:    I left message for Minal at [facility] and gave her discharge update.    JERILYN Donald on 8/27/2021 at 10:13 AM    :    [facility] called back and stated they cannot accept any new admissions over the weekend.  MD was updated.  Anticipated  discharge possibly Monday.    JERILYN Donald on 8/27/2021 at 12:46 PM

## 2021-08-27 NOTE — PROGRESS NOTES
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 Likely will go to SNF on discharge. Appetite noted to be good at most meals.    Diet Order:  Regular   Oral Intake:  % most meals    Intervention:  Recommend carb controlled diet, however was on carb controlled diet on admit and was changed by MD to regular likely related to episode of hypoglycemia.     Monitoring and Evaluation:   Intakes, diet tolerance, foot wound, weight.     Discharge Recommendation:   Nutrition Discharge Planning  recommend diabetic/carb controlled diet when discharged. Consider diabetic education consult.    Follow up weekly unless needed sooner. If so, please order consult.     Joie Pascual RD on 8/27/2021 at 2:25 PM      Joie Pascual RD on 8/27/2021 at 2:17 PM

## 2021-08-27 NOTE — PROGRESS NOTES
Patient BP at 0800 is 184/80, then recheck was 180/79, MD notified and orders to give lisinopril early.    BP recheck after med was 179/89, MD Faust notified, orders put in for additional 10mg of lisinopril today.    Recheck after additional lisinopril 154/77. Will continue to monitor.    Karen Zelaya RN on 8/27/2021 at 4:28 PM

## 2021-08-27 NOTE — PLAN OF CARE
PT/OT orders received. Discussed with MD about plan for BKA and evaluation deferred today. Will wait to evaluate until after surgery.

## 2021-08-27 NOTE — PROGRESS NOTES
GENERAL SURGERY PROGRESS NOTE  2021      Interval history:   Patient is doing well.  Patient is tolerating dressing changes.  No change to the overall appearance of the foot per staff.    Physical Exam:   Temp: 97.7  F (36.5  C) Temp  Min: 97.7  F (36.5  C)  Max: 98.9  F (37.2  C)  Resp: 16 Resp  Min: 16  Max: 18  SpO2: 95 % SpO2  Min: 94 %  Max: 97 %  Pulse: 60 Pulse  Min: 58  Max: 64    No data recorded  BP: (!) 176/89   Systolic (24hrs), Av , Min:138 , Max:180   Diastolic (24hrs), Av, Min:60, Max:89        General: laying in bed, appears comfortable   MSK: The right foot has been debrided to the level of the tendon on the first 3 toes.  The toes and remaining foot appear to be mummified.  Neuro: alert and oriented     CTA: No flow-limiting stenosis of the aorta through the popliteal arteries.   3 vessel runoff to the right foot complicated by high-grade narrowing  of the anterior tibial and peroneal arteries.   Two-vessel runoff to the left foot, with occlusion of the left  anterior tibial artery and multifocal peroneal artery high-grade  stenoses.      Assessment / Plan:   Adam Duarte is a 66 year old male with diabetic foot ulcer.  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  He does not here appear to have microvascular circulation to the distal foot.  It does not appear that a midfoot amputation will be able to heal either.  Would strongly suggest this patient get a below-knee amputation.      -Offered patient [facility] tomorrow 2021.  Will follow for surgical services through the weekend.    Yan Bear MD on 2021 at 1:35 PM

## 2021-08-27 NOTE — PROGRESS NOTES
[facility]    Medicine Progress Note - Hospitalist Service       Date of Admission:  8/19/2021    Assessment & Plan                  Adam Duarte is a 66 year old male admitted on 8/19/2021. He has a diabetic foot ulcer.    Principal Problem:  Diabetic ulcer of right foot (H) got zosyn and vanco in ER. Now on clinda/cipro.  Assessment: present on admission, transfer from [place]. ED provider obtained a xray there which shows no osteomyelitis.  MRI was done 08/23/2021 and showed significant soft tissue wound and edema but no obvious osteomyelitis.  Fever has resolved.  Pain improved.  CRP improving.  -RITA was nondiagnostic  -Blood culture with no growth.  No tissue culture done.  -continue IV zosyn/vancomycin  -CTA reviewed with vascular surgery who recommends ongoing conservative care including infection control, glucose control and offloading of the foot.  They suggest outpatient follow-up with them for angiogram if not healing with proper wound care.  -Likely discharge to SNF tomorrow.    Coronary atherosclerosis  Assessment: chronic, stable    DMII (diabetes mellitus, type 2) (H) with hypoglycemia. Likely due to different diet in hospital.   Assessment: hemoglobin A1c 11.1. Chronic poor control, but good control inpatient.  Patient reports poor diet at home.  -Continue with reduce insulin regimen due to risk for hypoglycemia with change in meal planning.    Essential hypertension, blood pressure continues to be elevated.  Assessment: chronic  -Continue home metoprolol, tamsulosin  -Increase lisinopril to 20 mg daily and recheck BMP in a.m.    GERD (gastroesophageal reflux disease)  Assessment: chronic  Plan: pepcid    S/P CABG (coronary artery bypass graft)  Assessment: on chronic plavix and statin             Diet: Regular Diet Adult    DVT Prophylaxis: Enoxaparin (Lovenox) SQ  Hale Catheter: Not present  Central Lines: None  Code Status: Full Code      Disposition Plan   Expected  "discharge: Patient will likely discharge to [facility] tomorrow pending bed availability.      The patient's care was discussed with the patient who reports he will update his mother.    Be Faust MD  Hospitalist Service  [facility]  Securely message with the Vocera Web Console (learn more here)  Text page via [facility] Paging/Directory      Clinically Significant Risk Factors Present on Admission                ______________________________________________________________________    Interval History   Patient reports his foot is \"sore\" but otherwise no new issues.  No fevers or chills overnight.  Denies any chest pain or shortness of breath.  Eating and drinking well.  Urinating well.  PT OT will see today and likely will need ongoing treatment at skilled nursing facility as well as will need dressing changes twice daily.    Data reviewed today: I reviewed all medications, new labs and imaging results over the last 24 hours. I personally reviewed no images or EKG's today.    Physical Exam   Vital Signs: Temp: 97.7  F (36.5  C) Temp src: Tympanic BP: (!) 176/89 Pulse: 60   Resp: 16 SpO2: 95 % O2 Device: None (Room air)    Weight: 184 lbs 8 oz  Constitutional: awake, alert, cooperative, no apparent distress, and appears stated age  Respiratory: No increased work of breathing, good air exchange, clear to auscultation bilaterally, no crackles or wheezing  Cardiovascular: Regular rate and rhythm.  No murmurs rubs or gallops heard.   GI: Abdomen Soft, non-tender.  No masses, rebound or guarding.   Musculoskeletal: generalized weakness  Neuropsychiatric: General: normal, calm and normal eye contact  Orientation: oriented to self, place, time and situation  Memory and insight: memory for past and recent events intact and thought process normal    Data   Recent Labs   Lab 08/27/21  0850 08/26/21  1308 08/25/21  0530 08/23/21  1241   WBC 8.6  --  7.7 8.5   HGB 10.1*  --  9.1* 9.9*   MCV 87  " --  89 88     --  280 285     --  137 134   POTASSIUM 3.6  --  3.7 4.0   CHLORIDE 103  --  103 102   CO2 27  --  28 29   BUN 11  --  15 10   CR 0.81 0.88 1.07 0.84   ANIONGAP 8  --  6 3   AMAURY 8.7  --  8.4* 8.8   GLC 94  --  99 109*     No results found for this or any previous visit (from the past 24 hour(s)).

## 2021-08-27 NOTE — PLAN OF CARE
"Patient alert and oriented, independent in room. VSS, BP elevated this am, additional lisinopril given per MD, effective. Patient reported pain at 5/10 in right foot from ulcer, alternating tylenol and oxycodone 5mg with relief. Wound assessed by MD Bear this am, he is recommending amputation, possibly for tomorrow. Dressing changed by nurse with dakins, wet to dry, wrapped in kerlix and sock over top. Continuing with IV antibiotics zosyn and vanco. Voiding appropriately.     BP (!) 154/77 (BP Location: Left arm)   Pulse 55   Temp 97.4  F (36.3  C) (Tympanic)   Resp 16   Ht 1.702 m (5' 7\")   Wt 83.7 kg (184 lb 8 oz)   SpO2 98%   BMI 28.90 kg/m      Karen Zelaya RN on 8/27/2021 at 6:33 PM    "

## 2021-08-27 NOTE — PROGRESS NOTES
Dressing change performed to right foot, new dressing consisted of 2 Dakins soaked dressing over tho top of the foot and 1 Dakins soaked dressing in between affected toes, all covered with dry gauze.  There was a mild odor, a scant amount of drainage, and internal anatomy was observed.

## 2021-08-28 ENCOUNTER — ANESTHESIA (OUTPATIENT)
Dept: SURGERY | Facility: OTHER | Age: 66
End: 2021-08-28
Payer: COMMERCIAL

## 2021-08-28 ENCOUNTER — ANESTHESIA EVENT (OUTPATIENT)
Dept: SURGERY | Facility: OTHER | Age: 66
End: 2021-08-28
Payer: COMMERCIAL

## 2021-08-28 LAB
ANION GAP SERPL CALCULATED.3IONS-SCNC: 8 MMOL/L (ref 3–14)
BACTERIA BLD CULT: NO GROWTH
BUN SERPL-MCNC: 10 MG/DL (ref 7–25)
CALCIUM SERPL-MCNC: 8.5 MG/DL (ref 8.6–10.3)
CHLORIDE BLD-SCNC: 104 MMOL/L (ref 98–107)
CO2 SERPL-SCNC: 25 MMOL/L (ref 21–31)
CREAT SERPL-MCNC: 0.8 MG/DL (ref 0.7–1.3)
CREAT SERPL-MCNC: 0.8 MG/DL (ref 0.7–1.3)
GFR SERPL CREATININE-BSD FRML MDRD: >90 ML/MIN/1.73M2
GFR SERPL CREATININE-BSD FRML MDRD: >90 ML/MIN/1.73M2
GLUCOSE BLD-MCNC: 52 MG/DL (ref 70–105)
PLATELET # BLD AUTO: 328 10E3/UL (ref 150–450)
POTASSIUM BLD-SCNC: 3.8 MMOL/L (ref 3.5–5.1)
SODIUM SERPL-SCNC: 137 MMOL/L (ref 134–144)

## 2021-08-28 PROCEDURE — 258N000003 HC RX IP 258 OP 636: Performed by: SURGERY

## 2021-08-28 PROCEDURE — 250N000013 HC RX MED GY IP 250 OP 250 PS 637: Performed by: FAMILY MEDICINE

## 2021-08-28 PROCEDURE — 85049 AUTOMATED PLATELET COUNT: CPT | Performed by: INTERNAL MEDICINE

## 2021-08-28 PROCEDURE — 64445 NJX AA&/STRD SCIATIC NRV IMG: CPT | Mod: RT | Performed by: NURSE ANESTHETIST, CERTIFIED REGISTERED

## 2021-08-28 PROCEDURE — 258N000001 HC RX 258: Performed by: FAMILY MEDICINE

## 2021-08-28 PROCEDURE — 258N000003 HC RX IP 258 OP 636: Performed by: NURSE ANESTHETIST, CERTIFIED REGISTERED

## 2021-08-28 PROCEDURE — 250N000013 HC RX MED GY IP 250 OP 250 PS 637: Performed by: INTERNAL MEDICINE

## 2021-08-28 PROCEDURE — 64447 NJX AA&/STRD FEMORAL NRV IMG: CPT | Mod: RT | Performed by: NURSE ANESTHETIST, CERTIFIED REGISTERED

## 2021-08-28 PROCEDURE — 370N000017 HC ANESTHESIA TECHNICAL FEE, PER MIN: Performed by: SURGERY

## 2021-08-28 PROCEDURE — 258N000003 HC RX IP 258 OP 636: Performed by: FAMILY MEDICINE

## 2021-08-28 PROCEDURE — 88331 PATH CONSLTJ SURG 1 BLK 1SPC: CPT

## 2021-08-28 PROCEDURE — 250N000011 HC RX IP 250 OP 636: Performed by: SURGERY

## 2021-08-28 PROCEDURE — 27880 AMPUTATION OF LOWER LEG: CPT | Mod: RT | Performed by: SURGERY

## 2021-08-28 PROCEDURE — 99232 SBSQ HOSP IP/OBS MODERATE 35: CPT | Performed by: FAMILY MEDICINE

## 2021-08-28 PROCEDURE — 27880 AMPUTATION OF LOWER LEG: CPT | Performed by: NURSE ANESTHETIST, CERTIFIED REGISTERED

## 2021-08-28 PROCEDURE — 250N000011 HC RX IP 250 OP 636: Performed by: NURSE ANESTHETIST, CERTIFIED REGISTERED

## 2021-08-28 PROCEDURE — 360N000076 HC SURGERY LEVEL 3, PER MIN: Performed by: SURGERY

## 2021-08-28 PROCEDURE — 82565 ASSAY OF CREATININE: CPT | Performed by: INTERNAL MEDICINE

## 2021-08-28 PROCEDURE — 82374 ASSAY BLOOD CARBON DIOXIDE: CPT | Performed by: FAMILY MEDICINE

## 2021-08-28 PROCEDURE — 36415 COLL VENOUS BLD VENIPUNCTURE: CPT | Performed by: INTERNAL MEDICINE

## 2021-08-28 PROCEDURE — 710N000010 HC RECOVERY PHASE 1, LEVEL 2, PER MIN: Performed by: SURGERY

## 2021-08-28 PROCEDURE — 250N000013 HC RX MED GY IP 250 OP 250 PS 637: Performed by: SURGERY

## 2021-08-28 PROCEDURE — 258N000001 HC RX 258: Performed by: INTERNAL MEDICINE

## 2021-08-28 PROCEDURE — 250N000009 HC RX 250: Performed by: NURSE ANESTHETIST, CERTIFIED REGISTERED

## 2021-08-28 PROCEDURE — 0Y6H0Z1 DETACHMENT AT RIGHT LOWER LEG, HIGH, OPEN APPROACH: ICD-10-PCS | Performed by: SURGERY

## 2021-08-28 PROCEDURE — 120N000001 HC R&B MED SURG/OB

## 2021-08-28 PROCEDURE — 250N000011 HC RX IP 250 OP 636: Performed by: FAMILY MEDICINE

## 2021-08-28 PROCEDURE — 76942 ECHO GUIDE FOR BIOPSY: CPT | Mod: 26 | Performed by: NURSE ANESTHETIST, CERTIFIED REGISTERED

## 2021-08-28 PROCEDURE — 88307 TISSUE EXAM BY PATHOLOGIST: CPT

## 2021-08-28 PROCEDURE — 250N000025 HC SEVOFLURANE, PER MIN: Performed by: SURGERY

## 2021-08-28 PROCEDURE — 272N000001 HC OR GENERAL SUPPLY STERILE: Performed by: SURGERY

## 2021-08-28 RX ORDER — SODIUM CHLORIDE, SODIUM LACTATE, POTASSIUM CHLORIDE, CALCIUM CHLORIDE 600; 310; 30; 20 MG/100ML; MG/100ML; MG/100ML; MG/100ML
INJECTION, SOLUTION INTRAVENOUS CONTINUOUS
Status: DISCONTINUED | OUTPATIENT
Start: 2021-08-28 | End: 2021-08-28 | Stop reason: HOSPADM

## 2021-08-28 RX ORDER — KETAMINE HYDROCHLORIDE 10 MG/ML
INJECTION INTRAMUSCULAR; INTRAVENOUS PRN
Status: DISCONTINUED | OUTPATIENT
Start: 2021-08-28 | End: 2021-08-28

## 2021-08-28 RX ORDER — ONDANSETRON 4 MG/1
4 TABLET, ORALLY DISINTEGRATING ORAL EVERY 6 HOURS PRN
Status: DISCONTINUED | OUTPATIENT
Start: 2021-08-28 | End: 2021-08-30 | Stop reason: HOSPADM

## 2021-08-28 RX ORDER — HYDRALAZINE HYDROCHLORIDE 20 MG/ML
10 INJECTION INTRAMUSCULAR; INTRAVENOUS EVERY 4 HOURS PRN
Status: DISCONTINUED | OUTPATIENT
Start: 2021-08-28 | End: 2021-08-30 | Stop reason: HOSPADM

## 2021-08-28 RX ORDER — LIDOCAINE HYDROCHLORIDE 20 MG/ML
INJECTION, SOLUTION INFILTRATION; PERINEURAL PRN
Status: DISCONTINUED | OUTPATIENT
Start: 2021-08-28 | End: 2021-08-28

## 2021-08-28 RX ORDER — PROPOFOL 10 MG/ML
INJECTION, EMULSION INTRAVENOUS PRN
Status: DISCONTINUED | OUTPATIENT
Start: 2021-08-28 | End: 2021-08-28

## 2021-08-28 RX ORDER — CALCIUM CARBONATE 500 MG/1
500 TABLET, CHEWABLE ORAL 4 TIMES DAILY PRN
Status: DISCONTINUED | OUTPATIENT
Start: 2021-08-28 | End: 2021-08-30 | Stop reason: HOSPADM

## 2021-08-28 RX ORDER — HYDROMORPHONE HYDROCHLORIDE 1 MG/ML
0.5 INJECTION, SOLUTION INTRAMUSCULAR; INTRAVENOUS; SUBCUTANEOUS
Status: DISCONTINUED | OUTPATIENT
Start: 2021-08-28 | End: 2021-08-30 | Stop reason: HOSPADM

## 2021-08-28 RX ORDER — OXYCODONE HYDROCHLORIDE 5 MG/1
5 TABLET ORAL EVERY 4 HOURS PRN
Status: DISCONTINUED | OUTPATIENT
Start: 2021-08-28 | End: 2021-08-30 | Stop reason: HOSPADM

## 2021-08-28 RX ORDER — DIPHENHYDRAMINE HCL 25 MG
25 CAPSULE ORAL EVERY 6 HOURS PRN
Status: DISCONTINUED | OUTPATIENT
Start: 2021-08-28 | End: 2021-08-28 | Stop reason: HOSPADM

## 2021-08-28 RX ORDER — BISACODYL 10 MG
10 SUPPOSITORY, RECTAL RECTAL DAILY PRN
Status: DISCONTINUED | OUTPATIENT
Start: 2021-08-28 | End: 2021-08-30 | Stop reason: HOSPADM

## 2021-08-28 RX ORDER — FENTANYL CITRATE 50 UG/ML
50 INJECTION, SOLUTION INTRAMUSCULAR; INTRAVENOUS EVERY 5 MIN PRN
Status: DISCONTINUED | OUTPATIENT
Start: 2021-08-28 | End: 2021-08-28 | Stop reason: HOSPADM

## 2021-08-28 RX ORDER — SODIUM CHLORIDE, SODIUM LACTATE, POTASSIUM CHLORIDE, CALCIUM CHLORIDE 600; 310; 30; 20 MG/100ML; MG/100ML; MG/100ML; MG/100ML
INJECTION, SOLUTION INTRAVENOUS CONTINUOUS
Status: DISCONTINUED | OUTPATIENT
Start: 2021-08-28 | End: 2021-08-29

## 2021-08-28 RX ORDER — LIDOCAINE 40 MG/G
CREAM TOPICAL
Status: DISCONTINUED | OUTPATIENT
Start: 2021-08-28 | End: 2021-08-30 | Stop reason: HOSPADM

## 2021-08-28 RX ORDER — ONDANSETRON 4 MG/1
4 TABLET, ORALLY DISINTEGRATING ORAL EVERY 30 MIN PRN
Status: DISCONTINUED | OUTPATIENT
Start: 2021-08-28 | End: 2021-08-28 | Stop reason: HOSPADM

## 2021-08-28 RX ORDER — ONDANSETRON 2 MG/ML
4 INJECTION INTRAMUSCULAR; INTRAVENOUS EVERY 6 HOURS PRN
Status: DISCONTINUED | OUTPATIENT
Start: 2021-08-28 | End: 2021-08-30 | Stop reason: HOSPADM

## 2021-08-28 RX ORDER — FAMOTIDINE 20 MG/1
20 TABLET, FILM COATED ORAL 2 TIMES DAILY
Status: DISCONTINUED | OUTPATIENT
Start: 2021-08-28 | End: 2021-08-30 | Stop reason: HOSPADM

## 2021-08-28 RX ORDER — BUPIVACAINE HYDROCHLORIDE 5 MG/ML
INJECTION, SOLUTION EPIDURAL; INTRACAUDAL PRN
Status: DISCONTINUED | OUTPATIENT
Start: 2021-08-28 | End: 2021-08-28

## 2021-08-28 RX ORDER — OXYCODONE HYDROCHLORIDE 5 MG/1
10 TABLET ORAL EVERY 4 HOURS PRN
Status: DISCONTINUED | OUTPATIENT
Start: 2021-08-28 | End: 2021-08-30 | Stop reason: HOSPADM

## 2021-08-28 RX ORDER — ONDANSETRON 2 MG/ML
INJECTION INTRAMUSCULAR; INTRAVENOUS PRN
Status: DISCONTINUED | OUTPATIENT
Start: 2021-08-28 | End: 2021-08-28

## 2021-08-28 RX ORDER — ACETAMINOPHEN 325 MG/1
650 TABLET ORAL EVERY 4 HOURS PRN
Status: DISCONTINUED | OUTPATIENT
Start: 2021-08-31 | End: 2021-08-30 | Stop reason: HOSPADM

## 2021-08-28 RX ORDER — POLYETHYLENE GLYCOL 3350 17 G/17G
17 POWDER, FOR SOLUTION ORAL DAILY
Status: DISCONTINUED | OUTPATIENT
Start: 2021-08-29 | End: 2021-08-30 | Stop reason: HOSPADM

## 2021-08-28 RX ORDER — PROCHLORPERAZINE MALEATE 10 MG
10 TABLET ORAL EVERY 6 HOURS PRN
Status: DISCONTINUED | OUTPATIENT
Start: 2021-08-28 | End: 2021-08-30 | Stop reason: HOSPADM

## 2021-08-28 RX ORDER — LIDOCAINE 40 MG/G
CREAM TOPICAL
Status: DISCONTINUED | OUTPATIENT
Start: 2021-08-28 | End: 2021-08-28 | Stop reason: HOSPADM

## 2021-08-28 RX ORDER — OXYCODONE HYDROCHLORIDE 5 MG/1
5 TABLET ORAL EVERY 4 HOURS PRN
Status: DISCONTINUED | OUTPATIENT
Start: 2021-08-28 | End: 2021-08-28 | Stop reason: HOSPADM

## 2021-08-28 RX ORDER — DIPHENHYDRAMINE HYDROCHLORIDE 50 MG/ML
25 INJECTION INTRAMUSCULAR; INTRAVENOUS EVERY 6 HOURS PRN
Status: DISCONTINUED | OUTPATIENT
Start: 2021-08-28 | End: 2021-08-28 | Stop reason: HOSPADM

## 2021-08-28 RX ORDER — ONDANSETRON 2 MG/ML
4 INJECTION INTRAMUSCULAR; INTRAVENOUS EVERY 30 MIN PRN
Status: DISCONTINUED | OUTPATIENT
Start: 2021-08-28 | End: 2021-08-28 | Stop reason: HOSPADM

## 2021-08-28 RX ORDER — FAMOTIDINE 20 MG/1
20 TABLET, FILM COATED ORAL 2 TIMES DAILY
Status: DISCONTINUED | OUTPATIENT
Start: 2021-08-28 | End: 2021-08-28

## 2021-08-28 RX ORDER — ACETAMINOPHEN 325 MG/1
975 TABLET ORAL EVERY 8 HOURS
Status: DISCONTINUED | OUTPATIENT
Start: 2021-08-28 | End: 2021-08-30 | Stop reason: HOSPADM

## 2021-08-28 RX ORDER — HYDROMORPHONE HYDROCHLORIDE 1 MG/ML
0.4 INJECTION, SOLUTION INTRAMUSCULAR; INTRAVENOUS; SUBCUTANEOUS EVERY 5 MIN PRN
Status: DISCONTINUED | OUTPATIENT
Start: 2021-08-28 | End: 2021-08-28 | Stop reason: HOSPADM

## 2021-08-28 RX ORDER — AMOXICILLIN 250 MG
1 CAPSULE ORAL 2 TIMES DAILY
Status: DISCONTINUED | OUTPATIENT
Start: 2021-08-28 | End: 2021-08-30 | Stop reason: HOSPADM

## 2021-08-28 RX ADMIN — MIDAZOLAM HYDROCHLORIDE 2 MG: 1 INJECTION, SOLUTION INTRAMUSCULAR; INTRAVENOUS at 10:05

## 2021-08-28 RX ADMIN — OXYCODONE HYDROCHLORIDE 5 MG: 5 TABLET ORAL at 23:29

## 2021-08-28 RX ADMIN — ATORVASTATIN CALCIUM 40 MG: 40 TABLET, FILM COATED ORAL at 21:28

## 2021-08-28 RX ADMIN — TAZOBACTAM SODIUM AND PIPERACILLIN SODIUM 3.38 G: 375; 3 INJECTION, SOLUTION INTRAVENOUS at 03:09

## 2021-08-28 RX ADMIN — SODIUM CHLORIDE 25 MCG: 900 INJECTION, SOLUTION INTRAVENOUS at 10:20

## 2021-08-28 RX ADMIN — TAMSULOSIN HYDROCHLORIDE 0.4 MG: 0.4 CAPSULE ORAL at 09:30

## 2021-08-28 RX ADMIN — SODIUM CHLORIDE, POTASSIUM CHLORIDE, SODIUM LACTATE AND CALCIUM CHLORIDE: 600; 310; 30; 20 INJECTION, SOLUTION INTRAVENOUS at 23:26

## 2021-08-28 RX ADMIN — TAZOBACTAM SODIUM AND PIPERACILLIN SODIUM 3.38 G: 375; 3 INJECTION, SOLUTION INTRAVENOUS at 21:37

## 2021-08-28 RX ADMIN — SODIUM CHLORIDE 25 MCG: 900 INJECTION, SOLUTION INTRAVENOUS at 10:15

## 2021-08-28 RX ADMIN — Medication 30 MG: at 10:05

## 2021-08-28 RX ADMIN — BUPIVACAINE HYDROCHLORIDE 10 ML: 5 INJECTION, SOLUTION EPIDURAL; INTRACAUDAL; PERINEURAL at 10:15

## 2021-08-28 RX ADMIN — ESCITALOPRAM OXALATE 20 MG: 10 TABLET ORAL at 09:29

## 2021-08-28 RX ADMIN — METOPROLOL SUCCINATE 25 MG: 25 TABLET, EXTENDED RELEASE ORAL at 21:28

## 2021-08-28 RX ADMIN — PROPOFOL 200 MG: 10 INJECTION, EMULSION INTRAVENOUS at 10:24

## 2021-08-28 RX ADMIN — VANCOMYCIN HYDROCHLORIDE 1000 MG: 1 INJECTION, POWDER, LYOPHILIZED, FOR SOLUTION INTRAVENOUS at 02:02

## 2021-08-28 RX ADMIN — TAZOBACTAM SODIUM AND PIPERACILLIN SODIUM 3.38 G: 375; 3 INJECTION, SOLUTION INTRAVENOUS at 09:31

## 2021-08-28 RX ADMIN — SODIUM CHLORIDE, POTASSIUM CHLORIDE, SODIUM LACTATE AND CALCIUM CHLORIDE: 600; 310; 30; 20 INJECTION, SOLUTION INTRAVENOUS at 14:23

## 2021-08-28 RX ADMIN — DOCUSATE SODIUM 50 MG AND SENNOSIDES 8.6 MG 1 TABLET: 8.6; 5 TABLET, FILM COATED ORAL at 14:26

## 2021-08-28 RX ADMIN — LISINOPRIL 20 MG: 20 TABLET ORAL at 09:30

## 2021-08-28 RX ADMIN — FAMOTIDINE 20 MG: 20 TABLET ORAL at 09:30

## 2021-08-28 RX ADMIN — INSULIN DETEMIR 25 UNITS: 100 INJECTION, SOLUTION SUBCUTANEOUS at 21:29

## 2021-08-28 RX ADMIN — ACETAMINOPHEN 1000 MG: 500 TABLET, FILM COATED ORAL at 03:43

## 2021-08-28 RX ADMIN — DEXTROSE MONOHYDRATE 25 ML: 500 INJECTION PARENTERAL at 07:43

## 2021-08-28 RX ADMIN — DEXTROSE AND SODIUM CHLORIDE: 5; 450 INJECTION, SOLUTION INTRAVENOUS at 09:50

## 2021-08-28 RX ADMIN — ACETAMINOPHEN 975 MG: 325 TABLET, FILM COATED ORAL at 21:27

## 2021-08-28 RX ADMIN — FAMOTIDINE 20 MG: 20 TABLET ORAL at 21:28

## 2021-08-28 RX ADMIN — DOCUSATE SODIUM 50 MG AND SENNOSIDES 8.6 MG 1 TABLET: 8.6; 5 TABLET, FILM COATED ORAL at 21:27

## 2021-08-28 RX ADMIN — BUPIVACAINE HYDROCHLORIDE 10 ML: 5 INJECTION, SOLUTION EPIDURAL; INTRACAUDAL; PERINEURAL at 10:20

## 2021-08-28 RX ADMIN — ONDANSETRON HYDROCHLORIDE 4 MG: 2 SOLUTION INTRAMUSCULAR; INTRAVENOUS at 10:24

## 2021-08-28 RX ADMIN — TAZOBACTAM SODIUM AND PIPERACILLIN SODIUM 3.38 G: 375; 3 INJECTION, SOLUTION INTRAVENOUS at 16:21

## 2021-08-28 RX ADMIN — ACETAMINOPHEN 975 MG: 325 TABLET, FILM COATED ORAL at 14:26

## 2021-08-28 RX ADMIN — OXYCODONE HYDROCHLORIDE 5 MG: 5 TABLET ORAL at 00:06

## 2021-08-28 RX ADMIN — HYDRALAZINE HYDROCHLORIDE 10 MG: 20 INJECTION INTRAMUSCULAR; INTRAVENOUS at 18:27

## 2021-08-28 RX ADMIN — LIDOCAINE HYDROCHLORIDE 40 MG: 20 INJECTION, SOLUTION INFILTRATION; PERINEURAL at 10:24

## 2021-08-28 ASSESSMENT — ACTIVITIES OF DAILY LIVING (ADL)
ADLS_ACUITY_SCORE: 17

## 2021-08-28 ASSESSMENT — MIFFLIN-ST. JEOR: SCORE: 1589.6

## 2021-08-28 NOTE — PROGRESS NOTES
MD updated of low blood glucose this am. Blood glucose 52. Dextrose given IV and blood glucose on recheck was 108. V.O. to hold Levemir this am as patient is NPO for surgery and order to start D51/2NS at 25 mL/hr. Also verbal ok to give all po scheduled meds this am.

## 2021-08-28 NOTE — ANESTHESIA POSTPROCEDURE EVALUATION
Patient: Adam Duarte    Procedure(s):  AMPUTATION, BELOW KNEE    Diagnosis:Diabetic ulcer of right midfoot associated with type 2 diabetes mellitus, with muscle involvement without evidence of necrosis (H) [E11.621, L97.176]  Diagnosis Additional Information: No value filed.    Anesthesia Type:  General    Note:  Disposition: Inpatient   Postop Pain Control: Uneventful            Sign Out: Well controlled pain   PONV: No   Neuro/Psych: Uneventful            Sign Out: Acceptable/Baseline neuro status   Airway/Respiratory: Uneventful            Sign Out: Acceptable/Baseline resp. status   CV/Hemodynamics: Uneventful            Sign Out: Acceptable CV status; No obvious hypovolemia; No obvious fluid overload   Other NRE: NONE   DID A NON-ROUTINE EVENT OCCUR? No           Last vitals:  Vitals Value Taken Time   /78 08/28/21 1325   Temp 97.5  F (36.4  C) 08/28/21 1325   Pulse 51 08/28/21 1327   Resp 20 08/28/21 1327   SpO2 95 % 08/28/21 1327   Vitals shown include unvalidated device data.    Electronically Signed By: YI Moise CRNA  August 28, 2021  1:32 PM

## 2021-08-28 NOTE — PLAN OF CARE
Patient is alert and oriented x 4. VS 98.4-51-/66-98% RA. He had Right BKA this morning. He denies any pain at this time. Extremity elevated and ice applied. Stump is wrapped with ACE bandage that is CDI. Order to not change dressing for 48 hours. He is voiding without any difficulty. He has eaten pudding and jello, and denies any N/V. LS clear. He sat on the edge of his bed for a few minutes. Denied any dizziness. Able to pivot transfer with assist of 2, transfer belt and walker from bed into recliner chair. Blood glucose was low this am at 52. Patient was NPO, so Dextrose 50% 25 mL was given IV. At recheck, his blood glucose was 109. His Levemir was held this am. PACU reported blood glucose of 145 after surgery. Patient is now on a Regular diet. Will continue to monitor.

## 2021-08-28 NOTE — ANESTHESIA CARE TRANSFER NOTE
Patient: Adam Duarte    Procedure(s):  AMPUTATION, BELOW KNEE    Diagnosis: Diabetic ulcer of right midfoot associated with type 2 diabetes mellitus, with muscle involvement without evidence of necrosis (H) [E11.621, L97.970]  Diagnosis Additional Information: No value filed.    Anesthesia Type:   General     Note:    Oropharynx: oral airway in place and spontaneously breathing  Level of Consciousness: drowsy  Oxygen Supplementation: face mask  Level of Supplemental Oxygen (L/min / FiO2): 8  Independent Airway: airway patency satisfactory and stable  Dentition: dentition unchanged  Vital Signs Stable: post-procedure vital signs reviewed and stable  Report to RN Given: handoff report given  Patient transferred to: PACU    Handoff Report: Identifed the Patient, Identified the Reponsible Provider, Reviewed the pertinent medical history, Discussed the surgical course, Reviewed Intra-OP anesthesia mangement and issues during anesthesia, Set expectations for post-procedure period and Allowed opportunity for questions and acknowledgement of understanding      Vitals:  Vitals Value Taken Time   /82 08/28/21 1257   Temp     Pulse 53 08/28/21 1300   Resp 7 08/28/21 1300   SpO2 98 % 08/28/21 1300   Vitals shown include unvalidated device data.    Electronically Signed By: YI Moise CRNA  August 28, 2021  1:01 PM

## 2021-08-28 NOTE — PROGRESS NOTES
SAFETY CHECKLIST  ID Bands and Risk clasps correct and in place (DNR, Fall risk, Allergy, Latex, Limb):  Yes  All Lines Reconciled and labeled correctly: Yes  Whiteboard updated:Yes  Environmental interventions (bed/chair alarm on, call light, side rails, restraints, sitter....): Yes  Verify Tele #: N/A

## 2021-08-28 NOTE — ANESTHESIA PROCEDURE NOTES
Popliteal Procedure Note  Pre-Procedure   Staff -        CRNA: David Mahajan APRN CRNA       Performed By: CRNA       Location: OR       Procedure Start/Stop Times: 8/28/2021 10:15 AM and 8/28/2021 10:20 AM       Pre-Anesthestic Checklist: patient identified, IV checked, site marked, risks and benefits discussed, informed consent, monitors and equipment checked, pre-op evaluation, at physician/surgeon's request and post-op pain management  Timeout:       Correct Patient: Yes        Correct Procedure: Yes        Correct Site: Yes        Correct Position: Yes        Correct Laterality: Yes        Site Marked: Yes  Procedure Documentation  Procedure: Popliteal       Diagnosis: POST-OPERATIVE PAIN CONTROL FOR RIGHT BELOW THE KNEE AMPUTATION       Laterality: right       Patient Position: supine       Patient Prep/Sterile Barriers: sterile gloves, mask       Skin prep: Chloraprep       Local skin infiltrated with 0.5 mL of 1% lidocaine.        Needle Type: insulated and short bevel       Needle Gauge: 21.        Needle Length (Inches): 4        Ultrasound guided       1. Ultrasound was used to identify targeted nerve, plexus, vascular marker, or fascial plane and place a needle adjacent to it in real-time.       2. Ultrasound was used to visualize the spread of anesthetic in close proximity to the above referenced structure.       3. A permanent image is entered into the patient's record.       4. The visualized anatomic structures appeared normal.       5. There were no apparent abnormal pathologic findings.       Nerve Stim: Initial Level 0.6 mA.  Lowest motor response 0.4 mA.    Assessment/Narrative         The placement was negative for: blood aspirated, painful injection and site bleeding       Paresthesias: No.     Bolus given via needle..        Secured via.        Insertion/Infusion Method: Single Shot       Complications: none       Injection made incrementally with aspirations every 5 mL.    Comments:  No  complications encountered. Pt tolerated procedure well.

## 2021-08-28 NOTE — OP NOTE
Procedure Date: 08/28/2021    PREOPERATIVE DIAGNOSIS:  Dry gangrene of the right foot.    POSTOPERATIVE DIAGNOSIS:  Dry gangrene of the right foot.    PROCEDURE:  Right below-knee amputation.    SURGEON:  Yan Bear MD    ASSISTANT:   RAUL Shepherd    ANESTHESIA:  General block with local.    ESTIMATED BLOOD LOSS:  About 100 mL    DRAINS:  None.    SPECIMENS:  Include the right below-knee amputation specimen.    DESCRIPTION OF PROCEDURE:  The patient was taken to the OR and positioned supine on the operating table.  A block was administered.  After this, we began to prep the lower leg.  We then planned our incision.  This was giving ample length of a stump below the tibial tuberosity.  With this, we mapped out a fish-mouth type incision for a posterior flap and a linear incision for the anterior flap.  This was left to be long.  We began cauterizing down to the level of fascia.  Venous structures were closed with ties or cautery.  Incised the fascia, we took the anterior tibial muscle.  We controlled the venous structures here and arterial structures with ties again.  We then freed the periosteum and elevated it.  We retracted this about 3.5 cm cephalad.  We then transected the bone, making a wedge-type incision on the anterior surface of the tibia.  We then proceeded to divide additional muscle structures in the medial compartment.  We dissected further down in the lateral compartment down to the fibula.  We then freed the periosteum of the fibula and elevated the periosteum cephalad.  We then divided the tibia.  Here, we encountered a fibular nerve; this was retracted and divided.  We then proceeded to divide the muscle.  We continued our dissection through dividing and suture ligating the tibioperoneal trunk.  The sural nerve and sural vein were also identified, ligated, retracted and divided.  We then proceeded to complete our transection through the soleus.  We then sized up our upper flap.  The medial  and lateral corners were excised to remove dog ear-type redundant skin.  We had adequate skin and muscle flap to cover the stump.      We then began closing.  We closed with a #1 Vicryl and closed the flap with this, we then closed the fascia with 2-0 Vicryl.  We then closed the subcutaneous tissues with 3-0 Vicryl.  The skin was then closed with 4-0 nylon in a vertical mattress fashion.  Xeroform, fluffs, Kerlix and an Ace wrap were applied for dressing.  The patient was taken to recovery in satisfactory condition.      Yan Bear MD        D: 2021   T: 2021   MT: PAKMT    Name:     HERBERT IVAN  MRN:      [id number]        Account:        [id number]   :      1955           Procedure Date: 2021     Document: [id number]    cc:  MD Ralph Dey MD

## 2021-08-28 NOTE — OR NURSING
PACU Transfer Note    Adam Duarte was transferred to [facility] via bed.  Equipment used for transport:  none.  Accompanied by:  Nursing staff  Prescriptions were: none    PACU Respiratory Event Documentation     1) Episodes of Apnea greater than or equal to 10 seconds: 0    2) Bradypnea - less than 8 breaths per minute: 0    3) Pain score on 0 to 10 scale: 0    4) Pain-sedation mismatch (yes or no): no    5) Repeated 02 desaturation less than 90% (yes or no): no    Anesthesia notified? (yes or no): na    Any of the above events occuring repeatedly in separate 30 minute intervals may be considered recurrent PACU respiratory events.    Patient stable and meets phase 1 discharge criteria for transport from PACU.

## 2021-08-28 NOTE — PROGRESS NOTES
MD updated of BP of 184/79 and elevated BP Hx. Patient received scheduled Lisinopril 20mg this am prior to surgery and is not due for Metoprolol until HS. MD will place order for PRN antihypertensive. Will continue to monitor.

## 2021-08-28 NOTE — PROGRESS NOTES
[facility]    Medicine Progress Note - Hospitalist Service       Date of Admission:  8/19/2021    Assessment & Plan                            Adam Duarte is a 66 year old male admitted on 8/19/2021. He has a diabetic foot ulcer.    Despite debridement, offloading and excellent glycemic control patient's wound has not healed and is has exhibited signs and symptoms of worsening dry gangrene since yesterday.    He has consented to undergoing BKA today.    Principal Problem:  Diabetic ulcer of right foot (H) got zosyn and vanco in ER. Now on clinda/cipro.  Assessment: present on admission, transfer from [place]. ED provider obtained a xray there which shows no osteomyelitis.  MRI was done 08/23/2021 and showed significant soft tissue wound and edema but no obvious osteomyelitis.  Fever has resolved.  Pain improved.  CRP improving.  Wound however has worsened substantially over the past 48 hours.  -Blood culture with no growth.  No tissue culture done.  -continue IV zosyn/vancomycin  -BKA today.    Coronary atherosclerosis  Assessment: chronic, stable    DMII (diabetes mellitus, type 2) (H) with hypoglycemia. Likely due to different diet in hospital.   Assessment: hemoglobin A1c 11.1. Chronic poor control, but good control inpatient.  Patient reports poor diet at home.  -Patient n.p.o. since midnight.  Insulin this morning has been held.  Had low blood sugar this morning.  Normalized with dextrose.  Will start D5 half-normal saline at TKO until he is able to resume oral intake.    Essential hypertension, blood pressure continues to be elevated.  Assessment: chronic  -Continue home metoprolol, tamsulosin  -Continue lisinopril 20 mg daily.  Creatinine stable.    GERD (gastroesophageal reflux disease)  Assessment: chronic  Plan: pepcid    S/P CABG (coronary artery bypass graft)  Assessment: on chronic plavix and statin.  Plavix last given at 932 yesterday morning.               Diet: NPO per  Anesthesia Guidelines for Procedure/Surgery Except for: Meds    DVT Prophylaxis: Enoxaparin (Lovenox) SQ  Hale Catheter: Not present  Central Lines: None  Code Status: Full Code      Disposition Plan   Expected discharge: Likely to snf next week.     The patient's care was discussed with the Patient who reports he has updated his mother.    Be Faust MD  Hospitalist Service  [facility]  Securely message with the Vocera Web Console (learn more here)  Text page via [employer] Paging/Directory      Clinically Significant Risk Factors Present on Admission                ______________________________________________________________________    Interval History   Patient reports having had a good night.  Denies any chest pain, shortness of breath or other anginal equivalents.  Denies any abdominal pain.  Ate a good dinner yesterday.  Has been n.p.o. since midnight.  This morning had low blood sugar, last insulin given last night.  Was given dextrose and blood sugar now normalized.  Was asymptomatic.  Reports foot hurts, not really changed at all from yesterday.    No fevers or chills.  Patient understands risk benefits and alternatives for the surgery.  Wishes to proceed.    Data reviewed today: I reviewed all medications, new labs and imaging results over the last 24 hours. I personally reviewed no images or EKG's today.    Physical Exam   Vital Signs: Temp: 98  F (36.7  C) Temp src: Tympanic BP: (!) 180/75 Pulse: 60   Resp: 16 SpO2: 96 % O2 Device: None (Room air)    Weight: 185 lbs 6.4 oz  Constitutional: awake, alert, cooperative, no apparent distress, and appears stated age  Respiratory: No increased work of breathing, good air exchange, clear to auscultation bilaterally, no crackles or wheezing  Cardiovascular: Regular rate and rhythm.  No murmurs rubs or gallops.  No lower extremity edema  GI: Abdomen soft, nontender  Skin: Worsening dry gangrene of the right foot with blackening of first  3 toes and starting to progress up the forefoot.  No longer any appreciable granulation tissue.  No purulence or evidence of active infection.  Neuropsychiatric: General: normal, calm and normal eye contact  Orientation: oriented to self, place, time and situation  Memory and insight: memory for past and recent events intact and thought process normal    Data   Recent Labs   Lab 08/28/21  0547 08/27/21  0850 08/26/21  1308 08/25/21  0530 08/23/21  1241   WBC  --  8.6  --  7.7 8.5   HGB  --  10.1*  --  9.1* 9.9*   MCV  --  87  --  89 88    328  --  280 285    138  --  137 134   POTASSIUM 3.8 3.6  --  3.7 4.0   CHLORIDE 104 103  --  103 102   CO2 25 27  --  28 29   BUN 10 11  --  15 10   CR 0.80  0.80 0.81 0.88 1.07 0.84   ANIONGAP 8 8  --  6 3   AMAURY 8.5* 8.7  --  8.4* 8.8   GLC 52* 94  --  99 109*     No results found for this or any previous visit (from the past 24 hour(s)).

## 2021-08-28 NOTE — ANESTHESIA PREPROCEDURE EVALUATION
Anesthesia Pre-Procedure Evaluation    Patient: Adam Duarte   MRN: [id number] : 1955        Preoperative Diagnosis: Diabetic ulcer of right midfoot associated with type 2 diabetes mellitus, with muscle involvement without evidence of necrosis (H) [E11.621, L97.415]   Procedure : Procedure(s):  AMPUTATION, BELOW KNEE     Past Medical History:   Diagnosis Date     Coronary atherosclerosis 2014     Diabetic ulcer of left foot (H) 2021     DMII (diabetes mellitus, type 2) (H) 10/18/2013     Essential hypertension 10/18/2013    Formatting of this note might be different from the original. IMO Update     GERD (gastroesophageal reflux disease) 2013     S/P CABG (coronary artery bypass graft) 2015      Past Surgical History:   Procedure Laterality Date     AS CABG, ARTERIAL, THREE        No Known Allergies   Social History     Tobacco Use     Smoking status: Not on file   Substance Use Topics     Alcohol use: Not on file      Wt Readings from Last 1 Encounters:   21 84.1 kg (185 lb 6.4 oz)        Anesthesia Evaluation   Pt has had prior anesthetic.     No history of anesthetic complications       ROS/MED HX  ENT/Pulmonary:  - neg pulmonary ROS     Neurologic:     (+) peripheral neuropathy,     Cardiovascular:     (+) hypertension--CAD -past MI CABG--MARTINEZ.     METS/Exercise Tolerance: 4 - Raking leaves, gardening    Hematologic:  - neg hematologic  ROS     Musculoskeletal: Comment: Poor circulation right foot      GI/Hepatic: Comment: No active sx of reflux    (+) GERD,     Renal/Genitourinary:  - neg Renal ROS     Endo:     (+) type II DM,     Psychiatric/Substance Use:  - neg psychiatric ROS     Infectious Disease:  - neg infectious disease ROS     Malignancy:  - neg malignancy ROS     Other:  - neg other ROS          Physical Exam    Airway        Mallampati: II    Neck ROM: full   Mouth opening: > 3 cm    Respiratory Devices and Support         Dental     Comment: Missing all teeth     (+) missing      Cardiovascular   cardiovascular exam normal       Rhythm and rate: regular     Pulmonary   pulmonary exam normal        breath sounds clear to auscultation           OUTSIDE LABS:  CBC:   Lab Results   Component Value Date    WBC 8.6 08/27/2021    WBC 7.7 08/25/2021    HGB 10.1 (L) 08/27/2021    HGB 9.1 (L) 08/25/2021    HCT 30.0 (L) 08/27/2021    HCT 27.1 (L) 08/25/2021     08/28/2021     08/27/2021     BMP:   Lab Results   Component Value Date     08/27/2021     08/25/2021    POTASSIUM 3.6 08/27/2021    POTASSIUM 3.7 08/25/2021    CHLORIDE 103 08/27/2021    CHLORIDE 103 08/25/2021    CO2 27 08/27/2021    CO2 28 08/25/2021    BUN 11 08/27/2021    BUN 15 08/25/2021    CR 0.80 08/28/2021    CR 0.81 08/27/2021    GLC 94 08/27/2021    GLC 99 08/25/2021     COAGS: No results found for: PTT, INR, FIBR  POC: No results found for: BGM, HCG, HCGS  HEPATIC: No results found for: ALBUMIN, PROTTOTAL, ALT, AST, GGT, ALKPHOS, BILITOTAL, BILIDIRECT, MARCO A  OTHER:   Lab Results   Component Value Date    A1C 11.1 (H) 08/20/2021    AMAURY 8.7 08/27/2021    CRP 52.0 (H) 08/27/2021       Anesthesia Plan    ASA Status:  3   NPO Status:  NPO Appropriate    Anesthesia Type: General.     - Airway: LMA   Induction: Intravenous, Propofol.   Maintenance: Balanced.        Consents    Anesthesia Plan(s) and associated risks, benefits, and realistic alternatives discussed. Questions answered and patient/representative(s) expressed understanding.     - Discussed with:  Patient      - Extended Intubation/Ventilatory Support Discussed: No.      - Patient is DNR/DNI Status: No    Use of blood products discussed: No .     Postoperative Care    Pain management: IV analgesics, Peripheral nerve block (Single Shot) (Right femoral block, right popliteal block).   PONV prophylaxis: Ondansetron (or other 5HT-3)     Comments:                YI Moise CRNA

## 2021-08-28 NOTE — PROGRESS NOTES
GENERAL SURGERY PROGRESS NOTE  2021      Interval history:   The foot has become mummified. Discussed BKA yesterday. Will proceed today.     Physical Exam:   Temp: 98  F (36.7  C) Temp  Min: 97.4  F (36.3  C)  Max: 98.7  F (37.1  C)  Resp: 16 Resp  Min: 16  Max: 16  SpO2: 96 % SpO2  Min: 96 %  Max: 99 %  Pulse: 60 Pulse  Min: 55  Max: 60    No data recorded  BP: (!) 186/87   Systolic (24hrs), Av , Min:154 , Max:188   Diastolic (24hrs), Av, Min:67, Max:91        General: laying in bed, appears comfortable   MSK: The right foot has been debrided to the level of the tendon on the first 3 toes.  The toes and remaining foot appear to be mummified.  Neuro: alert and oriented     CTA: No flow-limiting stenosis of the aorta through the popliteal arteries.   3 vessel runoff to the right foot complicated by high-grade narrowing  of the anterior tibial and peroneal arteries.   Two-vessel runoff to the left foot, with occlusion of the left  anterior tibial artery and multifocal peroneal artery high-grade  stenoses.      Assessment / Plan:   Adam Duarte is a 66 year old male with diabetic foot ulcer.  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  He does not here appear to have microvascular circulation to the distal foot.  It does not appear that a midfoot amputation will be able to heal either.      Will proceed with surgery today. 2021       Yan Bear MD on 2021 at 9:51 AM

## 2021-08-28 NOTE — PLAN OF CARE
Patient plans to have amputation 8/28, surgical consent signed by patient. Pre-op checklist started NPO status initiated. Wound assessed and new wet-to-dry dressing placed using Dakins. Legs elevated in bed. Prn oxycodone and tylenol given for pain. BP elevated, scheduled BP night time medications given. Receiving zosyn and vanco. Fine crackles in bases upon ausculation. Hypertensive but otherwise vitally stable.

## 2021-08-28 NOTE — ANESTHESIA PROCEDURE NOTES
Femoral Procedure Note  Pre-Procedure   Staff -        CRNA: David Mahajan APRN CRNA       Performed By: CRNA       Location: OR       Procedure Start/Stop Times: 8/28/2021 10:10 AM and 8/28/2021 10:15 AM       Pre-Anesthestic Checklist: patient identified, IV checked, site marked, risks and benefits discussed, informed consent, monitors and equipment checked, pre-op evaluation, at physician/surgeon's request and post-op pain management  Timeout:       Correct Patient: Yes        Correct Procedure: Yes        Correct Site: Yes        Correct Position: Yes        Correct Laterality: Yes        Site Marked: Yes  Procedure Documentation  Procedure: Femoral       Diagnosis: POST-OPERATIVE PAIN CONTROL FOR BELOW THE KNEE AMPUTATION       Laterality: right       Patient Position: supine       Patient Prep/Sterile Barriers: sterile gloves, mask       Skin prep: Chloraprep       Local skin infiltrated with 0.5 mL of 1% lidocaine.        Needle Type: insulated and short bevel       Needle Gauge: 21.        Needle Length (Inches): 4        Ultrasound guided       1. Ultrasound was used to identify targeted nerve, plexus, vascular marker, or fascial plane and place a needle adjacent to it in real-time.       2. Ultrasound was used to visualize the spread of anesthetic in close proximity to the above referenced structure.       3. A permanent image is entered into the patient's record.       4. The visualized anatomic structures appeared normal.       5. There were no apparent abnormal pathologic findings.       Nerve Stim: Initial Level 0.6 mA.  Lowest motor response 0.6 mA.    Assessment/Narrative         The placement was negative for: blood aspirated, painful injection and site bleeding       Paresthesias: No.     Bolus given via needle..        Secured via.        Insertion/Infusion Method: Single Shot       Complications: other       Injection made incrementally with aspirations every 5 mL.    Comments:  Pt tolerated  procedure well.  No complications encountered.

## 2021-08-28 NOTE — PHARMACY
Pharmacy - Transfer Medication Reconciliation     The patient's transfer medication orders have been compared to the medication administration record and to the Prior to Admissions Medications list - any noted discrepancies were resolved with the MD.     Thank you. Pharmacy will continue to monitor.     Margy Crouch [facility] ....................  8/28/2021   6:02 PM

## 2021-08-28 NOTE — BRIEF OP NOTE
[facility]    Brief Operative Note    Pre-operative diagnosis: Diabetic ulcer of right midfoot associated with type 2 diabetes mellitus, with muscle involvement without evidence of necrosis (H) [E11.621, L97.415]  Post-operative diagnosis Same as pre-operative diagnosis    Procedure: Procedure(s):  AMPUTATION, BELOW KNEE  Surgeon: Surgeon(s) and Role:     * Yan Bear MD - Primary  Anesthesia: General   Estimated blood loss: Less than 100 ml  Drains: None  Specimens:   ID Type Source Tests Collected by Time Destination   1 :  Amputation, Non-Traumatic Leg, Below Knee, Right SURGICAL PATHOLOGY EXAM Yan Bear MD 8/28/2021 11:56 AM      Findings:   None.  Complications: None.  Implants: * No implants in log *

## 2021-08-29 ENCOUNTER — APPOINTMENT (OUTPATIENT)
Dept: PHYSICAL THERAPY | Facility: OTHER | Age: 66
End: 2021-08-29
Attending: FAMILY MEDICINE
Payer: COMMERCIAL

## 2021-08-29 ENCOUNTER — APPOINTMENT (OUTPATIENT)
Dept: OCCUPATIONAL THERAPY | Facility: OTHER | Age: 66
End: 2021-08-29
Attending: FAMILY MEDICINE
Payer: COMMERCIAL

## 2021-08-29 LAB
ANION GAP SERPL CALCULATED.3IONS-SCNC: 8 MMOL/L (ref 3–14)
BUN SERPL-MCNC: 8 MG/DL (ref 7–25)
CALCIUM SERPL-MCNC: 8.2 MG/DL (ref 8.6–10.3)
CHLORIDE BLD-SCNC: 106 MMOL/L (ref 98–107)
CO2 SERPL-SCNC: 24 MMOL/L (ref 21–31)
CREAT SERPL-MCNC: 0.75 MG/DL (ref 0.7–1.3)
ERYTHROCYTE [DISTWIDTH] IN BLOOD BY AUTOMATED COUNT: 13.6 % (ref 10–15)
GFR SERPL CREATININE-BSD FRML MDRD: >90 ML/MIN/1.73M2
GLUCOSE BLD-MCNC: 126 MG/DL (ref 70–105)
HCT VFR BLD AUTO: 29 % (ref 40–53)
HGB BLD-MCNC: 9.5 G/DL (ref 13.3–17.7)
MCH RBC QN AUTO: 28.9 PG (ref 26.5–33)
MCHC RBC AUTO-ENTMCNC: 32.8 G/DL (ref 31.5–36.5)
MCV RBC AUTO: 88 FL (ref 78–100)
PLATELET # BLD AUTO: 338 10E3/UL (ref 150–450)
POTASSIUM BLD-SCNC: 3.8 MMOL/L (ref 3.5–5.1)
RBC # BLD AUTO: 3.29 10E6/UL (ref 4.4–5.9)
SODIUM SERPL-SCNC: 138 MMOL/L (ref 134–144)
WBC # BLD AUTO: 9.2 10E3/UL (ref 4–11)

## 2021-08-29 PROCEDURE — 250N000013 HC RX MED GY IP 250 OP 250 PS 637: Performed by: FAMILY MEDICINE

## 2021-08-29 PROCEDURE — 80048 BASIC METABOLIC PNL TOTAL CA: CPT | Performed by: FAMILY MEDICINE

## 2021-08-29 PROCEDURE — 250N000011 HC RX IP 250 OP 636: Performed by: SURGERY

## 2021-08-29 PROCEDURE — 99232 SBSQ HOSP IP/OBS MODERATE 35: CPT | Performed by: FAMILY MEDICINE

## 2021-08-29 PROCEDURE — 250N000013 HC RX MED GY IP 250 OP 250 PS 637: Performed by: INTERNAL MEDICINE

## 2021-08-29 PROCEDURE — 36415 COLL VENOUS BLD VENIPUNCTURE: CPT | Performed by: FAMILY MEDICINE

## 2021-08-29 PROCEDURE — 250N000013 HC RX MED GY IP 250 OP 250 PS 637: Performed by: SURGERY

## 2021-08-29 PROCEDURE — 97161 PT EVAL LOW COMPLEX 20 MIN: CPT | Mod: GP

## 2021-08-29 PROCEDURE — 97535 SELF CARE MNGMENT TRAINING: CPT | Mod: GO | Performed by: OCCUPATIONAL THERAPIST

## 2021-08-29 PROCEDURE — 97165 OT EVAL LOW COMPLEX 30 MIN: CPT | Mod: GO | Performed by: OCCUPATIONAL THERAPIST

## 2021-08-29 PROCEDURE — 120N000001 HC R&B MED SURG/OB

## 2021-08-29 PROCEDURE — 97530 THERAPEUTIC ACTIVITIES: CPT | Mod: GO | Performed by: OCCUPATIONAL THERAPIST

## 2021-08-29 PROCEDURE — 85027 COMPLETE CBC AUTOMATED: CPT | Performed by: FAMILY MEDICINE

## 2021-08-29 PROCEDURE — 97530 THERAPEUTIC ACTIVITIES: CPT | Mod: GP

## 2021-08-29 RX ORDER — CLOPIDOGREL BISULFATE 75 MG/1
75 TABLET ORAL DAILY
Status: DISCONTINUED | OUTPATIENT
Start: 2021-08-29 | End: 2021-08-30 | Stop reason: HOSPADM

## 2021-08-29 RX ADMIN — METOPROLOL SUCCINATE 25 MG: 25 TABLET, EXTENDED RELEASE ORAL at 21:30

## 2021-08-29 RX ADMIN — TAZOBACTAM SODIUM AND PIPERACILLIN SODIUM 3.38 G: 375; 3 INJECTION, SOLUTION INTRAVENOUS at 05:07

## 2021-08-29 RX ADMIN — OXYCODONE HYDROCHLORIDE 10 MG: 5 TABLET ORAL at 09:28

## 2021-08-29 RX ADMIN — TAMSULOSIN HYDROCHLORIDE 0.4 MG: 0.4 CAPSULE ORAL at 11:08

## 2021-08-29 RX ADMIN — HYDROMORPHONE HYDROCHLORIDE 0.5 MG: 1 INJECTION, SOLUTION INTRAMUSCULAR; INTRAVENOUS; SUBCUTANEOUS at 21:30

## 2021-08-29 RX ADMIN — TAZOBACTAM SODIUM AND PIPERACILLIN SODIUM 3.38 G: 375; 3 INJECTION, SOLUTION INTRAVENOUS at 11:05

## 2021-08-29 RX ADMIN — ACETAMINOPHEN 975 MG: 325 TABLET, FILM COATED ORAL at 05:07

## 2021-08-29 RX ADMIN — ENOXAPARIN SODIUM 40 MG: 100 INJECTION SUBCUTANEOUS at 08:28

## 2021-08-29 RX ADMIN — ATORVASTATIN CALCIUM 40 MG: 40 TABLET, FILM COATED ORAL at 21:30

## 2021-08-29 RX ADMIN — OXYCODONE HYDROCHLORIDE 10 MG: 5 TABLET ORAL at 14:09

## 2021-08-29 RX ADMIN — ACETAMINOPHEN 975 MG: 325 TABLET, FILM COATED ORAL at 14:08

## 2021-08-29 RX ADMIN — FAMOTIDINE 20 MG: 20 TABLET ORAL at 21:30

## 2021-08-29 RX ADMIN — ACETAMINOPHEN 975 MG: 325 TABLET, FILM COATED ORAL at 21:30

## 2021-08-29 RX ADMIN — OXYCODONE HYDROCHLORIDE 10 MG: 5 TABLET ORAL at 05:14

## 2021-08-29 RX ADMIN — DOCUSATE SODIUM 50 MG AND SENNOSIDES 8.6 MG 1 TABLET: 8.6; 5 TABLET, FILM COATED ORAL at 21:30

## 2021-08-29 RX ADMIN — LISINOPRIL 20 MG: 20 TABLET ORAL at 11:08

## 2021-08-29 RX ADMIN — ESCITALOPRAM OXALATE 20 MG: 10 TABLET ORAL at 11:07

## 2021-08-29 RX ADMIN — OXYCODONE HYDROCHLORIDE 10 MG: 5 TABLET ORAL at 18:11

## 2021-08-29 RX ADMIN — CLOPIDOGREL BISULFATE 75 MG: 75 TABLET, FILM COATED ORAL at 11:45

## 2021-08-29 RX ADMIN — FAMOTIDINE 20 MG: 20 TABLET ORAL at 11:07

## 2021-08-29 ASSESSMENT — MIFFLIN-ST. JEOR: SCORE: 1579.63

## 2021-08-29 ASSESSMENT — ACTIVITIES OF DAILY LIVING (ADL)
ADLS_ACUITY_SCORE: 17

## 2021-08-29 NOTE — PROGRESS NOTES
08/29/21 1048   Quick Adds   Type of Visit Initial PT Evaluation   Living Environment   People in home parent(s);sibling(s);other (see comments)  (many relatives)   Current Living Arrangements mobile home   Home Accessibility wheelchair accessible   Living Environment Comments Pt reports he may go to [place] to live with his daughter and other relatives   Self-Care   Usual Activity Tolerance excellent   Current Activity Tolerance moderate   Equipment Currently Used at Home none   Disability/Function   Hearing Difficulty or Deaf no   Concentrating, Remembering or Making Decisions Difficulty no   Difficulty Communicating no   Difficulty Eating/Swallowing no   Walking or Climbing Stairs Difficulty no   Fall history within last six months no   Change in Functional Status Since Onset of Current Illness/Injury yes   General Information   Onset of Illness/Injury or Date of Surgery 08/28/21   Patient/Family Therapy Goals Statement (PT) To return home   Pertinent History of Current Problem (include personal factors and/or comorbidities that impact the POC) s/p R BKA   Weight-Bearing Status - RLE nonweight-bearing   General Observations decreased knee extension on R, reports it was tight before surgery   Cognition   Orientation Status (Cognition) oriented x 4   Pain Assessment   Patient Currently in Pain Yes, see Vital Sign flowsheet   Range of Motion (ROM)   ROM Comment decreased R knee extension   Bed Mobility   Bed Mobility supine-sit   Supine-Sit [place] (Bed Mobility) supervision   Bed Mobility Limitations decreased ability to use legs for bridging/pushing   Assistive Device (Bed Mobility) bed rails   Transfers   Transfers sit-stand transfer   Transfer Safety Concerns Noted decreased balance during turns   Impairments Contributing to Impaired Transfers impaired balance   Transfer Safety Comments v.c for hand placement   Sit-Stand Transfer   Sit-Stand [place] (Transfers) minimum assist (75% patient effort)    Assistive Device (Sit-Stand Transfers) walker, front-wheeled   Sit/Stand Transfer Comments A for initial standing balance   Gait/Stairs (Locomotion)   Assistive Device (Gait) walker, front-wheeled   Distance in Feet (Required for LE Total Joints) 2   Maintains Weight-bearing Status (Gait) able to maintain   Comment (Gait/Stairs) NWB on R, able to hop a few steps from bed to chair   Balance   Balance Comments mild instability with walker   Sensory Examination   Sensory Perception Comments decreased snesation in hands, phantom limp pain on R   Clinical Impression   Criteria for Skilled Therapeutic Intervention yes, treatment indicated   PT Diagnosis (PT) Impaired mobility   Influenced by the following impairments Impaired ROM   Clinical Presentation Stable/Uncomplicated   Clinical Decision Making (Complexity) low complexity   Therapy Frequency (PT) Daily   Predicted Duration of Therapy Intervention (days/wks) 2-3 days   Planned Therapy Interventions (PT) bed mobility training;ROM (range of motion);stretching;gait training;transfer training   Anticipated Equipment Needs at Discharge (PT) walker, rolling;wheelchair   Risk & Benefits of therapy have been explained care plan/treatment goals reviewed;evaluation/treatment results reviewed;risks/benefits reviewed   PT Discharge Planning    PT Discharge Recommendation (DC Rec) Transitional Care Facility   PT Rationale for DC Rec To increase I with mobility to allow return to [facility]   PT Brief overview of current status  Pt requies min a for transfers and SBA for bed mobility, ABle to hop a few steps with walker

## 2021-08-29 NOTE — PROGRESS NOTES
"   08/29/21 1026   Quick Adds   Type of Visit Initial Occupational Therapy Evaluation   Living Environment   People in home other relative(s)  (\"many people\")   Current Living Arrangements mobile home   Home Accessibility wheelchair accessible  (has a ramp)   Living Environment Comments pt reports he may go to live with daughter in [place] instead of returning to [place] home. Many family members apparently live at both places   Self-Care   Usual Activity Tolerance excellent   Current Activity Tolerance moderate   Equipment Currently Used at Home none   Disability/Function   Hearing Difficulty or Deaf no   Concentrating, Remembering or Making Decisions Difficulty no   Difficulty Communicating no   Difficulty Eating/Swallowing no   Dressing/Bathing Difficulty no   Toileting issues no   Doing Errands Independently Difficulty (such as shopping) yes   Fall history within last six months no   General Information   Additional Occupational Profile Info/Pertinent History of Current Problem pt s/p day #1 recent R BKA   General Observations and Info residual R LE wrapped without signs of drainage; pt unable to extend knee of R LE stating it was tight prior to surgery as well.   Cognitive Status Examination   Orientation Status orientation to person, place and time   Affect/Mental Status (Cognitive) WFL   Follows Commands follows one-step commands   Pain Assessment   Patient Currently in Pain Yes, see Vital Sign flowsheet   Range of Motion Comprehensive   General Range of Motion bilateral upper extremity ROM WFL   Bed Mobility   Bed Mobility supine-sit   Supine-Sit [place] (Bed Mobility) supervision   Assistive Device (Bed Mobility) bed rails   Transfers   Transfers bed-chair transfer   Transfer Comments pivot transfer bed<>chair with FWW and min assist x 2   Transfer Skill: Bed to Chair/Chair to Bed   Bed-Chair [place] (Transfers) minimum assist (75% patient effort)   Assistive Device (Bed-Chair Transfers) " rolling walker   Transfer Comments 2 person assist; able to pivot on L LE   Bathing Assessment   [place] Level (Bathing) moderate assist (50% patient effort)   Upper Body Dressing Assessment   [place] Level (Upper Body Dressing) set up   Lower Body Dressing Assessment   [place] Level (Lower Body Dressing) moderate assist (50% patient effort)   Clinical Impression   Criteria for Skilled Therapeutic Interventions Met (OT) yes   OT Diagnosis R BKA with decreased strength   Assessment of Occupational Performance 1-3 Performance Deficits   Identified Performance Deficits self cares, mobility   Planned Therapy Interventions (OT) ADL retraining;bed mobility training;progressive activity/exercise   Clinical Decision Making Complexity (OT) low complexity   Therapy Frequency (OT) Daily   Predicted Duration of Therapy 2-3 days   Anticipated Equipment Needs Upon Discharge (OT) walker, rolling;wheelchair   Risk & Benefits of therapy have been explained patient   OT Discharge Planning    OT Discharge Recommendation (DC Rec) Transitional Care Facility   OT Rationale for DC Rec pt now requires higher level of assist following surgery, would benefit from skilled OT to increase stability  with mobility and increased performance of self cares prior to transition back to independent living.   OT Brief overview of current status  pt currently requires min assist x 2 for pivot transfers with FWW; min/mod assist overall for LB self cares   Total Evaluation Time (Minutes)   Total Evaluation Time (Minutes) 15

## 2021-08-29 NOTE — PLAN OF CARE
Patient alert and oriented, vitals stable, BP elevated. Complaints of phantom pain in R ankle, PRN oxycodone administered per MAR with relief, ice packs in place. Stump is wrapped in ace bandage, CDI. Assist of two with walker to commode x 2 this shift, tolerating well. Lungs clear, bowels active.  Selma Brennan RN on 8/29/2021 at 6:02 AM

## 2021-08-29 NOTE — PROGRESS NOTES
[facility]    Medicine Progress Note - Hospitalist Service       Date of Admission:  8/19/2021    Assessment & Plan                                       Adam Duarte is a 66 year old male admitted on 8/19/2021. He has a diabetic foot ulcer.    Despite debridement, offloading and excellent glycemic control patient's wound has not healed and is has exhibited signs and symptoms of worsening dry gangrene since yesterday.    Patient underwent right BKA yesterday without complication.    Principal Problem:  Diabetic ulcer of right foot (H)   -Status post right BKA postoperative day #1.  Assessment: present on admission, transfer from [place]. ED provider obtained a xray there which shows no osteomyelitis.  MRI was done 08/23/2021 and showed significant soft tissue wound and edema but no obvious osteomyelitis.  Fever had resolved.  Wound however worsened substantially with evidence of dry gangrene on Friday and patient decided on BKA which was performed yesterday.    -Blood culture with no growth.  No tissue culture done.  -Stop zosyn.  Has been on days 1-3 and days 6-11.   -Stop vancomycin, has been on days 2-3 and 6-10.  -Was also on cipro and clindamycin days 3-6.        Coronary atherosclerosis  Assessment: chronic, stable    DMII (diabetes mellitus, type 2) (H) with hypoglycemia. Likely due to different diet in hospital.   Assessment: hemoglobin A1c 11.1. Chronic poor control, but good control inpatient.  Patient reports poor diet at home.  -BS has been excellent with some asymptomatic lows in hospital.  Chanve levemir to 20u at bedtime and 25u in AM with ssi novolog.    Essential hypertension, blood pressure excellent this morning.  Assessment: chronic  -Continue home metoprolol, tamsulosin  -Continue lisinopril 20 mg daily.  Creatinine stable.    GERD (gastroesophageal reflux disease)  Assessment: chronic  Plan: pepcid    S/P CABG (coronary artery bypass graft)  Assessment: on chronic  plavix and statin.  Plavix restarted after discussion with surgeon.                 Diet: Advance Diet as Tolerated: Regular Diet Adult    DVT Prophylaxis: Enoxaparin (Lovenox) SQ  Hale Catheter: Not present  Central Lines: None  Code Status: Full Code      Disposition Plan   Expected discharge: [facility] discharge tomorrow versus Tuesday to [facility].     The patient's care was discussed with the patient, surgeon and his mother.    Be Faust MD  Hospitalist Service  [facility]  Securely message with the Vocera Web Console (learn more here)  Text page via [employer] Paging/Directory      Clinically Significant Risk Factors Present on Admission                ______________________________________________________________________    Interval History   Patient tells me that he had a rough night.  Pain was difficult to manage.  Today seems to be a bit better.  Having some phantom pains.  Did not have any fevers or chills overnight.  He is eating and drinking well this morning.  Blood sugars are starting to rise.  Denies any nausea or vomiting.  No chest pain or shortness of breath.  No new issues.    Data reviewed today: I reviewed all medications, new labs and imaging results over the last 24 hours. I personally reviewed no images or EKG's today.    Physical Exam   Vital Signs: Temp: 98.5  F (36.9  C) Temp src: Tympanic BP: 126/61 Pulse: 56   Resp: 18 SpO2: 95 % O2 Device: None (Room air)    Weight: 183 lbs 3.24 oz  Constitutional: awake, alert, cooperative, no apparent distress, and appears stated age  Respiratory: Clear toascultation bilaterally.  No increased respiratory effort.   Cardiovascular: Regularrate and rhythm.  No murmurs rubs or gallops heard.   GI: Abdomen Soft, non-tender.  No masses, rebound or guarding.   Musculoskeletal: Right BKA.  No findings concerning for infection  Neuropsychiatric: General: normal, calm and normal eye contact  Orientation: oriented  to self, place, time and situation  Memory and insight: memory for past and recent events intact and thought process normal    Data   Recent Labs   Lab 08/29/21  0553 08/28/21  0547 08/27/21  0850 08/25/21  0530   WBC 9.2  --  8.6 7.7   HGB 9.5*  --  10.1* 9.1*   MCV 88  --  87 89    328 328 280    137 138 137   POTASSIUM 3.8 3.8 3.6 3.7   CHLORIDE 106 104 103 103   CO2 24 25 27 28   BUN 8 10 11 15   CR 0.75 0.80  0.80 0.81 1.07   ANIONGAP 8 8 8 6   AMAURY 8.2* 8.5* 8.7 8.4*   * 52* 94 99     No results found for this or any previous visit (from the past 24 hour(s)).

## 2021-08-29 NOTE — PLAN OF CARE
Patient is alert and oriented. VS 99.6-66-/65-97% RA. C/O phantom pain to right foot. Oxycodone 10mg po has worked well to decrease his pain. He also has scheduled Tylenol. Ice applied to right stump has also been helpful. Right stump ACE wrap is CDI. He is able to transfer to [facility] and in/out of bed with assist of 2, transfer belt and walker. Using the urinal. IV right upper forearm is saline locked, patent and CDI. Zosyn has been D/C'd. LS clear. BPs have improved without needing hydralazine prn. Left LE is paco and dry. He reports some numbness/tingling to LLE and +1 edema is present on that extremity.Eating a regular diet without any N/V. Blood glucose at lunch was 254 and Levemir was restarted. He also has Novolog Correction scale. Will continue to monitor.

## 2021-08-29 NOTE — PROGRESS NOTES
" PROGRESS NOTE    cc: POD# 1 s/p right BKA for dry gangrene     HPI: Pain controlled. No drainage.     EXAM:  Date 08/29/21 0700 - 08/30/21 0659   Shift 7980-8439 2965-0748 7490-6573 24 Hour Total   INTAKE   Shift Total(mL/kg)       OUTPUT   Urine 425   425   Shift Total(mL/kg) 425(5.11)   425(5.11)   Weight (kg) 83.1 83.1 83.1 83.1     BP (!) 146/65 (BP Location: Left arm)   Pulse 66   Temp 99.6  F (37.6  C) (Tympanic)   Resp 18   Ht 1.702 m (5' 7\")   Wt 83.1 kg (183 lb 3.2 oz)   SpO2 97%   BMI 28.69 kg/m       GENERAL: alert, NAD  CV: RRR, nomurmurs  RESPIRATORY: no dyspnea, clear bilat  EXTREMITY: Dressing C/D/I   SKIN: warm and dry, no jaundice norashes  NEURO: cranial nerves II-XII grossly intact, motor exam intact    LABS  Recent Labs   Lab Test 08/29/21  0553 08/28/21  0547 08/27/21  0850   WBC 9.2  --  8.6   RBC 3.29*  --  3.45*   HGB 9.5*  --  10.1*   HCT 29.0*  --  30.0*   MCV 88  --  87   MCH 28.9  --  29.3   MCHC 32.8  --  33.7    328 328       Recent Labs   Lab Test 08/29/21  0553 08/28/21  0547   POTASSIUM 3.8 3.8   CHLORIDE 106 104   BUN 8 10     No lab results found.    Invalid input(s): ALKPHOSPH    IMAGING    ASSESSMENT  66 year old male with dry gangrene POD # 1 s/p right BKA    PLAN  - PT and OT   - Ready for NH tomorrow   - PO pain meds    Follow-up in 2 weeks for suture removal     Yan Bear MD on 8/29/2021 at 4:23 PM     "

## 2021-08-30 VITALS
HEIGHT: 67 IN | WEIGHT: 183.2 LBS | HEART RATE: 62 BPM | OXYGEN SATURATION: 98 % | TEMPERATURE: 98.9 F | BODY MASS INDEX: 28.75 KG/M2 | RESPIRATION RATE: 18 BRPM | DIASTOLIC BLOOD PRESSURE: 69 MMHG | SYSTOLIC BLOOD PRESSURE: 153 MMHG

## 2021-08-30 LAB — HGB BLD-MCNC: 9.1 G/DL (ref 13.3–17.7)

## 2021-08-30 PROCEDURE — 99239 HOSP IP/OBS DSCHRG MGMT >30: CPT | Performed by: FAMILY MEDICINE

## 2021-08-30 PROCEDURE — 85018 HEMOGLOBIN: CPT | Performed by: FAMILY MEDICINE

## 2021-08-30 PROCEDURE — 250N000011 HC RX IP 250 OP 636: Performed by: SURGERY

## 2021-08-30 PROCEDURE — 250N000013 HC RX MED GY IP 250 OP 250 PS 637: Performed by: SURGERY

## 2021-08-30 PROCEDURE — 250N000013 HC RX MED GY IP 250 OP 250 PS 637: Performed by: FAMILY MEDICINE

## 2021-08-30 PROCEDURE — 36415 COLL VENOUS BLD VENIPUNCTURE: CPT | Performed by: FAMILY MEDICINE

## 2021-08-30 PROCEDURE — 250N000013 HC RX MED GY IP 250 OP 250 PS 637: Performed by: INTERNAL MEDICINE

## 2021-08-30 RX ORDER — OXYCODONE HYDROCHLORIDE 5 MG/1
5-10 TABLET ORAL EVERY 4 HOURS PRN
Qty: 30 TABLET | Refills: 0 | Status: SHIPPED | OUTPATIENT
Start: 2021-08-30 | End: 2021-09-21

## 2021-08-30 RX ORDER — INSULIN ASPART 100 [IU]/ML
5 INJECTION, SOLUTION INTRAVENOUS; SUBCUTANEOUS
Qty: 6 ML | Refills: 0 | Status: SHIPPED | OUTPATIENT
Start: 2021-08-30 | End: 2021-09-07

## 2021-08-30 RX ADMIN — POLYETHYLENE GLYCOL 3350 17 G: 17 POWDER, FOR SOLUTION ORAL at 09:23

## 2021-08-30 RX ADMIN — OXYCODONE HYDROCHLORIDE 10 MG: 5 TABLET ORAL at 03:05

## 2021-08-30 RX ADMIN — FAMOTIDINE 20 MG: 20 TABLET ORAL at 09:23

## 2021-08-30 RX ADMIN — ENOXAPARIN SODIUM 40 MG: 100 INJECTION SUBCUTANEOUS at 07:50

## 2021-08-30 RX ADMIN — TAMSULOSIN HYDROCHLORIDE 0.4 MG: 0.4 CAPSULE ORAL at 09:24

## 2021-08-30 RX ADMIN — ESCITALOPRAM OXALATE 20 MG: 10 TABLET ORAL at 09:23

## 2021-08-30 RX ADMIN — ACETAMINOPHEN 975 MG: 325 TABLET, FILM COATED ORAL at 05:43

## 2021-08-30 RX ADMIN — OXYCODONE HYDROCHLORIDE 10 MG: 5 TABLET ORAL at 11:43

## 2021-08-30 RX ADMIN — CLOPIDOGREL BISULFATE 75 MG: 75 TABLET, FILM COATED ORAL at 09:23

## 2021-08-30 RX ADMIN — OXYCODONE HYDROCHLORIDE 10 MG: 5 TABLET ORAL at 07:50

## 2021-08-30 RX ADMIN — DOCUSATE SODIUM 50 MG AND SENNOSIDES 8.6 MG 1 TABLET: 8.6; 5 TABLET, FILM COATED ORAL at 08:00

## 2021-08-30 RX ADMIN — LISINOPRIL 20 MG: 20 TABLET ORAL at 09:24

## 2021-08-30 ASSESSMENT — ACTIVITIES OF DAILY LIVING (ADL)
ADLS_ACUITY_SCORE: 17
ADLS_ACUITY_SCORE: 17
ADLS_ACUITY_SCORE: 19
ADLS_ACUITY_SCORE: 17

## 2021-08-30 NOTE — PROGRESS NOTES
:    I left message for Minal at [facility] for discharge update.  Anticipated  discharge today to [facility].  Will call [employer] and coordinate ride.    JERILYN Donald on 8/30/2021 at 7:47 AM    :    I called [facility] [employer] and they can be here at 1220 to transport.      JERILYN Donald on 8/30/2021 at 8:34 AM

## 2021-08-30 NOTE — PLAN OF CARE
"Pt A & O x 4, afebrile, B/p elevated scheduled HTN meds given. Crackles in LLL, O2 98% on RA denies any sob. Bowel sounds active, tolerating regular diet. Amputation site on right leg had scant sanguinous drainage, stitches intact, dressing changed by MD Bear. Pt rates pain 7/10, PRN Oxycodone given, will continue to monitor.            BP (!) 153/69   Pulse 62   Temp 98.9  F (37.2  C) (Tympanic)   Resp 18   Ht 1.702 m (5' 7\")   Wt 83.1 kg (183 lb 3.2 oz)   SpO2 98%   BMI 28.69 kg/m      "

## 2021-08-30 NOTE — PLAN OF CARE
Patient alert and oriented, vitals stable, reports of pain in R stump. PRN dilaudid and oxycodone administered per MAR with relief of pain. Lungs clear, bowels active. Stump dressing continues to be CDI. Will continue to monitor.  Selma Brennan RN on 8/30/2021 at 5:27 AM

## 2021-08-30 NOTE — DISCHARGE SUMMARY
[facility]  Hospitalist Discharge Summary      Date of Admission:  8/19/2021  Date of Discharge:  8/30/2021  Discharging Provider: eB Faust MD      Discharge Diagnoses   Diabetic Foot Ulcer with progression to Dry Gangrene  IDDM2    Follow-ups Needed After Discharge   Follow-up Appointments     Follow Up and recommended labs and tests      Follow up with Nursing home physician in the next week.  Follow up with surgery in two weeks.             Unresulted Labs Ordered in the Past 30 Days of this Admission     No orders found from 7/20/2021 to 8/20/2021.      These results will be followed up by NA    Discharge Disposition   Discharged to home  Condition at discharge: Stable      Hospital Course                                          Adam Duarte is a 66 year old male admitted on 8/19/2021. He presented with a diabetic foot ulcer.    Despite debridement, offloading and excellent glycemic control patient's wound did not heal.  I talked with vascular on Thursday 8/26 and reviewed imaging.  They suggested outpatient follow up as there were no beds available and patient was stable with minimal chance of success with vascular intervention.  We continued to aggressively control blood sugars and improved diet.  He remained off the foot.  Despite this by Friday afternoon he was developing dry gangrene and clearly was not going to improve without surgery by 8/28.      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  Pain was well controlled with oxycodone and patient had no postoperative complications.    He will need admission to skilled nursing facility for wound care, PT/OT and nursing cares for at least the next couple of weeks.  He lives with his mother but is thinking he will go live with his daughter temporarily as she would likely be able to help him more from a mobility standpoint etc.    Plavix was restarted yesterday and he has not had any issues with bleeding.    Blood  sugars were low and his insulin was reduced.  Prior to admission was prescribed about 90u daily.  In the hospital did well at about 55u.  Will be discharged on 40u levemir daily and 5u novolog with meals with sliding scale insulin as needed.    Summary of care of his foot infection noted below.  Diabetic ulcer of right foot (H)   -Currently S/P post right BKA postoperative day #2.  Assessment: present on admission, transfer from [facility]. ED provider obtained a xray there which shows no osteomyelitis.  MRI was done 08/23/2021 and showed significant soft tissue wound and edema but no obvious osteomyelitis.  Fever had resolved.  Wound however worsened substantially with evidence of dry gangrene on Friday and patient decided on BKA which was performed yesterday.    -Blood culture with no growth.  No tissue culture done.  -Stop zosyn.  Has been on days 1-3 and days 6-11.   -Stop vancomycin, has been on days 2-3 and 6-10.  -Was also on cipro and clindamycin days 3-6.                        Consultations This Hospital Stay   SURGERY GENERAL IP CONSULT  CARE MANAGEMENT / SOCIAL WORK IP CONSULT  PHARMACY TO DOSE VANCO  SOCIAL WORK IP CONSULT  SOCIAL WORK IP CONSULT  SOCIAL WORK IP CONSULT  PHARMACY TO DOSE VANCO  PHYSICAL THERAPY ADULT IP CONSULT  OCCUPATIONAL THERAPY ADULT IP CONSULT  OCCUPATIONAL THERAPY ADULT IP CONSULT  PHYSICAL THERAPY ADULT IP CONSULT    Code Status   Full Code    Time Spent on this Encounter   I, Be Faust MD, personally saw the patient today and spent less than or equal to 30 minutes discharging this patient.       Be Faust MD  [facility]  [address]  [place] [address]  Phone: [phone]  Fax: [phone]  ______________________________________________________________________    Physical Exam   Vital Signs: Temp: 98.9  F (37.2  C) Temp src: Tympanic BP: (!) 153/69 Pulse: 62   Resp: 18 SpO2: 98 % O2 Device: None (Room air)    Weight: 183  lbs 3.24 oz  Constitutional: awake, alert, cooperative, no apparent distress, and appears stated age  Respiratory: No increased work of breathing, good air exchange, clear to auscultation bilaterally, no crackles or wheezing  Cardiovascular: Regularrate and rhythm.  No murmurs rubs or gallops heard.   GI: Abdomen Soft, non-tender.  No masses, rebound or guarding.   Musculoskeletal: S/P BKA, no evidence of infection.  No synovitis in nonoperative joints.  Neuropsychiatric: General: normal, calm and normal eye contact  Orientation: oriented to self, place, time and situation  Memory and insight: memory for past and recent events intact and thought process normal       Primary Care Physician   Mee Williamson    Discharge Orders      General info for SNF    Length of Stay Estimate: Short Term Care: Estimated # of Days <30  Condition at Discharge: Improving  Level of care:skilled   Rehabilitation Potential: Good  Admission H&P remains valid and up-to-date: Yes  Recent Chemotherapy: N/A  Use Nursing Home Standing Orders: Yes     Mantoux instructions    Give two-step Mantoux (PPD) Per Facility Policy Yes     Follow Up and recommended labs and tests    Follow up with Nursing home physician in the next week.  Follow up with surgery in two weeks.     Reason for your hospital stay    You had infections on your foot that initially improved with antibiotics and debridement.  Unfortunately on 8/27 you developed dry gangrene as your blood flow to your foot was poor due to chronic diabetes.  You then required amputation on 8/28.  You did well with this and will require typical healing and treatment with PT/OT at skilled nursing facility.     Glucose monitor nursing POCT    Before meals and at bedtime     Wound care    Site:   right stump  Instructions:  xeroform gauze and ace wrap changed daily.     Activity - Up with assistive device     Activity - Up with nursing assistance     Fall precautions     Advance Diet as Tolerated     Follow this diet upon discharge: Low carbohydrate diet.       Significant Results and Procedures   Most Recent 3 CBC's:Recent Labs   Lab Test 08/30/21  0549 08/29/21  0553 08/28/21  0547 08/27/21  0850 08/25/21  0530   WBC  --  9.2  --  8.6 7.7   HGB 9.1* 9.5*  --  10.1* 9.1*   MCV  --  88  --  87 89   PLT  --  338 328 328 280     Most Recent 3 BMP's:Recent Labs   Lab Test 08/29/21  0553 08/28/21  0547 08/27/21  0850    137 138   POTASSIUM 3.8 3.8 3.6   CHLORIDE 106 104 103   CO2 24 25 27   BUN 8 10 11   CR 0.75 0.80  0.80 0.81   ANIONGAP 8 8 8   AMAURY 8.2* 8.5* 8.7   * 52* 94     Most Recent 6 Bacteria Isolates From Any Culture (See EPIC Reports for Culture Details):No lab results found.,   Results for orders placed or performed during the hospital encounter of 08/19/21   MR Foot Right w/o & w Contrast    Narrative     MR FOOT RIGHT W/O & W CONTRAST    HISTORY: 66 yearsMale admitted as an inpatient for antibiotic  treatment of large diabetic ulcer over the dorsum of the foot.    TECHNIQUE: Multisequence multiplanar MR imaging of the right foot was  performed with and without contrast.  COMPARISON: None    FINDINGS: A marker was placed over the dorsum of the foot. There is a  large ulcer/wound over the dorsum of the foot that appears to roughly  measure 4 cm cm in length and 4 cm transversely. Wound is dorsal to  the distal first and second metatarsals.    There is diffuse soft tissue edema of the foot. No organized fluid  collections are present. Small bubbles of air or gas are seen in the  soft tissues dorsal to the first metatarsal head and first  intermetatarsal space. The wound appears to expose the extensor  hallucis tendon.    No abnormal bone marrow signal is present. There is no evidence of  edema or enhancement.      Impression    IMPRESSION: Large dorsal ulcer/open wound. There is diffuse soft  tissue edema without evidence of abscess. There is no evidence of  osteomyelitis at this  time.    HAMMAD RUTLEDGE MD         SYSTEM ID:  [id number]   XR Chest Port 1 View    Narrative    Exam:  XR CHEST PORT 1 VIEW    HISTORY: prior hx of CABG or Stents, patient not sure. needs MRI.    COMPARISON:  None.    FINDINGS:     Heart size is normal. Prior median sternotomy.    No focal consolidation, effusion, or pneumothorax.      No acute osseous abnormality. No subdiaphragmatic free air.      Impression    IMPRESSION:      No acute cardiopulmonary process.      ALONDRA STOREY MD         SYSTEM ID:  [id number]   US RITA Doppler No Exercise 1-2 Levels Bilateral    Narrative    PROCEDURE: US RITA DOPPLER NO EXERCISE, 1-2 LEVELS, BILAT 8/24/2021  2:10 PM    HISTORY: diabetic foot ulcers. prior CAD, vasculopath    COMPARISONS: None.    TECHNIQUE: Bilateral ankle-brachial indices and toe brachial indices  were obtained.    FINDINGS: The arteries at the ankle are noncompressible. The toe  pressure on the left measured 31 which is borderline low. A pulse in  the right great toe could not be obtained.         Impression    IMPRESSION: Nondiagnostic evaluation. CT or MR angiogram of the lower  extremities is recommended    DAYANARA VASQUEZ MD         SYSTEM ID:  [id number]   CTA Abdomen Pelvis Bilat Leg Runoff w Contr    Narrative    PROCEDURE:  CTA ABDOMEN PELVIS BILAT LEG RUNOFF W CONTR    INDICATION: Claudication or leg ischemia; non-healing wound on right  foot    TECHNIQUE: CT angiographic images of the abdomen, pelvis and runoff  lower extremities was performed following the administration of 100 ml  Visipaque 320. Source, MIP and 3-D reconstructions were reviewed.    COMPARISON:  RITA ultrasound 8/24/2021    FINDINGS:    Scattered calcific atherosclerotic plaque is seen through out the  visualized arteries.    The heart is enlarged.    ABDOMEN:    Abdominal aorta:  No stenosis or aneurysmal dilatation.  Celiac artery:  Moderate origin narrowing.  Superior mesenteric artery: Mild origin narrowing.   Inferior  mesenteric artery:  Moderate origin stenosis.  Right renal artery:  No stenosis.  Left renal artery:  No stenosis.    PELVIS:    RIGHT  Common iliac:  Patent without stenosis.  Internal iliac:  Patent without stenosis.  External iliac:  Patent without stenosis.    LEFT  Common iliac:  Patent without stenosis.  Internal iliac:  Patent without stenosis.  External iliac:  Patent without stenosis.    LOWER EXTREMITIES:    RIGHT     Common femoral:  Patent without stenosis.  Superficial femoral:  Patent without stenosis.  Profunda femoris:  Patent without stenosis.    Popliteal artery:  Mild proximal stenosis.    Tandem severe stenoses are seen in the anterior tibial and peroneal  arteries. The posterior tibial artery remains relatively patent. The  runoff remains three-vessel.    LEFT    Common femoral:  Patent without stenosis.  Superficial femoral:  Patent without stenosis.  Profunda femoris:  Patent without stenosis.    Popliteal artery:  Patent without stenosis.    The posterior tibial artery is the dominant supply, demonstrating  multifocal mild and moderate stenoses. The anterior tibial artery is  occluded proximally. Multifocal severe stenoses are present in the  peroneal artery.      Impression    IMPRESSION:    No flow-limiting stenosis of the aorta through the popliteal arteries.      3 vessel runoff to the right foot complicated by high-grade narrowing  of the anterior tibial and peroneal arteries.     Two-vessel runoff to the left foot, with occlusion of the left  anterior tibial artery and multifocal peroneal artery high-grade  stenoses.    SANTIAGO BAEZ MD         SYSTEM ID:  [id number]       Discharge Medications   Current Discharge Medication List      START taking these medications    Details   oxyCODONE (ROXICODONE) 5 MG tablet Take 1-2 tablets (5-10 mg) by mouth every 4 hours as needed for moderate to severe pain  Qty: 30 tablet, Refills: 0    Associated Diagnoses: Status post below-knee  amputation of right lower extremity (H)         CONTINUE these medications which have CHANGED    Details   !! insulin aspart (NOVOLOG FLEXPEN) 100 UNIT/ML pen Inject 5 Units Subcutaneous 3 times daily (with meals)  Qty: 6 mL, Refills: 0    Associated Diagnoses: Type 2 diabetes mellitus with foot ulcer, with long-term current use of insulin (H)      !! insulin aspart (NOVOLOG PEN) 100 UNIT/ML pen Inject 1-7 Units Subcutaneous 3 times daily (before meals) Correction Scale - MEDIUM INSULIN RESISTANCE DOSING     Do Not give Correction Insulin if Pre-Meal BG less than 140.   For Pre-Meal  - 189 give 1 unit.   For Pre-Meal  - 239 give 2 units.   For Pre-Meal  - 289 give 3 units.   For Pre-Meal  - 339 give 4 units.   For Pre-Meal - 399 give 5 units.   For Pre-Meal -449 give 6 units  For Pre-Meal BG greater than or equal to 450 give 7 units.   To be given with prandial insulin, and based on pre-meal blood glucose.    Notify provider if glucose greater than or equal to 350 mg/dL after administration of correction dose. If given at mealtime, administer within 30 minutes of start of meal  Qty: 6 mL, Refills: 0    Associated Diagnoses: Type 2 diabetes mellitus with foot ulcer, with long-term current use of insulin (H)      !! insulin detemir (LEVEMIR PEN) 100 UNIT/ML pen Inject 25 Units Subcutaneous every morning (before breakfast)  Qty: 6 mL, Refills: 0    Associated Diagnoses: Type 2 diabetes mellitus with foot ulcer, with long-term current use of insulin (H)      !! insulin detemir (LEVEMIR PEN) 100 UNIT/ML pen Inject 15 Units Subcutaneous At Bedtime  Qty: 6 mL, Refills: 0    Associated Diagnoses: Type 2 diabetes mellitus with foot ulcer, with long-term current use of insulin (H)       !! - Potential duplicate medications found. Please discuss with provider.      CONTINUE these medications which have NOT CHANGED    Details   acetaminophen (TYLENOL) 325 MG tablet Take 325 mg by mouth 4  times daily as needed for mild pain      aspirin 81 MG EC tablet Take 81 mg by mouth daily       atorvastatin (LIPITOR) 20 MG tablet Take 20 mg by mouth daily      cyanocobalamin (VITAMIN B-12) 500 MCG tablet Take 1,000 mcg by mouth daily      escitalopram (LEXAPRO) 20 MG tablet Take 20 mg by mouth daily      magnesium oxide (MAG-OX) 400 MG tablet Take 400 mg by mouth 2 times daily      metFORMIN (GLUCOPHAGE-XR) 500 MG 24 hr tablet Take 500 mg by mouth daily (with dinner)      metoprolol succinate ER (TOPROL-XL) 25 MG 24 hr tablet Take 25 mg by mouth daily      Multiple Vitamin (MULTIVITAMIN ADULT PO) Take 1 tablet by mouth daily      omeprazole (PRILOSEC) 20 MG DR capsule Take 20 mg by mouth daily      tamsulosin (FLOMAX) 0.4 MG capsule Take 0.4 mg by mouth daily      vitamin B-Complex Take 1 tablet by mouth daily      clopidogrel (PLAVIX) 75 MG tablet Take 75 mg by mouth daily      nitroGLYcerin (NITROSTAT) 0.4 MG sublingual tablet Place 0.4 mg under the tongue every 5 minutes as needed for chest pain For chest pain place 1 tablet under the tongue every 5 minutes for 3 doses. If symptoms persist 5 minutes after 1st dose call 911.         STOP taking these medications       amoxicillin-clavulanate (AUGMENTIN) 875-125 MG tablet Comments:   Reason for Stopping:             Allergies   No Known Allergies

## 2021-08-30 NOTE — PHARMACY-ADMISSION MEDICATION HISTORY
Pharmacy:  Discharge Counseling and Medication Reconciliation    Adam Duarte  [address]   [facility] [address]  [phone] (home)   66 year old male  PCP: Mee Williamson    Allergies: Patient has no known allergies.    Discharge Counseling:    Pharmacist met with patient (and/or family) today to review the medication portion of the After Visit Summary (with an emphasis on NEW medications) and to address patient's questions/concerns.    Summary of Education: patient to be discharged to [facility], no education provided.    Materials Provided:  MedCounselor sheets printed from Clinical Pharmacology on: NA    Discharge Medication Reconciliation:    It has been determined that the patient has an adequate supply of medications available or which can be obtained from the patient's preferred pharmacy, which HE/SHE has confirmed as: [facility] [An updated medication list will be faxed to the patient's pharmacy.]    Thank you for the consult.    Sissy Marie RPH........August 30, 2021 10:26 AM

## 2021-08-30 NOTE — PHARMACY - DISCHARGE MEDICATION RECONCILIATION AND EDUCATION
[facility]  Part of [address] [address]    August 30, 2021    Dear Pharmacist,    Your customer, Adam Duarte, born on 1955, was recently discharged from [facility].  We have updated his medication list and want to alert you to the following:       Review of your medicines      CONTINUE these medicines which may have CHANGED, or have new prescriptions. If we are uncertain of the size of tablets/capsules you have at home, strength may be listed as something that might have changed.      Dose / Directions   * insulin aspart 100 UNIT/ML pen  Commonly known as: NovoLOG PEN  This may have changed: You were already taking a medication with the same name, and this prescription was added. Make sure you understand how and when to take each.      Dose: 1-7 Units  Inject 1-7 Units Subcutaneous 3 times daily (before meals) Correction Scale - MEDIUM INSULIN RESISTANCE DOSING     Do Not give Correction Insulin if Pre-Meal BG less than 140.   For Pre-Meal  - 189 give 1 unit.   For Pre-Meal  - 239 give 2 units.   For Pre-Meal  - 289 give 3 units.   For Pre-Meal  - 339 give 4 units.   For Pre-Meal - 399 give 5 units.   For Pre-Meal -449 give 6 units  For Pre-Meal BG greater than or equal to 450 give 7 units.   To be given with prandial insulin, and based on pre-meal blood glucose.    Notify provider if glucose greater than or equal to 350 mg/dL after administration of correction dose. If given at mealtime, administer within 30 minutes of start of meal  Quantity: 6 mL  Refills: 0     * NovoLOG FLEXPEN 100 UNIT/ML pen  This may have changed:     how much to take    when to take this    additional instructions  Generic drug: insulin aspart      Dose: 5 Units  Inject 5 Units Subcutaneous 3 times daily (with meals)  Quantity: 6 mL  Refills: 0     * insulin detemir 100 UNIT/ML pen  Commonly known as: LEVEMIR  PEN  This may have changed: You were already taking a medication with the same name, and this prescription was added. Make sure you understand how and when to take each.      Dose: 15 Units  Inject 15 Units Subcutaneous At Bedtime  Quantity: 6 mL  Refills: 0     * insulin detemir 100 UNIT/ML pen  Commonly known as: LEVEMIR PEN  This may have changed:     how much to take    when to take this    additional instructions      Dose: 25 Units  Start taking on: August 31, 2021  Inject 25 Units Subcutaneous every morning (before breakfast)  Quantity: 6 mL  Refills: 0         * This list has 4 medication(s) that are the same as other medications prescribed for you. Read the directions carefully, and ask your doctor or other care provider to review them with you.            CONTINUE these medicines which have NOT CHANGED      Dose / Directions   acetaminophen 325 MG tablet  Commonly known as: TYLENOL      Dose: 325 mg  Take 325 mg by mouth 4 times daily as needed for mild pain  Refills: 0     aspirin 81 MG EC tablet      Dose: 81 mg  Take 81 mg by mouth daily  Refills: 0     atorvastatin 20 MG tablet  Commonly known as: LIPITOR      Dose: 20 mg  Take 20 mg by mouth daily  Refills: 0     clopidogrel 75 MG tablet  Commonly known as: PLAVIX      Dose: 75 mg  Take 75 mg by mouth daily  Refills: 0     cyanocobalamin 500 MCG tablet  Commonly known as: VITAMIN B-12      Dose: 1,000 mcg  Take 1,000 mcg by mouth daily  Refills: 0     escitalopram 20 MG tablet  Commonly known as: LEXAPRO      Dose: 20 mg  Take 20 mg by mouth daily  Refills: 0     Flomax 0.4 MG capsule  Generic drug: tamsulosin      Dose: 0.4 mg  Take 0.4 mg by mouth daily  Refills: 0     magnesium oxide 400 MG tablet  Commonly known as: MAG-OX      Dose: 400 mg  Take 400 mg by mouth 2 times daily  Refills: 0     metFORMIN 500 MG 24 hr tablet  Commonly known as: GLUCOPHAGE-XR      Dose: 500 mg  Take 500 mg by mouth daily (with dinner)  Refills: 0     metoprolol  succinate ER 25 MG 24 hr tablet  Commonly known as: TOPROL-XL      Dose: 25 mg  Take 25 mg by mouth daily  Refills: 0     MULTIVITAMIN ADULT PO      Dose: 1 tablet  Take 1 tablet by mouth daily  Refills: 0     nitroGLYcerin 0.4 MG sublingual tablet  Commonly known as: NITROSTAT      Dose: 0.4 mg  Place 0.4 mg under the tongue every 5 minutes as needed for chest pain For chest pain place 1 tablet under the tongue every 5 minutes for 3 doses. If symptoms persist 5 minutes after 1st dose call 911.  Refills: 0     omeprazole 20 MG DR capsule  Commonly known as: priLOSEC      Dose: 20 mg  Take 20 mg by mouth daily  Refills: 0     vitamin B-Complex      Dose: 1 tablet  Take 1 tablet by mouth daily  Refills: 0        STOP taking    amoxicillin-clavulanate 875-125 MG tablet  Commonly known as: AUGMENTIN              Where to get your medicines      These medications were sent to [id number] - [place], [place] - [address]  [address] [facility] [address]    Phone: [phone]     insulin detemir 100 UNIT/ML pen    insulin detemir 100 UNIT/ML pen    NovoLOG FLEXPEN 100 UNIT/ML pen     Some of these will need a paper prescription and others can be bought over the counter. Ask your nurse if you have questions.    Bring a paper prescription for each of these medications    insulin aspart 100 UNIT/ML pen         We also reviewed Adam Duarte's allergy list and updated it as needed:  Allergies: Patient has no known allergies.    Thank you for continuing to care for Adam Duarte.  We look forward to working together with you in the future.    Sincerely,  Sissy Marie, [facility]

## 2021-08-30 NOTE — PROGRESS NOTES
[facility] DISCHARGE NOTE    Patient discharged to short-term care facility at 12:40 PM via wheel chair. Accompanied by other:[facility] and staff. Discharge instructions reviewed with patient, opportunity offered to ask questions. Prescriptions sent with patient to fill . All belongings sent with patient.    Carmine Weaver RN

## 2021-08-31 ENCOUNTER — PATIENT OUTREACH (OUTPATIENT)
Dept: CARE COORDINATION | Facility: CLINIC | Age: 66
End: 2021-08-31

## 2021-08-31 NOTE — PROGRESS NOTES
Transitional Care Management Phone Call  Patient discharged to [facility] for short term rehab. No TCM call required per policy.   Cate Moody RN  8/31/2021 8:55 AM

## 2021-09-03 LAB
PATH REPORT.COMMENTS IMP SPEC: NORMAL
PATH REPORT.FINAL DX SPEC: NORMAL
PHOTO IMAGE: NORMAL

## 2021-09-07 ENCOUNTER — OFFICE VISIT (OUTPATIENT)
Dept: SURGERY | Facility: OTHER | Age: 66
End: 2021-09-07
Attending: SURGERY
Payer: COMMERCIAL

## 2021-09-07 VITALS
HEART RATE: 59 BPM | OXYGEN SATURATION: 98 % | TEMPERATURE: 97.3 F | SYSTOLIC BLOOD PRESSURE: 144 MMHG | RESPIRATION RATE: 20 BRPM | DIASTOLIC BLOOD PRESSURE: 62 MMHG

## 2021-09-07 DIAGNOSIS — Z89.511 STATUS POST BELOW-KNEE AMPUTATION OF RIGHT LOWER EXTREMITY (H): Primary | ICD-10-CM

## 2021-09-07 PROCEDURE — 99024 POSTOP FOLLOW-UP VISIT: CPT | Performed by: SURGERY

## 2021-09-07 RX ORDER — INSULIN LISPRO 100 [IU]/ML
INJECTION, SOLUTION INTRAVENOUS; SUBCUTANEOUS
COMMUNITY
Start: 2021-08-30

## 2021-09-07 NOTE — NURSING NOTE
"Chief Complaint   Patient presents with     Surgical Followup     post op right BKA       Initial BP (!) 144/62 (BP Location: Right arm, Patient Position: Sitting, Cuff Size: Adult Regular)   Pulse 59   Temp 97.3  F (36.3  C) (Temporal)   Resp 20   SpO2 98%  Estimated body mass index is 28.69 kg/m  as calculated from the following:    Height as of 8/19/21: 1.702 m (5' 7\").    Weight as of 8/29/21: 83.1 kg (183 lb 3.2 oz).  Medication Reconciliation: Completed     Advanced Care Directive Reviewed    Claudette Hernandez LPN  "

## 2021-09-07 NOTE — PROGRESS NOTES
Patient presents for post surgical visit after Right BKA on 8/28/2021. Patient has done well. No problems with incision.    BP (!) 144/62 (BP Location: Right arm, Patient Position: Sitting, Cuff Size: Adult Regular)   Pulse 59   Temp 97.3  F (36.3  C) (Temporal)   Resp 20   SpO2 98%     General: NAD, pleasant and cooperative with exam and interview.  Ext: healing incision. No sign of infection. No pain with palpation. Sutures removed. Steris placed.   Psychiatry: awake, alert and oriented. Appropriate affect.    Assessment/Plan:  66 year old male with right BKA for dry gangrene of the foot.     - Doing well   - Okay to transition to just ACE/  sock    - Follow-up in 2 weeks      Yan Bear MD on 9/7/2021 at 3:44 PM

## 2021-09-15 ENCOUNTER — NURSING HOME VISIT (OUTPATIENT)
Dept: GERIATRICS | Facility: OTHER | Age: 66
End: 2021-09-15
Payer: COMMERCIAL

## 2021-09-15 VITALS
RESPIRATION RATE: 18 BRPM | WEIGHT: 167 LBS | BODY MASS INDEX: 26.16 KG/M2 | TEMPERATURE: 97.2 F | SYSTOLIC BLOOD PRESSURE: 164 MMHG | HEART RATE: 57 BPM | DIASTOLIC BLOOD PRESSURE: 72 MMHG | OXYGEN SATURATION: 99 %

## 2021-09-15 DIAGNOSIS — I10 ESSENTIAL HYPERTENSION: ICD-10-CM

## 2021-09-15 DIAGNOSIS — K21.9 GASTROESOPHAGEAL REFLUX DISEASE WITHOUT ESOPHAGITIS: ICD-10-CM

## 2021-09-15 DIAGNOSIS — Z79.4 TYPE 2 DIABETES MELLITUS WITH FOOT ULCER, WITH LONG-TERM CURRENT USE OF INSULIN (H): Primary | ICD-10-CM

## 2021-09-15 DIAGNOSIS — Z95.1 S/P CABG (CORONARY ARTERY BYPASS GRAFT): ICD-10-CM

## 2021-09-15 DIAGNOSIS — Z89.511 STATUS POST BELOW-KNEE AMPUTATION OF RIGHT LOWER EXTREMITY (H): ICD-10-CM

## 2021-09-15 DIAGNOSIS — I25.10 ATHEROSCLEROSIS OF NATIVE CORONARY ARTERY OF NATIVE HEART WITHOUT ANGINA PECTORIS: ICD-10-CM

## 2021-09-15 DIAGNOSIS — L97.509 TYPE 2 DIABETES MELLITUS WITH FOOT ULCER, WITH LONG-TERM CURRENT USE OF INSULIN (H): Primary | ICD-10-CM

## 2021-09-15 DIAGNOSIS — E11.621 TYPE 2 DIABETES MELLITUS WITH FOOT ULCER, WITH LONG-TERM CURRENT USE OF INSULIN (H): Primary | ICD-10-CM

## 2021-09-15 DIAGNOSIS — D64.9 POSTOPERATIVE ANEMIA: ICD-10-CM

## 2021-09-15 PROCEDURE — 99305 1ST NF CARE MODERATE MDM 35: CPT | Performed by: NURSE PRACTITIONER

## 2021-09-15 ASSESSMENT — ENCOUNTER SYMPTOMS
FEVER: 0
POLYPHAGIA: 0
HEMATOCHEZIA: 0
COUGH: 0
POLYDIPSIA: 0
DYSPHORIC MOOD: 0
CONFUSION: 0
ABDOMINAL PAIN: 0
NAUSEA: 0
DYSURIA: 0
LIGHT-HEADEDNESS: 0
WOUND: 0
PALPITATIONS: 0
DIZZINESS: 0
SHORTNESS OF BREATH: 0
WEAKNESS: 1
DIFFICULTY URINATING: 0
CHILLS: 0

## 2021-09-15 NOTE — PROGRESS NOTES
Subjective:  She was seen today for follow-up hospitalization.  He was hospitalized at [facility] from August 19 through August 30, 2021 with diabetic foot ulcer and dry gangrene.  He underwent right BKA without complication.  His pain was well controlled with oxycodone.  His blood sugars were running low while hospitalized and his insulin was reduced.  He has history of hypertension with no changes to blood pressure meds while hospitalized.  He has coronary atherosclerosis with history of CABG and is on Plavix.  He is also on aspirin.  He is followed by Dr. Darrius Bear with last appointment a week ago.  He was discharged to skilled nursing facility for PT, OT and nursing care.  He did not need any follow-up labs.  Since being at [facility] his blood sugars are well controlled and ranged from  and receives both basal and bolus insulin.  He has a sliding scale for coverage.  His blood pressures however have been running high from 145//81.  He currently takes Toprol-XL 25 mg daily.  He is on omeprazole for GERD with no breakthrough symptoms.      Patient Active Problem List   Diagnosis     Diabetic ulcer of right foot (H)     Coronary atherosclerosis     DMII (diabetes mellitus, type 2) (H)     Essential hypertension     GERD (gastroesophageal reflux disease)     S/P CABG (coronary artery bypass graft)     Status post below-knee amputation of right lower extremity (H)     Past Medical History:   Diagnosis Date     Coronary atherosclerosis 12/30/2014     Diabetic ulcer of left foot (H) 8/19/2021     DMII (diabetes mellitus, type 2) (H) 10/18/2013     Essential hypertension 10/18/2013    Formatting of this note might be different from the original. IMO Update     GERD (gastroesophageal reflux disease) 9/11/2013     S/P CABG (coronary artery bypass graft) 4/28/2015     Past Surgical History:   Procedure Laterality Date     AMPUTATE LEG BELOW KNEE Right 8/28/2021    Procedure: AMPUTATION, BELOW KNEE;  Surgeon:  Yan Bear MD;  Location: [facility] OR     AS CABG, ARTERIAL, THREE       Social History     Socioeconomic History     Marital status:      Spouse name: Not on file     Number of children: Not on file     Years of education: Not on file     Highest education level: Not on file   Occupational History     Not on file   Tobacco Use     Smoking status: Former Smoker     Smokeless tobacco: Never Used   Substance and Sexual Activity     Alcohol use: Not Currently     Drug use: Not Currently     Sexual activity: Not Currently   Other Topics Concern     Not on file   Social History Narrative     Not on file     Social Determinants of Health     Financial Resource Strain:      Difficulty of Paying Living Expenses:    Food Insecurity:      Worried About Running Out of Food in the Last Year:      Ran Out of Food in the Last Year:    Transportation Needs:      Lack of Transportation (Medical):      Lack of Transportation (Non-Medical):    Physical Activity:      Days of Exercise per Week:      Minutes of Exercise per Session:    Stress:      Feeling of Stress :    Social Connections:      Frequency of Communication with Friends and Family:      Frequency of Social Gatherings with Friends and Family:      Attends Pent[employer] Services:      Active Member of Clubs or Organizations:      Attends Club or Organization Meetings:      Marital Status:    Intimate Partner Violence:      Fear of Current or Ex-Partner:      Emotionally Abused:      Physically Abused:      Sexually Abused:      Family History   Problem Relation Age of Onset     Coronary Artery Disease Father      Patient has no known allergies.    Skilled Nursing Facility Medication Record reviewed    End of Life Planning:   Full Code    Review of Systems:  Review of Systems   Constitutional: Negative for chills and fever.   Respiratory: Negative for cough and shortness of breath.    Cardiovascular: Negative for chest pain and palpitations.   Gastrointestinal: Negative  for abdominal pain, hematochezia and nausea.   Endocrine: Negative for polydipsia and polyphagia.   Genitourinary: Negative for difficulty urinating and dysuria.   Musculoskeletal: Positive for gait problem.   Skin: Negative for wound.   Neurological: Positive for weakness. Negative for dizziness and light-headedness.   Psychiatric/Behavioral: Negative for confusion and dysphoric mood.       Objective:   BP (!) 164/72   Pulse 57   Temp 97.2  F (36.2  C)   Resp 18   Wt 75.8 kg (167 lb)   SpO2 99%   BMI 26.16 kg/m    Physical Exam  Pleasant gentleman no acute distress.  Affect normal.  Alert and oriented x4.  Skin color pink.  Mucous membranes moist.  Neck supple without adenopathy.  Lung fields clear to auscultation.  Cardiovascular regular rate and rhythm.  Abdomen soft and without masses, tenderness and organomegaly.  No suprapubic tenderness.  Left lower extremity without edema.  Right BKA stump without edema and erythema.      8/22/21 Hgb A1C 11.1  8/30/21 HGB 9.1, 8/13/21 HGB 12.6  8/29/21 BMP unremarkable    Assessment:    ICD-10-CM    1. Type 2 diabetes mellitus with foot ulcer, with long-term current use of insulin (H)  E11.621     L97.509     Z79.4    2. Status post below-knee amputation of right lower extremity (H)  Z89.511    3. Essential hypertension  I10    4. Atherosclerosis of native coronary artery of native heart without angina pectoris  I25.10    5. S/P CABG (coronary artery bypass graft)  Z95.1    6. Gastroesophageal reflux disease without esophagitis  K21.9    7. Postoperative anemia  D64.9        Plan:   By reviewing blood sugars on average these show improvement in control.  No change to insulin orders at this time.  Start lisinopril 5 mg daily for hypertension.  Check basic metabolic panel in 2-3 weeks.  Follow-up postop anemia by checking hemoglobin in 2-3 weeks.  Continue to follow with general surgeon.  Continue to work with PT and OT.    EDILBERTO Rodriguez   9/15/2021  2:33  PM

## 2021-09-20 DIAGNOSIS — Z89.511 STATUS POST BELOW-KNEE AMPUTATION OF RIGHT LOWER EXTREMITY (H): ICD-10-CM

## 2021-09-21 ENCOUNTER — OFFICE VISIT (OUTPATIENT)
Dept: SURGERY | Facility: OTHER | Age: 66
End: 2021-09-21
Attending: SURGERY
Payer: COMMERCIAL

## 2021-09-21 VITALS
HEART RATE: 64 BPM | SYSTOLIC BLOOD PRESSURE: 138 MMHG | OXYGEN SATURATION: 98 % | WEIGHT: 163.2 LBS | TEMPERATURE: 96.8 F | RESPIRATION RATE: 16 BRPM | DIASTOLIC BLOOD PRESSURE: 66 MMHG | BODY MASS INDEX: 25.56 KG/M2

## 2021-09-21 DIAGNOSIS — Z89.511 STATUS POST BELOW-KNEE AMPUTATION OF RIGHT LOWER EXTREMITY (H): ICD-10-CM

## 2021-09-21 PROCEDURE — G0463 HOSPITAL OUTPT CLINIC VISIT: HCPCS

## 2021-09-21 PROCEDURE — 99024 POSTOP FOLLOW-UP VISIT: CPT | Performed by: SURGERY

## 2021-09-21 RX ORDER — B-COMPLEX WITH VITAMIN C
1 TABLET ORAL DAILY
COMMUNITY
Start: 2021-09-15

## 2021-09-21 RX ORDER — OXYCODONE HYDROCHLORIDE 5 MG/1
TABLET ORAL
Qty: 30 TABLET | Refills: 0 | OUTPATIENT
Start: 2021-09-21

## 2021-09-21 RX ORDER — LANOLIN ALCOHOL/MO/W.PET/CERES
CREAM (GRAM) TOPICAL
COMMUNITY
Start: 2021-09-13

## 2021-09-21 RX ORDER — LISINOPRIL 5 MG/1
TABLET ORAL
COMMUNITY
Start: 2021-09-16

## 2021-09-21 RX ORDER — OXYCODONE HYDROCHLORIDE 5 MG/1
5-10 TABLET ORAL EVERY 4 HOURS PRN
Qty: 30 TABLET | Refills: 0 | Status: SHIPPED | OUTPATIENT
Start: 2021-09-21 | End: 2021-11-03

## 2021-09-21 ASSESSMENT — PAIN SCALES - GENERAL: PAINLEVEL: MODERATE PAIN (4)

## 2021-09-21 NOTE — PROGRESS NOTES
Patient presents for post surgical visit after Right BKA on 8/28/2021. Patient has done well. No problems with incision.    /66 (BP Location: Right arm, Patient Position: Sitting, Cuff Size: Adult Regular)   Pulse 64   Temp 96.8  F (36  C) (Temporal)   Resp 16   Wt 74 kg (163 lb 3.2 oz)   SpO2 98%   BMI 25.56 kg/m      General: NAD, pleasant and cooperative with exam and interview.  Ext: healing incision. No sign of infection. No pain with palpation. Band-Aid placed  Psychiatry: awake, alert and oriented. Appropriate affect.    Assessment/Plan:  66 year old male with right BKA for dry gangrene of the foot.     - Doing well   - bacitracin and Band-Aid to mid portion daily.     - Follow-up in 4 weeks      Yan Bear MD on 9/21/2021 at 10:50 AM

## 2021-09-21 NOTE — NURSING NOTE
"Chief Complaint   Patient presents with     RECHECK     wound care       Initial /66 (BP Location: Right arm, Patient Position: Sitting, Cuff Size: Adult Regular)   Pulse 64   Temp 96.8  F (36  C) (Temporal)   Resp 16   Wt 74 kg (163 lb 3.2 oz)   SpO2 98%   BMI 25.56 kg/m   Estimated body mass index is 25.56 kg/m  as calculated from the following:    Height as of 8/19/21: 1.702 m (5' 7\").    Weight as of this encounter: 74 kg (163 lb 3.2 oz).  Medication Reconciliation: Completed     Advanced Care Directive Reviewed    Claudette Hernandez LPN  "

## 2021-09-21 NOTE — TELEPHONE ENCOUNTER
Addressed today by Dr. Yan eBar. Unable to complete prescription refill per RN Medication Refill Policy.................... Kristie Domingo RN ....................  9/21/2021   3:41 PM

## 2021-09-22 ENCOUNTER — NURSING HOME VISIT (OUTPATIENT)
Dept: GERIATRICS | Facility: OTHER | Age: 66
End: 2021-09-22
Payer: COMMERCIAL

## 2021-09-22 ENCOUNTER — APPOINTMENT (OUTPATIENT)
Dept: GERIATRICS | Facility: OTHER | Age: 66
End: 2021-09-22
Attending: NURSE PRACTITIONER
Payer: COMMERCIAL

## 2021-09-22 VITALS
BODY MASS INDEX: 25.69 KG/M2 | RESPIRATION RATE: 18 BRPM | WEIGHT: 164 LBS | TEMPERATURE: 97.5 F | HEART RATE: 71 BPM | OXYGEN SATURATION: 96 % | SYSTOLIC BLOOD PRESSURE: 148 MMHG | DIASTOLIC BLOOD PRESSURE: 70 MMHG

## 2021-09-22 DIAGNOSIS — I10 ESSENTIAL HYPERTENSION: ICD-10-CM

## 2021-09-22 DIAGNOSIS — D64.9 POSTOPERATIVE ANEMIA: ICD-10-CM

## 2021-09-22 DIAGNOSIS — E11.621 TYPE 2 DIABETES MELLITUS WITH FOOT ULCER, WITH LONG-TERM CURRENT USE OF INSULIN (H): ICD-10-CM

## 2021-09-22 DIAGNOSIS — Z79.4 TYPE 2 DIABETES MELLITUS WITH FOOT ULCER, WITH LONG-TERM CURRENT USE OF INSULIN (H): ICD-10-CM

## 2021-09-22 DIAGNOSIS — Z89.511 STATUS POST BELOW-KNEE AMPUTATION OF RIGHT LOWER EXTREMITY (H): Primary | ICD-10-CM

## 2021-09-22 DIAGNOSIS — I25.10 ATHEROSCLEROSIS OF NATIVE CORONARY ARTERY OF NATIVE HEART WITHOUT ANGINA PECTORIS: ICD-10-CM

## 2021-09-22 DIAGNOSIS — L97.509 TYPE 2 DIABETES MELLITUS WITH FOOT ULCER, WITH LONG-TERM CURRENT USE OF INSULIN (H): ICD-10-CM

## 2021-09-22 PROCEDURE — 99315 NF DSCHRG MGMT 30 MIN/LESS: CPT | Performed by: NURSE PRACTITIONER

## 2021-09-22 ASSESSMENT — ENCOUNTER SYMPTOMS
DYSPHORIC MOOD: 0
POLYDIPSIA: 0
CONFUSION: 0
POLYPHAGIA: 0
ARTHRALGIAS: 0
DIFFICULTY URINATING: 0
PALPITATIONS: 0
NAUSEA: 0
UNEXPECTED WEIGHT CHANGE: 0
NERVOUS/ANXIOUS: 0
HEARTBURN: 0
ABDOMINAL PAIN: 0
SHORTNESS OF BREATH: 0
CHEST TIGHTNESS: 0
DIZZINESS: 0
MYALGIAS: 0
DYSURIA: 0
WOUND: 1
ANAL BLEEDING: 0
FATIGUE: 0
HEMATOCHEZIA: 0
WEAKNESS: 1

## 2021-09-22 NOTE — PROGRESS NOTES
Subjective:  Patient is seen today requesting discharge back home.  He lives with his mom in [place].  He has been at skilled nursing facility for rehab after right BKA.  He was seen by surgeon yesterday.  He is able to do his own dressing change of bacitracin and Band-Aid daily.  He is started working with orthotist for planning for prosthesis.  He needs home care PT to help with strengthening and prosthesis.  While at skilled nursing facility he was started on lisinopril 5 mg daily for hypertension.  His blood pressure are trending downward.  He is due to have follow-up labs for hypertension and postop anemia in 2 weeks.  He has type 2 diabetes and sugars have been ranging from 105-188.  For pain management he has been using oxycodone 5-10 mg on average every 2 days.    Patient Active Problem List   Diagnosis     Diabetic ulcer of right foot (H)     Coronary atherosclerosis     DMII (diabetes mellitus, type 2) (H)     Essential hypertension     GERD (gastroesophageal reflux disease)     S/P CABG (coronary artery bypass graft)     Status post below-knee amputation of right lower extremity (H)     Past Medical History:   Diagnosis Date     Coronary atherosclerosis 12/30/2014     Diabetic ulcer of left foot (H) 8/19/2021     DMII (diabetes mellitus, type 2) (H) 10/18/2013     Essential hypertension 10/18/2013    Formatting of this note might be different from the original. IMO Update     GERD (gastroesophageal reflux disease) 9/11/2013     S/P CABG (coronary artery bypass graft) 4/28/2015     Past Surgical History:   Procedure Laterality Date     AMPUTATE LEG BELOW KNEE Right 8/28/2021    Procedure: AMPUTATION, BELOW KNEE;  Surgeon: Yan Bear MD;  Location:  OR     AS CABG, ARTERIAL, THREE       Social History     Socioeconomic History     Marital status:      Spouse name: Not on file     Number of children: Not on file     Years of education: Not on file     Highest education level: Not on file    Occupational History     Not on file   Tobacco Use     Smoking status: Former Smoker     Smokeless tobacco: Never Used   Substance and Sexual Activity     Alcohol use: Not Currently     Drug use: Not Currently     Sexual activity: Not Currently   Other Topics Concern     Not on file   Social History Narrative     Not on file     Social Determinants of Health     Financial Resource Strain:      Difficulty of Paying Living Expenses:    Food Insecurity:      Worried About Running Out of Food in the Last Year:      Ran Out of Food in the Last Year:    Transportation Needs:      Lack of Transportation (Medical):      Lack of Transportation (Non-Medical):    Physical Activity:      Days of Exercise per Week:      Minutes of Exercise per Session:    Stress:      Feeling of Stress :    Social Connections:      Frequency of Communication with Friends and Family:      Frequency of Social Gatherings with Friends and Family:      Attends Yazidi Services:      Active Member of Clubs or Organizations:      Attends Club or Organization Meetings:      Marital Status:    Intimate Partner Violence:      Fear of Current or Ex-Partner:      Emotionally Abused:      Physically Abused:      Sexually Abused:      Family History   Problem Relation Age of Onset     Coronary Artery Disease Father      Patient has no known allergies.    Skilled Nursing Facility Medication Record reviewed    End of Life Planning:   Full code    Review of Systems:  Review of Systems   Constitutional: Negative for fatigue and unexpected weight change.   Respiratory: Negative for chest tightness and shortness of breath.    Cardiovascular: Negative for chest pain, palpitations and peripheral edema.   Gastrointestinal: Negative for abdominal pain, anal bleeding, heartburn, hematochezia and nausea.   Endocrine: Negative for polydipsia, polyphagia and polyuria.   Genitourinary: Negative for difficulty urinating and dysuria.   Musculoskeletal: Negative for  arthralgias and myalgias.   Skin: Positive for wound.   Neurological: Positive for weakness. Negative for dizziness and syncope.   Psychiatric/Behavioral: Negative for confusion and dysphoric mood. The patient is not nervous/anxious.        Objective:   BP (!) 148/70   Pulse 71   Temp 97.5  F (36.4  C)   Resp 18   Wt 74.4 kg (164 lb)   SpO2 96%   BMI 25.69 kg/m    Physical Exam  Pleasant gentleman no acute distress.  Affect normal.  Alert and oriented x4.  Smiling and in good spirits.  Skin color pink.  Mucous membranes moist.  Lung fields clear to auscultation.  Cardiovascular regular.  Abdomen is without tenderness.  Left lower extremity without edema.  Right BKA without edema.    Assessment:    ICD-10-CM    1. Status post below-knee amputation of right lower extremity (H)  Z89.511    2. Type 2 diabetes mellitus with foot ulcer, with long-term current use of insulin (H)  E11.621     L97.509     Z79.4    3. Essential hypertension  I10    4. Atherosclerosis of native coronary artery of native heart without angina pectoris  I25.10    5. Postoperative anemia  D64.9        Plan:   He will discharge home on current medications.  He has a wheelchair and a handicap accessible home.  Remaining supply of oxycodone will be provided to him.  Further refills through his surgeon.  Orders placed for home care PT.  He is able to do his own dressing change daily.  He needs to follow-up with his surgeon in 4 weeks.  He needs to schedule follow-up appointment with his PCP for hypertension and postop anemia and will need lab work of CBC and basic metabolic panel in 2 weeks.    EDILBERTO Rodriguez   9/22/2021  3:28 PM

## 2021-10-01 ENCOUNTER — TELEPHONE (OUTPATIENT)
Dept: SURGERY | Facility: OTHER | Age: 66
End: 2021-10-01

## 2021-10-01 NOTE — TELEPHONE ENCOUNTER
JARETT Bear-pt asking what oinment should he be using for his amputation. Thank you.  Delia Alcaraz

## 2021-10-05 NOTE — TELEPHONE ENCOUNTER
Okay to uses bacitracin if there are any open spots, otherwise any lotion will do. Yan Bear MD on 10/5/2021 at 9:44 AM

## 2021-10-13 ENCOUNTER — NURSE TRIAGE (OUTPATIENT)
Dept: NURSING | Facility: CLINIC | Age: 66
End: 2021-10-13

## 2021-10-13 NOTE — TELEPHONE ENCOUNTER
Gretchen with [employer] calling back to let patient's provider know that the Wound Vac order they received today 10-13-21 will be released and will be sent today.    Anaya Swenson RN  [facility]

## 2021-11-03 ENCOUNTER — OFFICE VISIT (OUTPATIENT)
Dept: SURGERY | Facility: OTHER | Age: 66
End: 2021-11-03
Attending: SURGERY
Payer: COMMERCIAL

## 2021-11-03 VITALS
BODY MASS INDEX: 26.63 KG/M2 | HEART RATE: 72 BPM | WEIGHT: 170 LBS | OXYGEN SATURATION: 97 % | TEMPERATURE: 97 F | SYSTOLIC BLOOD PRESSURE: 136 MMHG | DIASTOLIC BLOOD PRESSURE: 84 MMHG

## 2021-11-03 DIAGNOSIS — L08.9 LEFT FOOT INFECTION: Primary | ICD-10-CM

## 2021-11-03 DIAGNOSIS — S88.111A BELOW-KNEE AMPUTATION OF RIGHT LOWER EXTREMITY (H): ICD-10-CM

## 2021-11-03 PROCEDURE — G0463 HOSPITAL OUTPT CLINIC VISIT: HCPCS | Mod: 25

## 2021-11-03 PROCEDURE — 97605 NEG PRS WND THER DME<=50SQCM: CPT | Mod: 78 | Performed by: SURGERY

## 2021-11-03 PROCEDURE — G0463 HOSPITAL OUTPT CLINIC VISIT: HCPCS

## 2021-11-03 PROCEDURE — 99213 OFFICE O/P EST LOW 20 MIN: CPT | Mod: 25 | Performed by: SURGERY

## 2021-11-03 RX ORDER — CIPROFLOXACIN 500 MG/1
500 TABLET, FILM COATED ORAL 2 TIMES DAILY
Qty: 28 TABLET | Refills: 0 | Status: SHIPPED | OUTPATIENT
Start: 2021-11-03 | End: 2021-11-17

## 2021-11-03 ASSESSMENT — PAIN SCALES - GENERAL: PAINLEVEL: NO PAIN (0)

## 2021-11-03 NOTE — NURSING NOTE
"Chief Complaint   Patient presents with     WOUND CARE     wound vac       Initial /84 (BP Location: Right arm, Patient Position: Sitting, Cuff Size: Adult Large)   Pulse 72   Temp 97  F (36.1  C) (Tympanic)   Wt 77.1 kg (170 lb)   SpO2 97%   BMI 26.63 kg/m   Estimated body mass index is 26.63 kg/m  as calculated from the following:    Height as of 8/19/21: 1.702 m (5' 7\").    Weight as of this encounter: 77.1 kg (170 lb).  Medication Reconciliation: complete    Juanita Daniel LPN  "

## 2021-11-03 NOTE — PROGRESS NOTES
S:     Adam presents for follow-up following a number of ER visits.  One of his ER visits was for wound discharge with his wet-to-dry dressing.  The other one was for new infection of the left foot.  He is no showed several appointments.  He is able to make it today with the assistance of his daughter for a ride.  His wound VAC has been approved.  Has not been placed yet due to his no-show status.  In the interim he now has a ulcer between the left second and third toes.    /84 (BP Location: Right arm, Patient Position: Sitting, Cuff Size: Adult Large)   Pulse 72   Temp 97  F (36.1  C) (Tympanic)   Wt 77.1 kg (170 lb)   SpO2 97%   BMI 26.63 kg/m      General: NAD, pleasant and cooperative with exam and interview.  Extremity: Right BKA stump has dehiscence that measures 14 cm x 4 cm x 1.0 cm.  There is granulation tissue in the wound.  There is a mild amount of exudate.  The left foot shows weeping superficial ulcer between the second and third toes extending up to the metatarsal surface.  He believes this is caused by wearing his tight moccasins.  Psychiatry: awake, alert and oriented. Appropriate affect.    Assessment/Plan:  66-year-old diabetic male with history of right BKA status post wound dehiscence after falling on his stump.  Now with left foot wound as well.    We'll add ciprofloxacin for presumed diabetic foot ulcer with typical dionisio.  He has a few days of clindamycin left.    Wound VAC applied to the right stump.  This we changed twice weekly.  Travel is difficult for him.  We'll try to get home health via [facility] and [facility] to do this with their visits.  Otherwise he could get this wound VAC changed in [place] which is closer for him.  Will be due for VAC change coming Monday, 11/8/2021    Yan Bear MD on 11/3/2021 at 1:14 PM

## 2021-11-08 ENCOUNTER — HOSPITAL ENCOUNTER (EMERGENCY)
Facility: OTHER | Age: 66
Discharge: HOME | End: 2021-11-08
Attending: EMERGENCY MEDICINE | Admitting: EMERGENCY MEDICINE
Payer: COMMERCIAL

## 2021-11-08 ENCOUNTER — TELEPHONE (OUTPATIENT)
Dept: FAMILY MEDICINE | Facility: OTHER | Age: 66
End: 2021-11-08
Payer: COMMERCIAL

## 2021-11-08 VITALS
OXYGEN SATURATION: 99 % | RESPIRATION RATE: 20 BRPM | HEART RATE: 67 BPM | BODY MASS INDEX: 26.63 KG/M2 | WEIGHT: 170 LBS | SYSTOLIC BLOOD PRESSURE: 171 MMHG | TEMPERATURE: 97.6 F | DIASTOLIC BLOOD PRESSURE: 74 MMHG

## 2021-11-08 DIAGNOSIS — Z51.89 VISIT FOR WOUND CHECK: ICD-10-CM

## 2021-11-08 PROCEDURE — 97606 NEG PRS WND THER DME>50 SQCM: CPT | Performed by: EMERGENCY MEDICINE

## 2021-11-08 PROCEDURE — 99282 EMERGENCY DEPT VISIT SF MDM: CPT | Mod: 25 | Performed by: EMERGENCY MEDICINE

## 2021-11-08 PROCEDURE — 97606 NEG PRS WND THER DME>50 SQCM: CPT | Mod: RT | Performed by: EMERGENCY MEDICINE

## 2021-11-08 NOTE — DISCHARGE INSTRUCTIONS
You need the wound vacuum changed every Monday and Thursday. If Home care can not do this then you must come to Dr Bear's clinic

## 2021-11-08 NOTE — TELEPHONE ENCOUNTER
Patient is having issues with his wound vac.  Attempted to call was told that he is on his way to [facility].  Juanita Daniel LPN..........11/8/2021  3:58 PM

## 2021-11-08 NOTE — ED TRIAGE NOTES
Patient comes to ER from home because he needs his wound vac cannister changed out. He states he got the wound vac placed 4 days ago by Dr. Bear.

## 2021-11-08 NOTE — TELEPHONE ENCOUNTER
Please call soon.  The patient has a machine that is going off?  He does not know how to empty this?   The machine is full.          May Wadsworth on 11/8/2021 at 2:15 PM

## 2021-11-08 NOTE — ED PROVIDER NOTES
[facility]  Emergency Department Visit Note    Chief Complaint   Patient presents with     Wound vac cannister change       History of Present Illness     HPI:  Adam Duarte is a 66 year old male presenting with need for wound vac change. Thisvac was placed 4 days ago on a right BKA wound dehiscence  The patient presents to the emergency department today because he is concerned about getting it changed. No fever, chills, chest pain, abdominal pain, nausea, vomiting, shortness of breath. Has been taking all prescriptions as prescribed.     Medications:  Prior to Admission medications    Medication Sig Last Dose Taking? Auth Provider   acetaminophen (TYLENOL) 325 MG tablet Take 325 mg by mouth 4 times daily as needed for mild pain   Unknown, Entered By History   aspirin 81 MG EC tablet Take 81 mg by mouth daily    Unknown, Entered By History   atorvastatin (LIPITOR) 20 MG tablet Take 20 mg by mouth daily   Unknown, Entered By History   ciprofloxacin (CIPRO) 500 MG tablet Take 1 tablet (500 mg) by mouth 2 times daily for 14 days   Yan Bear MD   clopidogrel (PLAVIX) 75 MG tablet Take 75 mg by mouth daily   Unknown, Entered By History   cyanocobalamin (VITAMIN B-12) 1000 MCG tablet TAKE 1 TABLET BY MOUTH DAILY REORDER   Reported, Patient   cyanocobalamin (VITAMIN B-12) 500 MCG tablet Take 1,000 mcg by mouth daily   Unknown, Entered By History   escitalopram (LEXAPRO) 20 MG tablet Take 20 mg by mouth daily   Unknown, Entered By History   insulin detemir (LEVEMIR PEN) 100 UNIT/ML pen Inject 25 Units Subcutaneous every morning (before breakfast)   Be Faust MD   insulin detemir (LEVEMIR PEN) 100 UNIT/ML pen Inject 15 Units Subcutaneous At Bedtime   Be Faust MD   insulin lispro (HUMALOG KWIKPEN) 100 UNIT/ML (1 unit dial) KWIKPEN INJECT 5 UNITS SUBQ 3 TIMES DAILY WITH MEALS;INJECT 1-7 UNITS SUBQ 3 TIMES DAILY BEFORE MEALS   Reported, Patient   lisinopril (ZESTRIL) 5 MG tablet TAKE 1  TAB BY MOUTH ONCE DAILY   Reported, Patient   magnesium oxide (MAG-OX) 400 MG tablet Take 400 mg by mouth 2 times daily   Unknown, Entered By History   metFORMIN (GLUCOPHAGE-XR) 500 MG 24 hr tablet Take 500 mg by mouth daily (with dinner)   Unknown, Entered By History   metoprolol succinate ER (TOPROL-XL) 25 MG 24 hr tablet Take 25 mg by mouth daily   Unknown, Entered By History   Multiple Vitamin (MULTIVITAMIN ADULT PO) Take 1 tablet by mouth daily   Unknown, Entered By History   nitroGLYcerin (NITROSTAT) 0.4 MG sublingual tablet Place 0.4 mg under the tongue every 5 minutes as needed for chest pain For chest pain place 1 tablet under the tongue every 5 minutes for 3 doses. If symptoms persist 5 minutes after 1st dose call 911.   Unknown, Entered By History   omeprazole (PRILOSEC) 20 MG DR capsule Take 20 mg by mouth daily   Unknown, Entered By History   tamsulosin (FLOMAX) 0.4 MG capsule Take 0.4 mg by mouth daily   Unknown, Entered By History   UNABLE TO FIND Apply 1 Application topically twice a week MEDICATION NAME: Wound vac change with home health   Yan Bear MD   UNABLE TO FIND Apply 2 Units topically daily MEDICATION NAME:  sock for BKA   Yan Bear MD   VITAMIN  B COMPLEX tablet Take 1 tablet by mouth daily   Reported, Patient   vitamin B-Complex Take 1 tablet by mouth daily   Unknown, Entered By History       Allergies:  No Known Allergies    Problem List:  Patient Active Problem List   Diagnosis     Diabetic ulcer of right foot (H)     Coronary atherosclerosis     DMII (diabetes mellitus, type 2) (H)     Essential hypertension     GERD (gastroesophageal reflux disease)     S/P CABG (coronary artery bypass graft)     Status post below-knee amputation of right lower extremity (H)       Past Medical History:  Past Medical History:   Diagnosis Date     Coronary atherosclerosis 12/30/2014     Diabetic ulcer of left foot (H) 8/19/2021     DMII (diabetes mellitus, type 2) (H) 10/18/2013      Essential hypertension 10/18/2013    Formatting of this note might be different from the original. IMO Update     GERD (gastroesophageal reflux disease) 9/11/2013     S/P CABG (coronary artery bypass graft) 4/28/2015       Past Surgical History:  Past Surgical History:   Procedure Laterality Date     AMPUTATE LEG BELOW KNEE Right 8/28/2021    Procedure: AMPUTATION, BELOW KNEE;  Surgeon: Yan Bear MD;  Location: [facility] OR     AS CABG, ARTERIAL, THREE         Social History:  Social History     Tobacco Use     Smoking status: Former Smoker     Smokeless tobacco: Never Used   Substance Use Topics     Alcohol use: Not Currently     Drug use: Not Currently       Review of Systems:  Complete review of systems obtained and pertinent positive and negative findings noted in HPI. Review of systems otherwise negative.      Physical Exam     Vital signs: BP (!) 171/74   Pulse 67   Temp 97.6  F (36.4  C) (Tympanic)   Resp 20   Wt 77.1 kg (170 lb)   SpO2 99%   BMI 26.63 kg/m      Physical Exam:    General: awake and alert, comfortable  HEENT: atraumatic, no scleral injection, no nasal discharge, neck supple  Chest: clear to auscultation bilaterally without wheezes or crackles, non labored respirations, symmetric chest rise  Cardiovascular: regular rate and rhythm, no murmurs or gallops  Abdomen: soft, nontender, no rebound or guarding, nondistended  Extremities: no deformities, edema, or tenderness  Skin: wound on right BKA 14 cm x 4 cm x 1.0 cm is open and is granulating appropriately, without discharge, and without surrounding erythema  Neuro: alert and oriented x 3, moving extremities x 4, ambulates without difficulty      Medical Decision Making & ED Course     Adam Duarte is a 66 year old old male presenting fro change of his wound vac. thsiw as done and follow up confirmed with Dr Bear. He is to have it changed on mondays and Thurday by home health. Patient given instructions on follow-up and warning signs  for which to return to ED. All questions were answered and the patient is comfortable with plan for discharge. The patient was discharged in stable condition.    Diagnosis & Disposition     Diagnosis:  Wound check    Disposition:  home    MD Sonny Acuña Theresa M, MD  11/08/21 1823       Delia Dennis MD  11/22/21 0726

## 2021-11-09 NOTE — PROGRESS NOTES
Wound vac dressing changed and canister changed. Pt tolerated well. Wound had slight sero sanguinous drainage noted. Wound was clean no signs of infection noted.  Granulating well. Wound vac turned on and good suction noted. Patient instructed to return to clinic Thursday to have changed again if no home care agency is involved. Pt verbalizes understanding. Extra canister and dressing supplies sent with patient. Pt denies questions at this time. Pt left via w/c to home.

## 2021-11-15 ENCOUNTER — TELEPHONE (OUTPATIENT)
Dept: FAMILY MEDICINE | Facility: OTHER | Age: 66
End: 2021-11-15

## 2021-11-15 ENCOUNTER — TELEPHONE (OUTPATIENT)
Dept: FAMILY MEDICINE | Facility: OTHER | Age: 66
End: 2021-11-15
Payer: COMMERCIAL

## 2021-11-15 ENCOUNTER — OFFICE VISIT (OUTPATIENT)
Dept: SURGERY | Facility: OTHER | Age: 66
End: 2021-11-15
Attending: SURGERY
Payer: COMMERCIAL

## 2021-11-15 ENCOUNTER — TELEPHONE (OUTPATIENT)
Dept: SURGERY | Facility: OTHER | Age: 66
End: 2021-11-15
Payer: COMMERCIAL

## 2021-11-15 VITALS
OXYGEN SATURATION: 98 % | DIASTOLIC BLOOD PRESSURE: 84 MMHG | RESPIRATION RATE: 18 BRPM | HEART RATE: 66 BPM | TEMPERATURE: 98.5 F | SYSTOLIC BLOOD PRESSURE: 140 MMHG

## 2021-11-15 DIAGNOSIS — T14.8XXA OPEN WOUND: Primary | ICD-10-CM

## 2021-11-15 DIAGNOSIS — L08.9 LEFT FOOT INFECTION: ICD-10-CM

## 2021-11-15 PROCEDURE — 97606 NEG PRS WND THER DME>50 SQCM: CPT | Performed by: SURGERY

## 2021-11-15 PROCEDURE — 99024 POSTOP FOLLOW-UP VISIT: CPT | Performed by: SURGERY

## 2021-11-15 PROCEDURE — G0463 HOSPITAL OUTPT CLINIC VISIT: HCPCS

## 2021-11-15 ASSESSMENT — PAIN SCALES - GENERAL: PAINLEVEL: NO PAIN (0)

## 2021-11-15 NOTE — TELEPHONE ENCOUNTER
Patient coming in for a wound vac change today.  Home care was ordered two weeks ago but home care did not get the referral.  Referral information faxed to [facility].  Juanita Daniel LPN..........11/15/2021  12:17 PM

## 2021-11-15 NOTE — TELEPHONE ENCOUNTER
Needing orders trying to get orders the last week or so---do they have to do dressing changes or what they need a call as soon as possible

## 2021-11-15 NOTE — TELEPHONE ENCOUNTER
Patient needs more Wound Vac Canisters.  Please call.       Marium Collazo on 11/15/2021 at 9:28 AM

## 2021-11-15 NOTE — TELEPHONE ENCOUNTER
Referral faxed to [facility] for wound vac change.  Juanita Daniel LPN..........11/15/2021  2:16 PM

## 2021-11-15 NOTE — PROGRESS NOTES
Adam Duarte presents to clinic for wound VAC change of right below the knee amputation incision that opened up.  Patient of the wound VAC  but on roughly 1 week ago and it has not been changed since.  Currently home health care was not notified that this patient needs his wound VAC changed.  Patient denies fevers or chills.  States the wound VAC has had a good seal since placement.      BP (!) 140/84 (BP Location: Left arm, Patient Position: Sitting, Cuff Size: Adult Large)   Pulse 66   Temp 98.5  F (36.9  C) (Tympanic)   Resp 18   SpO2 98%     Wound VAC is removed and there is a bed of healthy granulation tissue in the wound.  Wound measures 15 cm x 3.5 cm.  There is essentially no depth to the wound.  There is no surrounding erythema or induration.  Very little leg swelling today.  Wound VAC is replaced in clinic today.      Adam Duarte is a 66-year-old male with an open wound on the right lower extremity after a below the knee amputation incision dehisced.  The wound is healing nicely now with healthy granulation tissue no sign of infection.  Continue negative pressure wound VAC therapy follow-up in surgery clinic in 2 weeks.  Home health was contacted and will be visiting the patient for regular wound VAC changes twice per week.         Ralph Subramanian MD

## 2021-11-15 NOTE — NURSING NOTE
"Chief Complaint   Patient presents with     Clinic Care Coordination - Follow-up     follow up wound care       Initial BP (!) 140/84 (BP Location: Left arm, Patient Position: Sitting, Cuff Size: Adult Large)   Pulse 66   Temp 98.5  F (36.9  C) (Tympanic)   Resp 18   SpO2 98%  Estimated body mass index is 26.63 kg/m  as calculated from the following:    Height as of 8/19/21: 1.702 m (5' 7\").    Weight as of 11/8/21: 77.1 kg (170 lb).  Medication Reconciliation: complete    Juanita Daniel LPN  "

## 2021-11-16 ENCOUNTER — TELEPHONE (OUTPATIENT)
Dept: SURGERY | Facility: OTHER | Age: 66
End: 2021-11-16
Payer: COMMERCIAL

## 2021-11-16 NOTE — TELEPHONE ENCOUNTER
Change wound VAC twice weekly.  Continue Neosporin daily to left toes and dry guaze dressing.  Stop soaking in Epsom salts.  Finish current antibiotic.  Follow-up with Dr FREDERIC Bear next week

## 2021-11-16 NOTE — TELEPHONE ENCOUNTER
Spoke with [facility] care nurse.  They are needing orders for patient's wound vac changes.  They need to know how often they are changing this.  (looks like 2 times per week according to last office visit note).  Nurse also states that patient has wounds on his left toes and currently patient has been soaking them in epsom salt and covering them with triple antibiotic ointment gauze and an ABD pad.  They also state that Ralph Hernandez MD told patient he would be changing his antibiotics for this as he did not finish the course that was previously prescribed by Yan Bear MD.  No prescription was sent for this.   Claudette Hernandez LPN........................11/16/2021  11:07 AM

## 2021-11-16 NOTE — TELEPHONE ENCOUNTER
Home care nurse notified.  Orders will be faxed to them as well for this.  Patient is already scheduled for follow up next week.  Claudette Hernandez LPN........................11/16/2021  11:32 AM

## 2021-11-19 ENCOUNTER — APPOINTMENT (OUTPATIENT)
Dept: GENERAL RADIOLOGY | Facility: OTHER | Age: 66
End: 2021-11-19
Attending: PHYSICIAN ASSISTANT
Payer: COMMERCIAL

## 2021-11-19 ENCOUNTER — HOSPITAL ENCOUNTER (EMERGENCY)
Facility: OTHER | Age: 66
Discharge: HOME | End: 2021-11-19
Attending: PHYSICIAN ASSISTANT | Admitting: PHYSICIAN ASSISTANT
Payer: COMMERCIAL

## 2021-11-19 VITALS
HEIGHT: 67 IN | BODY MASS INDEX: 26.68 KG/M2 | TEMPERATURE: 97.3 F | WEIGHT: 170 LBS | RESPIRATION RATE: 16 BRPM | OXYGEN SATURATION: 98 % | DIASTOLIC BLOOD PRESSURE: 75 MMHG | HEART RATE: 68 BPM | SYSTOLIC BLOOD PRESSURE: 151 MMHG

## 2021-11-19 DIAGNOSIS — L08.9: ICD-10-CM

## 2021-11-19 DIAGNOSIS — Z51.89 ENCOUNTER FOR WOUND CARE: ICD-10-CM

## 2021-11-19 DIAGNOSIS — S90.415A: ICD-10-CM

## 2021-11-19 DIAGNOSIS — L97.522 SKIN ULCER OF LEFT FOOT INCLUDING TOES WITH FAT LAYER EXPOSED (H): Primary | ICD-10-CM

## 2021-11-19 PROCEDURE — 73630 X-RAY EXAM OF FOOT: CPT | Mod: LT

## 2021-11-19 PROCEDURE — 97606 NEG PRS WND THER DME>50 SQCM: CPT | Performed by: SURGERY

## 2021-11-19 PROCEDURE — 99283 EMERGENCY DEPT VISIT LOW MDM: CPT | Mod: 25 | Performed by: PHYSICIAN ASSISTANT

## 2021-11-19 PROCEDURE — 99282 EMERGENCY DEPT VISIT SF MDM: CPT | Performed by: PHYSICIAN ASSISTANT

## 2021-11-19 PROCEDURE — 97606 NEG PRS WND THER DME>50 SQCM: CPT | Mod: RT | Performed by: PHYSICIAN ASSISTANT

## 2021-11-19 RX ORDER — CLINDAMYCIN HCL 300 MG
CAPSULE ORAL
COMMUNITY
Start: 2021-10-28

## 2021-11-19 ASSESSMENT — ENCOUNTER SYMPTOMS
FEVER: 0
BRUISES/BLEEDS EASILY: 0
SHORTNESS OF BREATH: 0
WOUND: 1
CHEST TIGHTNESS: 0
CONFUSION: 0
HEMATURIA: 0
ABDOMINAL PAIN: 0
CHILLS: 0
BACK PAIN: 0

## 2021-11-19 ASSESSMENT — MIFFLIN-ST. JEOR: SCORE: 1519.74

## 2021-11-19 NOTE — ED TRIAGE NOTES
"Pt here with friend, pt reports that homecare was at his house today to do a wound check on right leg stump and replace a wound vac, nurses there were concerned about the wound and were having issues with the wound vac so they told pt to come into ER, pt also wants his 3rd and 4th toes looked at on his left foot, VSS, no acute distress, pt out into waiting room to wait for ED room  ED Nursing Triage Note (General)   ________________________________    Adam Duarte is a 66 year old Male that presents to triage private car  With history of  Wound check reported by patient   Significant symptoms had onset today .Pulse 71   Temp 97.3  F (36.3  C) (Temporal)   Resp 16   Ht 1.702 m (5' 7\")   Wt 77.1 kg (170 lb)   SpO2 98%   BMI 26.63 kg/m  t  Patient appears alert  and oriented, in no acute distress., and cooperative and pleasant behavior.    GCS Total = 15  Airway: intact  Breathing noted as Normal  Circulation Normal  Skin:  Normal  Action taken:  Triage order initiated      PRE [facility] PRIOR LIVING SITUATION Spouse    "

## 2021-11-20 NOTE — ED PROVIDER NOTES
History     Chief Complaint   Patient presents with     Wound Check     HPI  Adam Duarte is a 66 year old male who presents to the ED for evaluation of a wound check. Pt here with PCA, pt reports that homecare was at his house today to do a wound check on right leg stump and replace a wound vac, nurses there were concerned about the wound and were having issues with the wound vac so they told pt to come into ER, pt also wants his 3rd and 4th toes looked at on his left foot as they have been more painful for him lately.     Allergies:  No Known Allergies    Problem List:    Patient Active Problem List    Diagnosis Date Noted     Status post below-knee amputation of right lower extremity (H) 09/15/2021     Priority: Medium     Diabetic ulcer of right foot (H) 08/19/2021     Priority: Medium     S/P CABG (coronary artery bypass graft) 04/28/2015     Priority: Medium     Coronary atherosclerosis 12/30/2014     Priority: Medium     DMII (diabetes mellitus, type 2) (H) 10/18/2013     Priority: Medium     Essential hypertension 10/18/2013     Priority: Medium     Formatting of this note might be different from the original.  IMO Update       GERD (gastroesophageal reflux disease) 09/11/2013     Priority: Medium        Past Medical History:    Past Medical History:   Diagnosis Date     Coronary atherosclerosis 12/30/2014     Diabetic ulcer of left foot (H) 8/19/2021     DMII (diabetes mellitus, type 2) (H) 10/18/2013     Essential hypertension 10/18/2013     GERD (gastroesophageal reflux disease) 9/11/2013     S/P CABG (coronary artery bypass graft) 4/28/2015       Past Surgical History:    Past Surgical History:   Procedure Laterality Date     AMPUTATE LEG BELOW KNEE Right 8/28/2021    Procedure: AMPUTATION, BELOW KNEE;  Surgeon: Yan Bear MD;  Location:  OR     AS CABG, ARTERIAL, THREE         Family History:    Family History   Problem Relation Age of Onset     Coronary Artery Disease Father        Social  "History:  Marital Status:   [4]  Social History     Tobacco Use     Smoking status: Former Smoker     Smokeless tobacco: Never Used   Substance Use Topics     Alcohol use: Not Currently     Drug use: Not Currently        Medications:    acetaminophen (TYLENOL) 325 MG tablet  aspirin 81 MG EC tablet  atorvastatin (LIPITOR) 20 MG tablet  clindamycin (CLEOCIN) 300 MG capsule  clopidogrel (PLAVIX) 75 MG tablet  cyanocobalamin (VITAMIN B-12) 1000 MCG tablet  cyanocobalamin (VITAMIN B-12) 500 MCG tablet  escitalopram (LEXAPRO) 20 MG tablet  insulin detemir (LEVEMIR PEN) 100 UNIT/ML pen  insulin detemir (LEVEMIR PEN) 100 UNIT/ML pen  insulin lispro (HUMALOG KWIKPEN) 100 UNIT/ML (1 unit dial) KWIKPEN  lisinopril (ZESTRIL) 5 MG tablet  magnesium oxide (MAG-OX) 400 MG tablet  metFORMIN (GLUCOPHAGE-XR) 500 MG 24 hr tablet  metoprolol succinate ER (TOPROL-XL) 25 MG 24 hr tablet  Multiple Vitamin (MULTIVITAMIN ADULT PO)  nitroGLYcerin (NITROSTAT) 0.4 MG sublingual tablet  omeprazole (PRILOSEC) 20 MG DR capsule  tamsulosin (FLOMAX) 0.4 MG capsule  UNABLE TO FIND  UNABLE TO FIND  VITAMIN  B COMPLEX tablet  vitamin B-Complex          Review of Systems   Constitutional: Negative for chills and fever.   HENT: Negative for congestion.    Eyes: Negative for visual disturbance.   Respiratory: Negative for chest tightness and shortness of breath.    Cardiovascular: Negative for chest pain.   Gastrointestinal: Negative for abdominal pain.   Genitourinary: Negative for hematuria.   Musculoskeletal: Negative for back pain.        BKA right side with open wound. Pain to left 3rd/4th toes   Skin: Positive for wound. Negative for rash.   Neurological: Negative for syncope.   Hematological: Does not bruise/bleed easily.   Psychiatric/Behavioral: Negative for confusion.       Physical Exam   BP: (!) 141/63  Pulse: 71  Temp: 97.3  F (36.3  C)  Resp: 16  Height: 170.2 cm (5' 7\")  Weight: 77.1 kg (170 lb)  SpO2: 98 %      Physical " Exam  Constitutional:       General: He is not in acute distress.     Appearance: He is well-developed. He is not diaphoretic.   HENT:      Head: Normocephalic and atraumatic.   Eyes:      General: No scleral icterus.     Conjunctiva/sclera: Conjunctivae normal.   Cardiovascular:      Rate and Rhythm: Normal rate and regular rhythm.   Pulmonary:      Effort: Pulmonary effort is normal.      Breath sounds: Normal breath sounds.   Abdominal:      Palpations: Abdomen is soft.      Tenderness: There is no abdominal tenderness.   Musculoskeletal:         General: No deformity.      Cervical back: Neck supple.      Comments: BTKA to right leg, dehisced wound, appears to have healthy granulation tissue. Left 3rd/4th toes have open wound with fat layer exposure.   Lymphadenopathy:      Cervical: No cervical adenopathy.   Skin:     General: Skin is warm and dry.      Coloration: Skin is not jaundiced.      Findings: No rash.   Neurological:      Mental Status: He is alert and oriented to person, place, and time. Mental status is at baseline.   Psychiatric:         Mood and Affect: Mood normal.         Behavior: Behavior normal.         Thought Content: Thought content normal.         Judgment: Judgment normal.         ED Course        Procedures              Critical Care time:  none               Results for orders placed or performed during the hospital encounter of 11/19/21 (from the past 24 hour(s))   XR Foot Left G/E 3 Views    Narrative    Exam: XR FOOT LEFT G/E 3 VIEWS    Technique: Left foot, 3 Views    Comparison: None.    Exam reason: ulcers/infection to 3rd and 4th left toes, osteomyelitis?    Findings:  No acute fracture or dislocation. Joint spaces are well maintained.   Small plantar calcaneal spur. No focal lucency or cortical  destruction.    Vascular calcifications are noted.      Impression    Impression:  No acute fracture or dislocation.    No focal lucency or cortical projection to suggest  osteomyelitis.    ALONDRA STOREY MD         SYSTEM ID:  [id number]       Medications - No data to display    Assessments & Plan (with Medical Decision Making)   Pt nontoxic in NAD. Heart, lung, bowel sounds normal, abd soft, nontender to palpation, nondistended. VSS, afebrile.     He does have BTKA to right leg, dehisced wound, appears to have healthy granulation tissue. Left 3rd/4th toes have open wound with fat layer exposure.     Xray read as:  -No acute fracture or dislocation.     -No focal lucency or cortical projection to suggest osteomyelitis.    Dr. Darrius Bear, surgeon, did come see the patient in the ED. He reapplied a wound vac and placed a referral for the patient to follow up with vascular surgery for left toes.     Strict return precautions are given to the pt, they will return if symptoms are worsening or concerning. The pt understands and agrees with the plan and they are discharged.     Dimitris Lauren PA-C            I have reviewed the nursing notes.    I have reviewed the findings, diagnosis, plan and need for follow up with the patient.       Discharge Medication List as of 11/19/2021  7:37 PM          Final diagnoses:   Encounter for wound care   Infected abrasion of toe, left, initial encounter       11/19/2021   [facility] \[facility]\""     Dimitris Lauren PA  11/19/21 0375

## 2021-11-20 NOTE — DISCHARGE INSTRUCTIONS
Get plenty of fluids and rest, continue with wound vac changes at home. Apply bacitracin to left toes. Continue with home antibiotic. Referral placed for follow up with vascular surgery to address left leg. Return if worsening.

## 2021-11-24 NOTE — TELEPHONE ENCOUNTER
Given more supplies until he gets order shipped to him from [facility].  Juanita Daniel LPN..........11/24/2021  3:57 PM

## 2021-12-03 NOTE — PROCEDURES
Procedure Note      Pre/Post Operative Diagnosis:   BKA stump traumatic dehiscence     Procedure:   Negative pressure wound vac placement.     Surgeon: Yan Bear MD    Local Anesthesia:     Indication for the procedure:      Procedure:   The vac was removed. The area now measures 12 cm X 5 cm X 0.5 cm. A vac was replaced.     Plan:  Change 2 x weekly.     Yan Bear MD....................  12/3/2021   9:15 AM

## 2021-12-07 ENCOUNTER — TRANSFERRED RECORDS (OUTPATIENT)
Dept: HEALTH INFORMATION MANAGEMENT | Facility: OTHER | Age: 66
End: 2021-12-07

## 2021-12-07 ENCOUNTER — OFFICE VISIT (OUTPATIENT)
Dept: SURGERY | Facility: OTHER | Age: 66
End: 2021-12-07
Attending: SURGERY
Payer: COMMERCIAL

## 2021-12-07 VITALS
OXYGEN SATURATION: 96 % | HEART RATE: 73 BPM | BODY MASS INDEX: 26.63 KG/M2 | DIASTOLIC BLOOD PRESSURE: 72 MMHG | TEMPERATURE: 98.3 F | SYSTOLIC BLOOD PRESSURE: 130 MMHG | WEIGHT: 170 LBS | RESPIRATION RATE: 18 BRPM

## 2021-12-07 DIAGNOSIS — Z89.511 HX OF RIGHT BKA (H): Primary | ICD-10-CM

## 2021-12-07 PROCEDURE — G0463 HOSPITAL OUTPT CLINIC VISIT: HCPCS

## 2021-12-07 PROCEDURE — 97605 NEG PRS WND THER DME<=50SQCM: CPT | Performed by: SURGERY

## 2021-12-07 PROCEDURE — 97605 NEG PRS WND THER DME<=50SQCM: CPT | Mod: 78 | Performed by: SURGERY

## 2021-12-07 ASSESSMENT — PAIN SCALES - GENERAL: PAINLEVEL: SEVERE PAIN (7)

## 2021-12-07 NOTE — PROGRESS NOTES
S:   Adam is here for follow-up and wound VAC change.  He is tolerating his wound VAC well.  He has a vascular appointment today in [place].  He notes that his left foot is hurting and continues to progression of ulceration.    /72 (BP Location: Right arm, Patient Position: Sitting, Cuff Size: Adult Large)   Pulse 73   Temp 98.3  F (36.8  C) (Tympanic)   Resp 18   Wt 77.1 kg (170 lb)   SpO2 96%   BMI 26.63 kg/m      General: NAD, pleasant and cooperative with exam and interview.  Extremity: Right BKA stump has dehiscence that measures 1.5 cm x 3 cm x 0.5 cm.  There is granulation tissue in the wound.  There is a mild amount of exudate.    Psychiatry: awake, alert and oriented. Appropriate affect.    Assessment/Plan:  66-year-old diabetic male with history of right BKA status post wound dehiscence after falling on his stump.  Ulceration of toes 3 and 4 of the left foot with failure to heal.  -Continue antimicrobial coverage  Vascular referral today.    Yan Bear MD on 12/7/2021 at 10:51 AM

## 2021-12-09 ENCOUNTER — TELEPHONE (OUTPATIENT)
Dept: SURGERY | Facility: OTHER | Age: 66
End: 2021-12-09
Payer: COMMERCIAL

## 2021-12-09 NOTE — TELEPHONE ENCOUNTER
Home care nurse Dalila called to state that wound vac is not suctioning at all.  Spoke with [facility] rep who recommended troubleshooting suction/vs seal.  Called Dalila back to do this and vac is now alarming that canister is full.  Dalila states that canister does not look full.  Had them take canister off and put it back on.  No change.  Dalila states that canister on is the last one that they had. Spoke with Yan Bear MD   who thinks that the cansiter may have gotten plugged.  Home care nurse will  extra canister and try that.  If vac is still not working ok given per Yan Bear MD to switch to wet to dry dressing while new vac is obtained.    Claudette Hernandez LPN........................12/9/2021  10:26 AM

## 2021-12-15 ENCOUNTER — TELEPHONE (OUTPATIENT)
Dept: SURGERY | Facility: OTHER | Age: 66
End: 2021-12-15
Payer: COMMERCIAL

## 2021-12-16 NOTE — TELEPHONE ENCOUNTER
Supplies shipment got sent back to [place] per [facility] rep.  They are reshipping canisters and dressings to patient.  Claudette Hernandez LPN........................12/16/2021  8:37 AM

## 2021-12-20 ENCOUNTER — TELEPHONE (OUTPATIENT)
Dept: SURGERY | Facility: OTHER | Age: 66
End: 2021-12-20
Payer: COMMERCIAL

## 2021-12-20 NOTE — TELEPHONE ENCOUNTER
SHELBY notified that patient is still using wound vac.  Claudette Hernandez LPN........................12/20/2021  2:03 PM

## 2021-12-20 NOTE — TELEPHONE ENCOUNTER
Soni from [facility] called needing to speak to nurse to see when patients wound vac was stopped.     Please call Soni [phone] [phone]    .Malathi Aguilar on 12/20/2021 at 11:50 AM

## 2021-12-23 ENCOUNTER — TELEPHONE (OUTPATIENT)
Dept: SURGERY | Facility: OTHER | Age: 66
End: 2021-12-23
Payer: COMMERCIAL

## 2021-12-23 NOTE — TELEPHONE ENCOUNTER
S-(situation): Pt having severe pain, hasn't slept in 3-4 days, pain medications aren't helping.    B-(background): 66-year-old diabetic male with history of right BKA status post wound dehiscence after falling on his stump.  Ulceration of toes 3 and 4 of the left foot with failure to heal. Seen at [facility] for wound VAC change. Vascular referral made 127/21.    A-(assessment): Pt states toes 3rd & 4th toes of left foot are completely black and 2nd toe is purplish. Sensation of wound spreading to under part of foot, extending from toes; unable to visualize and home care not scheduled until next week. Pain rated 10/10 and no relief from Tramadol and Tylenol nightly. Elevates left leg occasionally. Hasn't slept in 3-4 days, due to pain. Also notes presence of cold sx (cough, sinus congestion). Denies: fever, chills, red streak extending from toes, redness/swelling/warmth of foot, leg swelling    R-(recommendations): Per telephone consult with on-call surgeon, Yan Bear, does not sound like acute change since LOV (12/7), as described by Pt. Dr. Bear recommends Pt keep upcoming appointment with Dr. Subramanian on Monday 12/27. If he develops signs of symptoms of infection, where foot is threatened (foul odor drainage, swelling, red streaking or spreading redness, warmth, fever, chills), Pt needs to be seen in ED immediately. The patient indicates understanding of these issues and agrees with the plan. Cate Treviño RN .............. 12/23/2021  5:01 PM

## 2021-12-23 NOTE — TELEPHONE ENCOUNTER
Pt called and is having a lot of pain and meds aren't working.  Has been up for 3 or 4 days.    Claudette Turpin on 12/23/2021 at 4:08 PM

## 2022-01-10 ENCOUNTER — NURSING HOME VISIT (OUTPATIENT)
Dept: GERIATRICS | Facility: OTHER | Age: 67
End: 2022-01-10
Payer: MEDICARE

## 2022-01-10 VITALS
OXYGEN SATURATION: 96 % | DIASTOLIC BLOOD PRESSURE: 82 MMHG | RESPIRATION RATE: 16 BRPM | HEART RATE: 62 BPM | WEIGHT: 172 LBS | TEMPERATURE: 97 F | BODY MASS INDEX: 26.94 KG/M2 | SYSTOLIC BLOOD PRESSURE: 164 MMHG

## 2022-01-10 DIAGNOSIS — Z79.4 TYPE 2 DIABETES MELLITUS WITH FOOT ULCER, WITH LONG-TERM CURRENT USE OF INSULIN (H): ICD-10-CM

## 2022-01-10 DIAGNOSIS — D64.9 POSTOPERATIVE ANEMIA: ICD-10-CM

## 2022-01-10 DIAGNOSIS — L97.509 TYPE 2 DIABETES MELLITUS WITH FOOT ULCER, WITH LONG-TERM CURRENT USE OF INSULIN (H): ICD-10-CM

## 2022-01-10 DIAGNOSIS — I25.10 ATHEROSCLEROSIS OF NATIVE CORONARY ARTERY OF NATIVE HEART WITHOUT ANGINA PECTORIS: ICD-10-CM

## 2022-01-10 DIAGNOSIS — Z89.511 STATUS POST BELOW-KNEE AMPUTATION OF RIGHT LOWER EXTREMITY (H): ICD-10-CM

## 2022-01-10 DIAGNOSIS — Z89.512 STATUS POST BELOW-KNEE AMPUTATION OF LEFT LOWER EXTREMITY (H): Primary | ICD-10-CM

## 2022-01-10 DIAGNOSIS — I10 ESSENTIAL HYPERTENSION: ICD-10-CM

## 2022-01-10 DIAGNOSIS — E11.621 TYPE 2 DIABETES MELLITUS WITH FOOT ULCER, WITH LONG-TERM CURRENT USE OF INSULIN (H): ICD-10-CM

## 2022-01-10 PROCEDURE — 99310 SBSQ NF CARE HIGH MDM 45: CPT | Performed by: NURSE PRACTITIONER

## 2022-01-10 ASSESSMENT — ENCOUNTER SYMPTOMS
APPETITE CHANGE: 1
POLYPHAGIA: 0
POLYDIPSIA: 0
FEVER: 0
WEAKNESS: 1
LIGHT-HEADEDNESS: 0
ABDOMINAL PAIN: 0
NERVOUS/ANXIOUS: 0
DIZZINESS: 0
SHORTNESS OF BREATH: 0
DIAPHORESIS: 0
TROUBLE SWALLOWING: 0
CHEST TIGHTNESS: 0
DIFFICULTY URINATING: 0
CONFUSION: 0
HEMATOCHEZIA: 0
CHILLS: 0
ANAL BLEEDING: 1
HEMATURIA: 0
COUGH: 0
WOUND: 1
DYSPHORIC MOOD: 0
NAUSEA: 0
DYSURIA: 0
PALPITATIONS: 0
UNEXPECTED WEIGHT CHANGE: 0

## 2022-01-10 NOTE — PROGRESS NOTES
Subjective:  Patient is seen today for initial nurse practitioner evaluation.  He was hospitalized at [facility] from January 1 through January 6, 2022.  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  He had evidence of dry gangrene of the left foot which required urgent amputation.  He also has type 2 diabetes with blood sugars running low while hospitalized leading to change in his insulin orders.  Levemir was discontinued.  He was continued on mealtime insulin and metformin.  Since being at [facility] nursing facility his blood sugars range 111-3 56.  Levemir still on hold.  He also developed postoperative anemia while hospitalized.  He has a chronic anemia and hemoglobin typically ranges around 9.9.  He did not require any transfusions while hospitalized.  He currently is on iron sulfate twice daily.  Bleeding from his incision.  Wound VAC is still in place.  He does tell me however that this afternoon he had a soft bowel movement that is normally dark black in color and there was a dime size amount of bright red blood.  He states that his bottom is sore.  He is not sure if he has hemorrhoids.  He is on aspirin and Plavix.  Has known coronary disease with history of CABG.  His blood pressure has been elevated.  He only takes metoprolol succinate 25 mg once daily.  Per orthopedic recommendations the wound VAC was to be removed from the left BKA on January 9 and had a Mepilex dressing applied.  Prior to discharge from hospital he had wound VAC removed from right BKA and silicon foam dressing was applied.  He does have follow-up appointments with orthopedics and also general surgery next week.    Patient Active Problem List   Diagnosis     Diabetic ulcer of right foot (H)     Coronary atherosclerosis     DMII (diabetes mellitus, type 2) (H)     Essential hypertension     GERD (gastroesophageal reflux disease)     S/P CABG (coronary artery bypass graft)     Status post below-knee amputation  of right lower extremity (H)     Status post below-knee amputation of left lower extremity (H)     Past Medical History:   Diagnosis Date     Coronary atherosclerosis 12/30/2014     Diabetic ulcer of left foot (H) 8/19/2021     DMII (diabetes mellitus, type 2) (H) 10/18/2013     Essential hypertension 10/18/2013    Formatting of this note might be different from the original. IMO Update     GERD (gastroesophageal reflux disease) 9/11/2013     S/P CABG (coronary artery bypass graft) 4/28/2015     Past Surgical History:   Procedure Laterality Date     AMPUTATE LEG BELOW KNEE Right 8/28/2021    Procedure: AMPUTATION, BELOW KNEE;  Surgeon: Yan Bear MD;  Location: [facility] OR     AS CABG, ARTERIAL, THREE       Social History     Socioeconomic History     Marital status:      Spouse name: Not on file     Number of children: Not on file     Years of education: Not on file     Highest education level: Not on file   Occupational History     Not on file   Tobacco Use     Smoking status: Former Smoker     Smokeless tobacco: Never Used   Substance and Sexual Activity     Alcohol use: Not Currently     Drug use: Not Currently     Sexual activity: Not Currently   Other Topics Concern     Not on file   Social History Narrative     Not on file     Social Determinants of Health     Financial Resource Strain: Not on file   Food Insecurity: Not on file   Transportation Needs: Not on file   Physical Activity: Not on file   Stress: Not on file   Social Connections: Not on file   Intimate Partner Violence: Not on file   Housing Stability: Not on file     Family History   Problem Relation Age of Onset     Coronary Artery Disease Father      Patient has no known allergies.    Skilled Nursing Facility Medication Record reviewed    End of Life Planning:   Full code    Review of Systems:  Review of Systems   Constitutional: Positive for appetite change. Negative for chills, diaphoresis, fever and unexpected weight change.         Improved appetite since SNF admision   HENT: Negative for trouble swallowing.    Respiratory: Negative for cough, chest tightness and shortness of breath.    Cardiovascular: Negative for chest pain and palpitations.   Gastrointestinal: Positive for anal bleeding. Negative for abdominal pain, hematochezia and nausea.   Endocrine: Negative for polydipsia, polyphagia and polyuria.   Genitourinary: Negative for difficulty urinating, dysuria and hematuria.   Musculoskeletal: Positive for gait problem.   Skin: Positive for wound.   Allergic/Immunologic: Positive for immunocompromised state.   Neurological: Positive for weakness. Negative for dizziness, syncope and light-headedness.   Psychiatric/Behavioral: Negative for behavioral problems, confusion and dysphoric mood. The patient is not nervous/anxious.        Objective:   BP (!) 164/82   Pulse 62   Temp 97  F (36.1  C)   Resp 16   Wt 78 kg (172 lb)   SpO2 96%   BMI 26.94 kg/m    Physical Exam  Pleasant gentleman sitting in wheelchair no acute distress.  Skin color pale.  Mucous membranes moist.  Neck supple.  Lung fields clear to auscultation.  Cardiovascular regular, no S3.  Abdomen soft with normal bowel sounds x4 quadrants.  No tenderness masses or organomegaly.  Bilateral lower extremity amputee.  Wound VAC still in place but not suctioning at the left BKA site.  Wound VAC was removed and incision is well approximated and without drainage.  No erythema or warmth.  No swelling.  No tenderness.  Mepilex dressing not available at facility so nurse applied silicone bordered foam dressing.  Patient was assisted into bed by nursing assistant, LPN and this provider with [place] lift.  He tolerated well.  He does have some soft stool at the rectum that is black in color and he has 2 stage II pressure ulcers that each measure approximately half centimeter in diameter.  There is a scant amount of bright red blood coming from these areas.    Hospitalization notes,  laboratory and diagnostic studies are reviewed and discussed.    Assessment:    ICD-10-CM    1. Status post below-knee amputation of left lower extremity (H)  Z89.512    2. Status post below-knee amputation of right lower extremity (H)  Z89.511    3. Postoperative anemia  D64.9    4. Type 2 diabetes mellitus with foot ulcer, with long-term current use of insulin (H)  E11.621     L97.509     Z79.4    5. Essential hypertension  I10    6. Atherosclerosis of native coronary artery of native heart without angina pectoris  I25.10        Plan:   1.  Keep follow-up visits with orthopedics and general surgeon.  He is no longer on antibiotics.  Finished IV course while hospitalized.  Dressing change per orthopedic recommendations.  2.  Restart insulin detemir at 10 units twice daily, had previously been on 25 units twice daily.  Continue with currently prescribed mealtime/sliding scale insulin.  Follow-up with type 2 diabetes in 7-10 days, sooner with hypoglycemia.  3.  Increase metoprolol supinate to 37.5 mg twice daily.  Follow-up for hypertension in 7-10 days.  4.  Check CBC next lab day to follow-up on postoperative anemia.  Continue iron sulfate twice daily.  This may be the cause of his dark stool however there is concern that he has higher GI bleed with the stool color.  Hospital discharge hemoglobin was 7.5 g/dL with recommendations to transfuse at 7 g/dL or below.  He does have some scant bleeding from the 2 stage II pressure ulcers but that would not change his hemoglobin.  Recommend barrier cream twice daily and as needed and needs to offload pressure from perineal region at all times.  If hemoglobin continues to decline may need transfusion, further work-up and referral to general surgeon or GI to discuss if endoscopy indicated.    EDILBERTO Rodriguez   1/10/2022   4:14 PM

## 2022-01-11 ENCOUNTER — LAB REQUISITION (OUTPATIENT)
Dept: LAB | Facility: OTHER | Age: 67
End: 2022-01-11
Payer: MEDICARE

## 2022-01-11 DIAGNOSIS — D64.9 ANEMIA, UNSPECIFIED: ICD-10-CM

## 2022-01-11 LAB
BASOPHILS # BLD AUTO: 0 10E3/UL (ref 0–0.2)
BASOPHILS NFR BLD AUTO: 0 %
EOSINOPHIL # BLD AUTO: 0.2 10E3/UL (ref 0–0.7)
EOSINOPHIL NFR BLD AUTO: 2 %
ERYTHROCYTE [DISTWIDTH] IN BLOOD BY AUTOMATED COUNT: 15.6 % (ref 10–15)
HCT VFR BLD AUTO: 27.8 % (ref 40–53)
HGB BLD-MCNC: 8.9 G/DL (ref 13.3–17.7)
IMM GRANULOCYTES # BLD: 0.1 10E3/UL
IMM GRANULOCYTES NFR BLD: 1 %
LYMPHOCYTES # BLD AUTO: 1.7 10E3/UL (ref 0.8–5.3)
LYMPHOCYTES NFR BLD AUTO: 17 %
MCH RBC QN AUTO: 26.6 PG (ref 26.5–33)
MCHC RBC AUTO-ENTMCNC: 32 G/DL (ref 31.5–36.5)
MCV RBC AUTO: 83 FL (ref 78–100)
MONOCYTES # BLD AUTO: 0.6 10E3/UL (ref 0–1.3)
MONOCYTES NFR BLD AUTO: 6 %
NEUTROPHILS # BLD AUTO: 7.4 10E3/UL (ref 1.6–8.3)
NEUTROPHILS NFR BLD AUTO: 74 %
NRBC # BLD AUTO: 0 10E3/UL
NRBC BLD AUTO-RTO: 0 /100
PLATELET # BLD AUTO: 422 10E3/UL (ref 150–450)
RBC # BLD AUTO: 3.34 10E6/UL (ref 4.4–5.9)
WBC # BLD AUTO: 10 10E3/UL (ref 4–11)

## 2022-01-11 PROCEDURE — 85025 COMPLETE CBC W/AUTO DIFF WBC: CPT | Performed by: NURSE PRACTITIONER

## 2022-01-11 PROCEDURE — 36415 COLL VENOUS BLD VENIPUNCTURE: CPT | Performed by: NURSE PRACTITIONER

## 2022-01-17 ENCOUNTER — NURSING HOME VISIT (OUTPATIENT)
Dept: GERIATRICS | Facility: OTHER | Age: 67
End: 2022-01-17
Payer: MEDICARE

## 2022-01-17 VITALS
DIASTOLIC BLOOD PRESSURE: 65 MMHG | RESPIRATION RATE: 20 BRPM | WEIGHT: 172 LBS | SYSTOLIC BLOOD PRESSURE: 148 MMHG | BODY MASS INDEX: 26.94 KG/M2 | HEART RATE: 62 BPM

## 2022-01-17 DIAGNOSIS — L97.509 TYPE 2 DIABETES MELLITUS WITH FOOT ULCER, WITH LONG-TERM CURRENT USE OF INSULIN (H): Primary | ICD-10-CM

## 2022-01-17 DIAGNOSIS — I10 ESSENTIAL HYPERTENSION: ICD-10-CM

## 2022-01-17 DIAGNOSIS — E11.621 TYPE 2 DIABETES MELLITUS WITH FOOT ULCER, WITH LONG-TERM CURRENT USE OF INSULIN (H): Primary | ICD-10-CM

## 2022-01-17 DIAGNOSIS — Z79.4 TYPE 2 DIABETES MELLITUS WITH FOOT ULCER, WITH LONG-TERM CURRENT USE OF INSULIN (H): Primary | ICD-10-CM

## 2022-01-17 DIAGNOSIS — I25.10 ATHEROSCLEROSIS OF NATIVE CORONARY ARTERY OF NATIVE HEART WITHOUT ANGINA PECTORIS: ICD-10-CM

## 2022-01-17 PROCEDURE — 99309 SBSQ NF CARE MODERATE MDM 30: CPT | Performed by: NURSE PRACTITIONER

## 2022-01-17 ASSESSMENT — ENCOUNTER SYMPTOMS
POLYDIPSIA: 0
FATIGUE: 0
SHORTNESS OF BREATH: 0
POLYPHAGIA: 0

## 2022-01-17 NOTE — PROGRESS NOTES
Subjective:  Patient is seen today in follow-up of hypertension and type 2 diabetes.  At visit on January 10, 2021 metoprolol tartrate was increased to 37.5 mg twice daily and insulin detemir restarted at 10 units twice daily which was lower than his previous hospital dose.  Blood pressures now range from 134//72 and blood sugars  124-389.  Pulse is averaging 62-66.  He is not on any other medications for hypertension.  Last GFR while hospitalized normal.      Patient Active Problem List   Diagnosis     Diabetic ulcer of right foot (H)     Coronary atherosclerosis     DMII (diabetes mellitus, type 2) (H)     Essential hypertension     GERD (gastroesophageal reflux disease)     S/P CABG (coronary artery bypass graft)     Status post below-knee amputation of right lower extremity (H)     Status post below-knee amputation of left lower extremity (H)     Past Medical History:   Diagnosis Date     Coronary atherosclerosis 12/30/2014     Diabetic ulcer of left foot (H) 8/19/2021     DMII (diabetes mellitus, type 2) (H) 10/18/2013     Essential hypertension 10/18/2013    Formatting of this note might be different from the original. IMO Update     GERD (gastroesophageal reflux disease) 9/11/2013     S/P CABG (coronary artery bypass graft) 4/28/2015     Past Surgical History:   Procedure Laterality Date     AMPUTATE LEG BELOW KNEE Right 8/28/2021    Procedure: AMPUTATION, BELOW KNEE;  Surgeon: Yan Bear MD;  Location: [facility] OR     AS CABG, ARTERIAL, THREE       Social History     Socioeconomic History     Marital status:      Spouse name: Not on file     Number of children: Not on file     Years of education: Not on file     Highest education level: Not on file   Occupational History     Not on file   Tobacco Use     Smoking status: Former Smoker     Smokeless tobacco: Never Used   Substance and Sexual Activity     Alcohol use: Not Currently     Drug use: Not Currently     Sexual activity: Not Currently    Other Topics Concern     Not on file   Social History Narrative     Not on file     Social Determinants of Health     Financial Resource Strain: Not on file   Food Insecurity: Not on file   Transportation Needs: Not on file   Physical Activity: Not on file   Stress: Not on file   Social Connections: Not on file   Intimate Partner Violence: Not on file   Housing Stability: Not on file     Family History   Problem Relation Age of Onset     Coronary Artery Disease Father      Patient has no known allergies.    Skilled Nursing Facility Medication Record reviewed    End of Life Planning:   CPR    Review of Systems:  Review of Systems   Constitutional: Negative for fatigue.   Respiratory: Negative for shortness of breath.    Cardiovascular: Negative for chest pain.   Endocrine: Negative for polydipsia, polyphagia and polyuria.       Objective:   BP (!) 148/65   Pulse 62   Resp 20   Wt 78 kg (172 lb)   BMI 26.94 kg/m    Physical Exam  Pleasant gentleman alert and oriented x4.  No acute distress.  Lung fields clear to auscultation.  Cardiovascular regular.    Assessment:    ICD-10-CM    1. Type 2 diabetes mellitus with foot ulcer, with long-term current use of insulin (H)  E11.621     L97.509     Z79.4    2. Essential hypertension  I10    3. Atherosclerosis of native coronary artery of native heart without angina pectoris  I25.10        Plan:   Start HCTZ 12.5 mg daily.  Continue metoprolol tartrate as currently prescribed.  Increase insulin detemir to 15 units twice daily and continue with sliding scale insulin.  Follow-up for hypertension and type 2 diabetes in 2 weeks.  Check BMP in 2 weeks.    EDILBERTO Rodriguez   1/17/2022  3:51 PM

## 2022-02-01 ENCOUNTER — LAB REQUISITION (OUTPATIENT)
Dept: LAB | Facility: OTHER | Age: 67
End: 2022-02-01
Payer: MEDICARE

## 2022-02-01 DIAGNOSIS — E11.621 TYPE 2 DIABETES MELLITUS WITH FOOT ULCER (CODE) (H): ICD-10-CM

## 2022-02-01 LAB
ANION GAP SERPL CALCULATED.3IONS-SCNC: 7 MMOL/L (ref 3–14)
BUN SERPL-MCNC: 17 MG/DL (ref 7–25)
CALCIUM SERPL-MCNC: 9.4 MG/DL (ref 8.6–10.3)
CHLORIDE BLD-SCNC: 105 MMOL/L (ref 98–107)
CO2 SERPL-SCNC: 27 MMOL/L (ref 21–31)
CREAT SERPL-MCNC: 0.75 MG/DL (ref 0.7–1.3)
GFR SERPL CREATININE-BSD FRML MDRD: >90 ML/MIN/1.73M2
GLUCOSE BLD-MCNC: 172 MG/DL (ref 70–105)
POTASSIUM BLD-SCNC: 3.8 MMOL/L (ref 3.5–5.1)
SODIUM SERPL-SCNC: 139 MMOL/L (ref 134–144)

## 2022-02-01 PROCEDURE — 80048 BASIC METABOLIC PNL TOTAL CA: CPT | Performed by: NURSE PRACTITIONER

## 2022-02-03 ENCOUNTER — NURSING HOME VISIT (OUTPATIENT)
Dept: GERIATRICS | Facility: OTHER | Age: 67
End: 2022-02-03
Payer: MEDICARE

## 2022-02-03 ENCOUNTER — APPOINTMENT (OUTPATIENT)
Dept: GERIATRICS | Facility: OTHER | Age: 67
End: 2022-02-03
Attending: NURSE PRACTITIONER
Payer: MEDICARE

## 2022-02-03 VITALS
HEART RATE: 62 BPM | WEIGHT: 154 LBS | SYSTOLIC BLOOD PRESSURE: 135 MMHG | RESPIRATION RATE: 18 BRPM | OXYGEN SATURATION: 96 % | DIASTOLIC BLOOD PRESSURE: 66 MMHG | TEMPERATURE: 98 F | BODY MASS INDEX: 24.12 KG/M2

## 2022-02-03 DIAGNOSIS — L97.509 TYPE 2 DIABETES MELLITUS WITH FOOT ULCER, WITH LONG-TERM CURRENT USE OF INSULIN (H): ICD-10-CM

## 2022-02-03 DIAGNOSIS — Z89.512 STATUS POST BELOW-KNEE AMPUTATION OF LEFT LOWER EXTREMITY (H): Primary | ICD-10-CM

## 2022-02-03 DIAGNOSIS — E11.621 TYPE 2 DIABETES MELLITUS WITH FOOT ULCER, WITH LONG-TERM CURRENT USE OF INSULIN (H): ICD-10-CM

## 2022-02-03 DIAGNOSIS — R63.4 WEIGHT LOSS: ICD-10-CM

## 2022-02-03 DIAGNOSIS — Z89.511 STATUS POST BELOW-KNEE AMPUTATION OF RIGHT LOWER EXTREMITY (H): ICD-10-CM

## 2022-02-03 DIAGNOSIS — Z79.4 TYPE 2 DIABETES MELLITUS WITH FOOT ULCER, WITH LONG-TERM CURRENT USE OF INSULIN (H): ICD-10-CM

## 2022-02-03 DIAGNOSIS — D64.9 POSTOPERATIVE ANEMIA: ICD-10-CM

## 2022-02-03 DIAGNOSIS — I10 ESSENTIAL HYPERTENSION: ICD-10-CM

## 2022-02-03 PROCEDURE — 99316 NF DSCHRG MGMT 30 MIN+: CPT | Performed by: NURSE PRACTITIONER

## 2022-02-03 ASSESSMENT — ENCOUNTER SYMPTOMS
DIARRHEA: 0
ARTHRALGIAS: 0
VOMITING: 0
HEMATURIA: 0
SHORTNESS OF BREATH: 0
WOUND: 1
DIZZINESS: 0
DYSURIA: 0
UNEXPECTED WEIGHT CHANGE: 1
FATIGUE: 0
ENDOCRINE NEGATIVE: 1
TROUBLE SWALLOWING: 0
ABDOMINAL PAIN: 0
LIGHT-HEADEDNESS: 0
DIFFICULTY URINATING: 0
CHEST TIGHTNESS: 0
FEVER: 0
CONSTIPATION: 0
DYSPHORIC MOOD: 0
NAUSEA: 0
NERVOUS/ANXIOUS: 0

## 2022-02-03 NOTE — PROGRESS NOTES
Subjective:  Patient is requesting discharge back home tomorrow.  He was admitted to skilled nursing facility on January 6, 2022 after he was hospitalized with gangrenous ulcer of the left lower extremity requiring BKA.  He had previously had right BKA.  He developed postoperative anemia with hemoglobin at 7.5 g/dL.  This was rechecked at [facility] nursing facility and had improved to 8.9 g/dL.  While at skilled nursing facilities he also had adjustment in his medications for hypertension and diabetes.  He did require lower insulin needs while hospitalized and that was slowly titrated back up at [facility] nursing facility.  His sugars now range from 168-294 on basal and sliding scale insulin.  His blood pressures are well controlled.  He has a follow-up appointment scheduled with his orthopedic surgeon on February 18, 2022.  Patient still has Steri-Strips on left BKA.  Nursing staff recovering the Steri-Strips with two 4 inch Allevyn gentle border light dressings.  There has been no drainage erythema or warmth.  He denies pain.  He has worked with PT and OT.  He is able to transfer from bed to wheelchair with use of a sliding board.  Patient is in need of DME orders for sliding board and wheelchair.  He also is in the process of getting prosthesis made.  [facility]  By reviewing patient's chart he has had a weight loss of 15 pounds since admission.  He tells me that when he was first admitted that his appetite was not real good but now he is eating better.  He is eating 3 meals a day.  Has no nausea or diarrhea.    Patient Active Problem List   Diagnosis     Diabetic ulcer of right foot (H)     Coronary atherosclerosis     DMII (diabetes mellitus, type 2) (H)     Essential hypertension     GERD (gastroesophageal reflux disease)     S/P CABG (coronary artery bypass graft)     Status post below-knee amputation of right lower extremity (H)      Status post below-knee amputation of left lower extremity (H)     Past Medical History:   Diagnosis Date     Coronary atherosclerosis 12/30/2014     Diabetic ulcer of left foot (H) 8/19/2021     DMII (diabetes mellitus, type 2) (H) 10/18/2013     Essential hypertension 10/18/2013    Formatting of this note might be different from the original. IMO Update     GERD (gastroesophageal reflux disease) 9/11/2013     S/P CABG (coronary artery bypass graft) 4/28/2015     Past Surgical History:   Procedure Laterality Date     AMPUTATE LEG BELOW KNEE Right 8/28/2021    Procedure: AMPUTATION, BELOW KNEE;  Surgeon: Yan Bear MD;  Location: [facility] OR     AS CABG, ARTERIAL, THREE       Social History     Socioeconomic History     Marital status:      Spouse name: Not on file     Number of children: Not on file     Years of education: Not on file     Highest education level: Not on file   Occupational History     Not on file   Tobacco Use     Smoking status: Former Smoker     Smokeless tobacco: Never Used   Substance and Sexual Activity     Alcohol use: Not Currently     Drug use: Not Currently     Sexual activity: Not Currently   Other Topics Concern     Not on file   Social History Narrative     Not on file     Social Determinants of Health     Financial Resource Strain: Not on file   Food Insecurity: Not on file   Transportation Needs: Not on file   Physical Activity: Not on file   Stress: Not on file   Social Connections: Not on file   Intimate Partner Violence: Not on file   Housing Stability: Not on file     Family History   Problem Relation Age of Onset     Coronary Artery Disease Father      Patient has no known allergies.    Skilled Nursing Facility Medication Record reviewed    End of Life Planning:   Full code    Review of Systems:  Review of Systems   Constitutional: Positive for unexpected weight change. Negative for fatigue and fever.   HENT: Negative for trouble swallowing.    Respiratory: Negative for  chest tightness and shortness of breath.    Cardiovascular: Negative for chest pain and peripheral edema.   Gastrointestinal: Negative for abdominal pain, constipation, diarrhea, nausea and vomiting.   Endocrine: Negative.    Genitourinary: Negative for difficulty urinating, dysuria and hematuria.   Musculoskeletal: Positive for gait problem. Negative for arthralgias.   Skin: Positive for wound.   Neurological: Negative for dizziness and light-headedness.   Psychiatric/Behavioral: Negative for dysphoric mood. The patient is not nervous/anxious.        Objective:   /66   Pulse 62   Temp 98  F (36.7  C)   Resp 18   Wt 69.9 kg (154 lb)   SpO2 96%   BMI 24.12 kg/m    Physical Exam  Pleasant gentleman lying in bed no acute distress.  He is able to sit up in bed without assistance.  Alert and oriented x4.  Skin color pink.  Sclera nonicteric.  Mucous membranes moist.  Neck supple.  No adenopathy.  Lung fields clear to auscultation.  Cardiovascular regular.  Abdomen is soft and without tenderness or palpable masses.  Bilateral BKA.  He has  sock on right BKA.  He does have a dressing over the left AKA incision.  This is removed.  There is no drainage on the bandage.  There are still Steri-Strips that are loose and hanging from the incision. The Steri-Strips are removed.  Incision is pink, soft and nontender.  No necrosis eschar.  No drainage.    Assessment:    ICD-10-CM    1. Status post below-knee amputation of left lower extremity (H)  Z89.512    2. Status post below-knee amputation of right lower extremity (H)  Z89.511    3. Postoperative anemia  D64.9    4. Essential hypertension  I10    5. Type 2 diabetes mellitus with foot ulcer, with long-term current use of insulin (H)  E11.621     L97.509     Z79.4    6. Weight loss  R63.4        Plan:   Keep follow-up appointment with orthopedics and [facility] as currently scheduled.  Left BKA incision well approximated and without drainage.  I would  recommend keeping incision clean and covering with dressing every other day.  May cleanse with soap and water and rinse clear then gently pat dry then apply 5 x 9 ABD.  5 x 9 ABD and paper tape will be ordered at the time of discharge for him to have enough supplies until February 18, 2022.  Continue with this dressing change until he sees surgeon on February 18.  His mother whom he lives with has a PCA that he reports will be able to do his dressing change.  If he develops opening of the incision, redness, warmth, pain or drainage then he needs to report this to his surgeon.  Sliding board and wheelchair DME orders provided to patient.  [facility]  He does have a local PCP in [place], Dr. Marx.  Follow-up with [facility] physician or local PCP 2 weeks after SNF discharge.  He declined work-up for weight loss but I would recommend that he visit this with physician at follow-up appointment.  They will also need to review hypertension and diabetes and make adjustments as needed.  Would also recommend that he have repeat hemoglobin at time of follow-up to make sure anemia continues to improve.  He is no longer requiring oxycodone for pain.  That will be discontinued at the time of discharge.  He will continue on all other medications as currently prescribed.  Patient does not need outpatient therapy as he is currently independent with transfers and all ADLs.    EDILBERTO Rodriguez   2/3/2022  11:13 AM

## 2022-12-04 ENCOUNTER — HOSPITAL ENCOUNTER (EMERGENCY)
Facility: OTHER | Age: 67
Discharge: NURSING, REHAB OR LONG-TERM CARE | End: 2022-12-04
Attending: EMERGENCY MEDICINE | Admitting: EMERGENCY MEDICINE
Payer: COMMERCIAL

## 2022-12-04 VITALS
TEMPERATURE: 98.7 F | OXYGEN SATURATION: 98 % | HEART RATE: 77 BPM | SYSTOLIC BLOOD PRESSURE: 143 MMHG | HEIGHT: 67 IN | DIASTOLIC BLOOD PRESSURE: 77 MMHG | WEIGHT: 155 LBS | RESPIRATION RATE: 18 BRPM | BODY MASS INDEX: 24.33 KG/M2

## 2022-12-04 DIAGNOSIS — N39.0 URINARY TRACT INFECTION ASSOCIATED WITH INDWELLING URETHRAL CATHETER, INITIAL ENCOUNTER (H): ICD-10-CM

## 2022-12-04 DIAGNOSIS — T83.511A URINARY TRACT INFECTION ASSOCIATED WITH INDWELLING URETHRAL CATHETER, INITIAL ENCOUNTER (H): ICD-10-CM

## 2022-12-04 DIAGNOSIS — N48.9 PENILE LESION: ICD-10-CM

## 2022-12-04 LAB
ALBUMIN UR-MCNC: 200 MG/DL
APPEARANCE UR: ABNORMAL
BACTERIA #/AREA URNS HPF: ABNORMAL /HPF
BILIRUB UR QL STRIP: NEGATIVE
COLOR UR AUTO: YELLOW
GLUCOSE UR STRIP-MCNC: NEGATIVE MG/DL
HGB UR QL STRIP: ABNORMAL
KETONES UR STRIP-MCNC: NEGATIVE MG/DL
LEUKOCYTE ESTERASE UR QL STRIP: ABNORMAL
NITRATE UR QL: NEGATIVE
PH UR STRIP: 7 [PH] (ref 5–9)
RBC URINE: 69 /HPF
SP GR UR STRIP: 1.01 (ref 1–1.03)
UROBILINOGEN UR STRIP-MCNC: NORMAL MG/DL
WBC CLUMPS #/AREA URNS HPF: PRESENT /HPF
WBC URINE: >182 /HPF

## 2022-12-04 PROCEDURE — 99284 EMERGENCY DEPT VISIT MOD MDM: CPT | Performed by: EMERGENCY MEDICINE

## 2022-12-04 PROCEDURE — 81001 URINALYSIS AUTO W/SCOPE: CPT | Performed by: EMERGENCY MEDICINE

## 2022-12-04 PROCEDURE — 250N000013 HC RX MED GY IP 250 OP 250 PS 637: Performed by: EMERGENCY MEDICINE

## 2022-12-04 PROCEDURE — 250N000009 HC RX 250: Performed by: EMERGENCY MEDICINE

## 2022-12-04 PROCEDURE — 87086 URINE CULTURE/COLONY COUNT: CPT | Performed by: EMERGENCY MEDICINE

## 2022-12-04 PROCEDURE — 99283 EMERGENCY DEPT VISIT LOW MDM: CPT | Performed by: EMERGENCY MEDICINE

## 2022-12-04 RX ORDER — MUPIROCIN 20 MG/G
OINTMENT TOPICAL 3 TIMES DAILY
Qty: 1 G | Refills: 0 | Status: SHIPPED | OUTPATIENT
Start: 2022-12-04 | End: 2022-12-11

## 2022-12-04 RX ORDER — LIDOCAINE HYDROCHLORIDE 20 MG/ML
JELLY TOPICAL ONCE
Status: COMPLETED | OUTPATIENT
Start: 2022-12-04 | End: 2022-12-04

## 2022-12-04 RX ORDER — CEPHALEXIN 500 MG/1
500 CAPSULE ORAL 4 TIMES DAILY
Qty: 28 CAPSULE | Refills: 0 | Status: SHIPPED | OUTPATIENT
Start: 2022-12-04 | End: 2022-12-11

## 2022-12-04 RX ORDER — LIDOCAINE HYDROCHLORIDE 20 MG/ML
JELLY TOPICAL ONCE
Status: DISCONTINUED | OUTPATIENT
Start: 2022-12-04 | End: 2022-12-04 | Stop reason: CLARIF

## 2022-12-04 RX ORDER — LIDOCAINE HYDROCHLORIDE 20 MG/ML
JELLY TOPICAL ONCE
Status: DISCONTINUED | OUTPATIENT
Start: 2022-12-04 | End: 2022-12-04 | Stop reason: DRUGHIGH

## 2022-12-04 RX ADMIN — LIDOCAINE HYDROCHLORIDE: 20 JELLY TOPICAL at 02:51

## 2022-12-04 RX ADMIN — CEPHALEXIN 500 MG: 250 CAPSULE ORAL at 03:04

## 2022-12-04 ASSESSMENT — ACTIVITIES OF DAILY LIVING (ADL): ADLS_ACUITY_SCORE: 35

## 2022-12-04 NOTE — ED TRIAGE NOTES
Triage Assessment     Row Name 12/04/22 0155       Triage Assessment (Adult)    Airway WDL WDL    Additional Documentation Breath Sounds (Group)       Respiratory WDL    Respiratory WDL WDL       Breath Sounds    Breath Sounds All Fields    All Lung Fields Breath Sounds clear       Skin Circulation/Temperature WDL    Skin Circulation/Temperature WDL X

## 2022-12-04 NOTE — ED PROVIDER NOTES
History     Chief Complaint   Patient presents with     Catheter Problem     HPI  Adam Duarte is a 67 year old male who presents with catheter pain. Reports had catheter placed last week for urine retention, however this does not appear accurate as he had a matt on 11/10 per chart review. Reports pain around catheter every since it was placed. Reports pain is getting worse. Coming in from [facility]. Reports have been putting cream on area.  Reports he cleans the area once weekly.  Had vomiting yesterday. No nausea currently. No abdominal pain.     Nursing home does not know how long he has had catheter. Was there when he arrived on 11/25. Has order to follow up with urology for catheter discontinuation, has not been made yet. Placed for hydronephrosis during hospital admission 11/10, was admitted through 11/25.  Per chart review it was a difficult insertion and he did have a small lesion on his penis when it was inserted.  There was some concern that the catheter was too large for the meatus however they did not want to replace it as it was a difficult insertion.    Allergies:  No Known Allergies    Problem List:    Patient Active Problem List    Diagnosis Date Noted     Status post below-knee amputation of left lower extremity (H) 01/10/2022     Priority: Medium     Status post below-knee amputation of right lower extremity (H) 09/15/2021     Priority: Medium     Diabetic ulcer of right foot (H) 08/19/2021     Priority: Medium     S/P CABG (coronary artery bypass graft) 04/28/2015     Priority: Medium     Coronary atherosclerosis 12/30/2014     Priority: Medium     DMII (diabetes mellitus, type 2) (H) 10/18/2013     Priority: Medium     Essential hypertension 10/18/2013     Priority: Medium     Formatting of this note might be different from the original.  IMO Update       GERD (gastroesophageal reflux disease) 09/11/2013     Priority: Medium        Past Medical History:    Past Medical History:   Diagnosis  Date     Coronary atherosclerosis 12/30/2014     Diabetic ulcer of left foot (H) 8/19/2021     DMII (diabetes mellitus, type 2) (H) 10/18/2013     Essential hypertension 10/18/2013     GERD (gastroesophageal reflux disease) 9/11/2013     S/P CABG (coronary artery bypass graft) 4/28/2015       Past Surgical History:    Past Surgical History:   Procedure Laterality Date     AMPUTATE LEG BELOW KNEE Right 8/28/2021    Procedure: AMPUTATION, BELOW KNEE;  Surgeon: Yan Bear MD;  Location: [facility] OR     AS CABG, ARTERIAL, THREE         Family History:    Family History   Problem Relation Age of Onset     Coronary Artery Disease Father        Social History:  Marital Status:   [4]  Social History     Tobacco Use     Smoking status: Former     Smokeless tobacco: Never   Substance Use Topics     Alcohol use: Not Currently     Drug use: Not Currently        Medications:    acetaminophen (TYLENOL) 325 MG tablet  aspirin 81 MG EC tablet  atorvastatin (LIPITOR) 20 MG tablet  cephALEXin (KEFLEX) 500 MG capsule  clindamycin (CLEOCIN) 300 MG capsule  clopidogrel (PLAVIX) 75 MG tablet  cyanocobalamin (VITAMIN B-12) 1000 MCG tablet  cyanocobalamin (VITAMIN B-12) 500 MCG tablet  escitalopram (LEXAPRO) 20 MG tablet  insulin detemir (LEVEMIR PEN) 100 UNIT/ML pen  insulin lispro (HUMALOG KWIKPEN) 100 UNIT/ML (1 unit dial) KWIKPEN  lisinopril (ZESTRIL) 5 MG tablet  magnesium oxide (MAG-OX) 400 MG tablet  metFORMIN (GLUCOPHAGE-XR) 500 MG 24 hr tablet  metoprolol succinate ER (TOPROL-XL) 25 MG 24 hr tablet  Multiple Vitamin (MULTIVITAMIN ADULT PO)  mupirocin (BACTROBAN) 2 % external ointment  nitroGLYcerin (NITROSTAT) 0.4 MG sublingual tablet  omeprazole (PRILOSEC) 20 MG DR capsule  tamsulosin (FLOMAX) 0.4 MG capsule  UNABLE TO FIND  UNABLE TO FIND  VITAMIN  B COMPLEX tablet  vitamin B-Complex  insulin detemir (LEVEMIR PEN) 100 UNIT/ML pen          Review of Systems  Please seen HPI for pertinent positives and negatives. All other  "systems reviewed and found to be negative.   Physical Exam   BP: (!) 143/77  Pulse: 64  Temp: 98.7  F (37.1  C)  Resp: 18  Height: 170.2 cm (5' 7\")  Weight: 70.3 kg (155 lb)  SpO2: 98 %      Physical Exam  Constitutional:       General: He is not in acute distress.     Appearance: He is not ill-appearing.   HENT:      Head: Normocephalic and atraumatic.      Nose: Nose normal.      Mouth/Throat:      Mouth: Mucous membranes are moist.      Pharynx: Oropharynx is clear.   Eyes:      Conjunctiva/sclera: Conjunctivae normal.      Pupils: Pupils are equal, round, and reactive to light.   Cardiovascular:      Rate and Rhythm: Normal rate and regular rhythm.   Abdominal:      Palpations: Abdomen is soft.      Tenderness: There is no abdominal tenderness.   Genitourinary:     Comments: Small lesion at 6:00 on urethral meatus.  Some purulent discharge coming from urethral meatus.  Musculoskeletal:      Cervical back: Normal range of motion.      Comments: Status post bilateral BKA   Skin:     General: Skin is warm and dry.   Neurological:      Mental Status: He is alert and oriented to person, place, and time.         ED Course              ED Course as of 12/04/22 0301   Sun Dec 04, 2022   0300 Urinalysis consistent with infection.  We will also prescribe Bactroban to apply to lesions 3 times daily.  Emphasized need to follow-up closely with urology and provided multiple numbers.  To follow-up early this week.  Return precautions discussed as detailed in AVS. Patient expressed understanding.  Explicit instructions were provided for the nursing home.     Procedures              Critical Care time:  none               Results for orders placed or performed during the hospital encounter of 12/04/22 (from the past 24 hour(s))   UA with Microscopic reflex to Culture    Specimen: Urine, Catheter   Result Value Ref Range    Color Urine Yellow Colorless, Straw, Light Yellow, Yellow    Appearance Urine Cloudy (A) Clear    Glucose " Urine Negative Negative mg/dL    Bilirubin Urine Negative Negative    Ketones Urine Negative Negative mg/dL    Specific Gravity Urine 1.013 1.000 - 1.030    Blood Urine Moderate (A) Negative    pH Urine 7.0 5.0 - 9.0    Protein Albumin Urine 200 (A) Negative mg/dL    Urobilinogen Urine Normal Normal, 2.0 mg/dL    Nitrite Urine Negative Negative    Leukocyte Esterase Urine Large (A) Negative    Bacteria Urine Few (A) None Seen /HPF    WBC Clumps Urine Present (A) None Seen /HPF    RBC Urine 69 (H) <=2 /HPF    WBC Urine >182 (H) <=5 /HPF    Narrative    Urine Culture ordered based on laboratory criteria       Medications   cephALEXin (KEFLEX) capsule 500 mg (has no administration in time range)   lidocaine (XYLOCAINE) 2 % external gel ( Topical Given 12/4/22 0251)       Assessments & Plan (with Medical Decision Making)     I have reviewed the nursing notes.    I have reviewed the findings, diagnosis, plan and need for follow up with the patient.    Mr Duarte is a 67-year-old man who presents with pain around his Hale catheter.  It appears that this was placed for urine retention/hydronephrosis, also had some pyelonephritis during hospital admission last month.  He is meant to follow-up with urology for removal however this has not been scheduled yet by his nursing home.  There is a small lesion which appears has been there since the Hale insertion, also has some purulent drainage surrounding the Hale catheter today.  We will send urinalysis after clamping Hale.  We will also apply lidocaine around the painful area and prescribe mupirocin.  Emphasized to nursing home staff the need for him to follow-up closely with urology.  As this was a difficult insertion we will not remove the Hale today for trial of void.    New Prescriptions    CEPHALEXIN (KEFLEX) 500 MG CAPSULE    Take 1 capsule (500 mg) by mouth 4 times daily for 7 days    MUPIROCIN (BACTROBAN) 2 % EXTERNAL OINTMENT    Apply topically 3 times daily for 7  days       Final diagnoses:   Penile lesion       12/4/2022   [facility] AND [facility]     Aleisha Anderson MD  12/04/22 0800

## 2022-12-04 NOTE — ED TRIAGE NOTES
Bharathi JAFFE from [facility] called to say they were sending the pt in for catheter issues.  Pt has had a matt catheter for the past 2 weeks and has been having persistent pain with it.  The past few days have been the worst for the pt and Bharathi JAFFE states that they tried deflating the balloon to try and see if it helped.  This did not help.  Bharathi reports good urine output and no blood in urine.

## 2022-12-04 NOTE — DISCHARGE INSTRUCTIONS
It is important to perform personal hygiene at least every other day.  Keep your genital area clean with soap and water.  Use Bactroban as directed and barrier cream daily after cleaning to protect the sore area  Take entire course of antibiotics even if improving  Is extremely important to follow-up with urology early this week.  Call first thing on Monday to schedule.  Return to the emergency department for worsening pain, vomiting, fever, inability to pass urine, or if you have any new or changing symptoms or concerns.

## 2022-12-05 NOTE — RESULT ENCOUNTER NOTE
[facility] Emergency Dept discharge antibiotic (if prescribed): Cephalexin (Keflex) 500 mg capsule, 1 capsule (500 mg) by mouth 4 times daily for 7 days.   Date of Rx (if applicable):  12/4/22  No changes in treatment per [facility] ED Lab Result Urine culture protocol.

## 2022-12-06 ENCOUNTER — TELEPHONE (OUTPATIENT)
Dept: EMERGENCY MEDICINE | Facility: OTHER | Age: 67
End: 2022-12-06

## 2022-12-06 LAB — BACTERIA UR CULT: ABNORMAL

## 2022-12-06 NOTE — TELEPHONE ENCOUNTER
[facility] Emergency Department Lab result notification    [facility] ED lab result protocol used  Urine culture    Reason for call  Notify of lab results, assess symptoms,  review ED providers recommendations/discharge instructions (if necessary) and advise per ED lab result f/u protocol    Lab Result (including Rx patient on, if applicable)  Final Urine Culture Report on 12/6/22  [facility] Emergency Dept discharge antibiotic prescribed: Cephalexin (Keflex) 500 mg capsule, 1 capsule (500 mg) by mouth 4 times daily for 7 days.  #1. Bacteria, >100,000 CFU/mL Enterobacter cloacae,  is [RESISTANT] to antibiotic.   Recommendations in treatment per [facility] ED lab result Urine Culture protocol.    Information table from Emergency Dept Provider visit on 12/4/22  Symptoms reported at ED visit (Chief complaint, HPI) Catheter Problem      HPI  Adam Duarte is a 67 year old male who presents with catheter pain. Reports had catheter placed last week for urine retention, however this does not appear accurate as he had a matt on 11/10 per chart review. Reports pain around catheter every since it was placed. Reports pain is getting worse. Coming in from [facility]. Reports have been putting cream on area.  Reports he cleans the area once weekly.  Had vomiting yesterday. No nausea currently. No abdominal pain.      Nursing home does not know how long he has had catheter. Was there when he arrived on 11/25. Has order to follow up with urology for catheter discontinuation, has not been made yet. Placed for hydronephrosis during hospital admission 11/10, was admitted through 11/25.  Per chart review it was a difficult insertion and he did have a small lesion on his penis when it was inserted.  There was some concern that the catheter was too large for the meatus however they did not want to replace it as it was a difficult insertion.     Significant Medical hx, if applicable (i.e. CKD, diabetes)  Reviewed   Allergies No Known Allergies   Weight, if applicable Wt Readings from Last 2 Encounters:   12/04/22 70.3 kg (155 lb)   02/03/22 69.9 kg (154 lb)      Coumadin/Warfarin [Yes /No] No   Creatinine Level (mg/dl) Creatinine   Date Value Ref Range Status   02/01/2022 0.75 0.70 - 1.30 mg/dL Final      Creatinine clearance (ml/min), if applicable Creatinine clearance cannot be calculated (Patient's most recent lab result is older than the maximum 30 days allowed.) CrCl est 97.65   ED providers Impression and Plan (applicable information) Mr Duarte is a 67-year-old man who presents with pain around his Hale catheter.  It appears that this was placed for urine retention/hydronephrosis, also had some pyelonephritis during hospital admission last month.  He is meant to follow-up with urology for removal however this has not been scheduled yet by his nursing home.  There is a small lesion which appears has been there since the Hale insertion, also has some purulent drainage surrounding the Hale catheter today.  We will send urinalysis after clamping Hale.  We will also apply lidocaine around the painful area and prescribe mupirocin.  Emphasized to nursing home staff the need for him to follow-up closely with urology.  As this was a difficult insertion we will not remove the Hale today for trial of void.    Urinalysis consistent with infection.  We will also prescribe Bactroban to apply to lesions 3 times daily.  Emphasized need to follow-up closely with urology and provided multiple numbers.  To follow-up early this week.  Return precautions discussed as detailed in AVS. Patient expressed understanding.  Explicit instructions were provided for the nursing home.   ED diagnosis Penile lesion   ED provider Aleisha Anderson MD       RN Assessment (Patient s current Symptoms), include time called.  [Insert Left message here if message left]  11:07AM: Spoke with patient's LTC facility nurse Gaye.     RN  Recommendations/Instructions per [facility] ED lab result protocol  Patient's facility nurse Gaye notified of lab result  Final result faxed to [facility] facility.  Fax; [phone]  Attn: Gaye Huizar will consult with the patient's facility provider to determine any change in treatment.     Please Contact your PCP clinic or return to the Emergency department if your:    Symptoms worsen or other concerning symptom's.    PCP follow-up Questions asked: YES       Anusha Molina RN  [facility] [place]  Emergency Dept Lab Result RN  # [phone]     Copy of Lab result   Urine Culture  Order: [id number]   Collected 12/4/2022  2:20 AM      Status: Final result      Visible to patient: No (inaccessible in MyChart)     Specimen Information: Urine, Catheter    2 Result Notes  Culture >100,000 CFU/mL Enterobacter cloacae Abnormal             Resulting Agency: [facility]     Susceptibility     Enterobacter cloacae     TRACY     Amoxicillin/Clavulanate >16  Resistant     Ampicillin >16  Resistant     Ampicillin/ Sulbactam >16  Resistant     Aztreonam 16  Resistant     Cefazolin >16  Resistant     Cefepime <=2  Susceptible     Cefoxitin >16  Resistant     Cefpodoxime >4  Resistant     Ceftazidime >16  Resistant     Ceftriaxone >32  Resistant     Cefuroxime >16  Resistant     Ciprofloxacin  See below 1     Ertapenem <=0.5  Susceptible     Gentamicin <=4  Susceptible     Imipenem <=1  Susceptible     Levofloxacin  See below 2     Nitrofurantoin <=32  Susceptible     Tetracycline <=4  Susceptible     Tobramycin <=4  Susceptible     Trimethoprim <=8  Susceptible     Trimethoprim/Sulfamethoxazole <=2/38  Susceptible              1 Ciprofloxacin not resistant.  Due to test limitations, lab cannot provide TRACY to determine susceptibility.   2 Levofloxacin not resistant.  Due to test limitations, lab cannot provide TRACY to determine susceptibility.            Specimen Collected: 12/04/22  2:20 AM Last  Resulted: 12/06/22  7:23 AM

## 2022-12-08 ENCOUNTER — NURSING HOME VISIT (OUTPATIENT)
Dept: GERIATRICS | Facility: OTHER | Age: 67
End: 2022-12-08
Payer: COMMERCIAL

## 2022-12-08 VITALS
HEART RATE: 68 BPM | WEIGHT: 157 LBS | SYSTOLIC BLOOD PRESSURE: 133 MMHG | RESPIRATION RATE: 18 BRPM | TEMPERATURE: 97 F | DIASTOLIC BLOOD PRESSURE: 80 MMHG | BODY MASS INDEX: 24.59 KG/M2

## 2022-12-08 DIAGNOSIS — N48.9 PENILE LESION: ICD-10-CM

## 2022-12-08 DIAGNOSIS — N39.0 URINARY TRACT INFECTION ASSOCIATED WITH INDWELLING URETHRAL CATHETER, SUBSEQUENT ENCOUNTER: Primary | ICD-10-CM

## 2022-12-08 DIAGNOSIS — E11.621 TYPE 2 DIABETES MELLITUS WITH FOOT ULCER, WITH LONG-TERM CURRENT USE OF INSULIN (H): ICD-10-CM

## 2022-12-08 DIAGNOSIS — T83.511D URINARY TRACT INFECTION ASSOCIATED WITH INDWELLING URETHRAL CATHETER, SUBSEQUENT ENCOUNTER: Primary | ICD-10-CM

## 2022-12-08 DIAGNOSIS — E16.2 HYPOGLYCEMIA: ICD-10-CM

## 2022-12-08 DIAGNOSIS — Z79.4 TYPE 2 DIABETES MELLITUS WITH FOOT ULCER, WITH LONG-TERM CURRENT USE OF INSULIN (H): ICD-10-CM

## 2022-12-08 DIAGNOSIS — L97.509 TYPE 2 DIABETES MELLITUS WITH FOOT ULCER, WITH LONG-TERM CURRENT USE OF INSULIN (H): ICD-10-CM

## 2022-12-08 PROBLEM — D64.9 ANEMIA: Status: ACTIVE | Noted: 2018-07-23

## 2022-12-08 PROBLEM — I50.41 ACUTE COMBINED SYSTOLIC AND DIASTOLIC HEART FAILURE (H): Status: ACTIVE | Noted: 2022-11-14

## 2022-12-08 PROBLEM — K75.9 INFLAMMATORY LIVER DISEASE: Status: ACTIVE | Noted: 2022-11-14

## 2022-12-08 PROBLEM — S88.119A: Status: ACTIVE | Noted: 2021-12-17

## 2022-12-08 PROBLEM — E11.42 DIABETIC POLYNEUROPATHY ASSOCIATED WITH TYPE 2 DIABETES MELLITUS (H): Status: ACTIVE | Noted: 2021-12-17

## 2022-12-08 PROBLEM — I48.0 PAROXYSMAL ATRIAL FIBRILLATION (H): Status: ACTIVE | Noted: 2022-11-13

## 2022-12-08 PROBLEM — I21.4 NSTEMI (NON-ST ELEVATED MYOCARDIAL INFARCTION) (H): Status: ACTIVE | Noted: 2020-09-20

## 2022-12-08 PROCEDURE — 99310 SBSQ NF CARE HIGH MDM 45: CPT | Performed by: NURSE PRACTITIONER

## 2022-12-08 ASSESSMENT — ENCOUNTER SYMPTOMS
WOUND: 1
FEVER: 0
DYSURIA: 1
DIFFICULTY URINATING: 1
SHORTNESS OF BREATH: 0

## 2022-12-08 NOTE — PROGRESS NOTES
Subjective:  Patient is seen today to follow-up urinary tract infection.  He was evaluated in the emergency department on December 4, 2022.  He was having pain at catheter site.  Catheter had been placed at previous hospitalization at [facility] secondary to urinary retention.  At the time of catheter placement it was noted that he had a penile lesion.  This was on November 10, 2022.  By reviewing [facility] lab records I am not seeing that any orders were placed for VDRL.  It was recommended by ED physician to start bacitracin to the lesion.  The catheter was not removed as it was a difficult placement.  He was started on Keflex for treatment of urinary tract infection.  Urine culture has returned and I am asked to review as bacteria not sensitive to all cephalosporins.  Urine culture grew Enterobacter clocae with multidrug resistance.  Patient has no known drug allergies.  Last creatinine 1.16 and GFR 70 on November 25, 2022.  By reviewing patient's chart he has also had episodes of hypoglycemia.  He currently receives insulin detemir 17 units twice daily.  This was decreased while he was at [facility] secondary to hypoglycemia.  Sugars at skilled nursing facility ranged from  with 2 low readings of 52 and 68.    Patient Active Problem List   Diagnosis     Diabetic ulcer of right foot (H)     Coronary atherosclerosis     DMII (diabetes mellitus, type 2) (H)     Essential hypertension     GERD (gastroesophageal reflux disease)     S/P CABG (coronary artery bypass graft)     Status post below-knee amputation of right lower extremity (H)     Status post below-knee amputation of left lower extremity (H)     NSTEMI (non-ST elevated myocardial infarction) (H)     Acute combined systolic and diastolic heart failure (H)     Anemia     Below-knee amputation (H)     Diabetic polyneuropathy associated with type 2 diabetes mellitus (H)     Inflammatory liver disease     Paroxysmal atrial  fibrillation (H)     Past Medical History:   Diagnosis Date     Coronary atherosclerosis 12/30/2014     Diabetic ulcer of left foot (H) 8/19/2021     DMII (diabetes mellitus, type 2) (H) 10/18/2013     Essential hypertension 10/18/2013    Formatting of this note might be different from the original. IMO Update     GERD (gastroesophageal reflux disease) 9/11/2013     S/P CABG (coronary artery bypass graft) 4/28/2015     Past Surgical History:   Procedure Laterality Date     AMPUTATE LEG BELOW KNEE Right 8/28/2021    Procedure: AMPUTATION, BELOW KNEE;  Surgeon: Yan Bear MD;  Location:  OR     AS CABG, ARTERIAL, THREE       Social History     Socioeconomic History     Marital status:      Spouse name: Not on file     Number of children: Not on file     Years of education: Not on file     Highest education level: Not on file   Occupational History     Not on file   Tobacco Use     Smoking status: Former     Smokeless tobacco: Never   Substance and Sexual Activity     Alcohol use: Not Currently     Drug use: Not Currently     Sexual activity: Not Currently   Other Topics Concern     Not on file   Social History Narrative     Not on file     Social Determinants of Health     Financial Resource Strain: Not on file   Food Insecurity: Not on file   Transportation Needs: Not on file   Physical Activity: Not on file   Stress: Not on file   Social Connections: Not on file   Intimate Partner Violence: Not on file   Housing Stability: Not on file     Family History   Problem Relation Age of Onset     Coronary Artery Disease Father      Patient has no known allergies.    Skilled Nursing Facility Medication Record reviewed    End of Life Planning:  Full Code    Review of Systems:  Review of Systems   Constitutional: Negative for fever.   Respiratory: Negative for shortness of breath.    Cardiovascular: Negative for chest pain.   Genitourinary: Positive for difficulty urinating, dysuria, genital sores and penile  pain. Negative for penile discharge and penile swelling.   Skin: Positive for wound.       Objective:   /80   Pulse 68   Temp 97  F (36.1  C)   Resp 18   Wt 71.2 kg (157 lb)   BMI 24.59 kg/m    Physical Exam  Pleasant gentleman sitting in wheelchair no acute distress.  Skin color pink.  Mucous membranes moist.  Lung fields clear to auscultation.  Cardiovascular regular, no S3.  Abdomen is without tenderness or palpable masses.  Hale catheter in place and draining freely.  There is a ulceration surrounding the urethral meatus that has scant slough and pink wound bed.    Assessment:    ICD-10-CM    1. Urinary tract infection associated with indwelling urethral catheter, subsequent encounter  T83.511D     N39.0       2. Penile lesion  N48.9       3. Type 2 diabetes mellitus with foot ulcer, with long-term current use of insulin (H)  E11.621     L97.509     Z79.4       4. Hypoglycemia  E16.2           Plan:   Discontinue Keflex.  Start nitrofurantoin 50 mg 4 times daily for 7 days.  It is reported in hospital notes that penile lesion was present before catheter insertion.  Check VDRL and HIV labs due to penile lesion.  Patient is supposed to have an appointment with urology for urinary retention per hospital discharge summary.  Decrease insulin detemir to 14 units twice daily.  Follow-up if hypoglycemia continues.  Based on MAR patient is on Plavix 75 mg daily, aspirin 81 mg daily and at recent hospitalization he was started on apixaban 5 mg twice daily for atrial fibrillation.  Patient has previous history of iron deficiency anemia.  This combination of medications increases patient's risk significantly for acute bleed.  I would recommend nursing staff contact discharging  physician to confirmed whether patient was supposed to be discharged on all 3 agents. Continue omeprazole. I also would recommend that he be set up with [facility] cardiology as this was recommended in hospital discharge summary.    Lisa  EDILBERTO Lai   12/8/2022   12:20 PM

## 2022-12-12 ENCOUNTER — OFFICE VISIT (OUTPATIENT)
Dept: UROLOGY | Facility: OTHER | Age: 67
End: 2022-12-12
Attending: UROLOGY
Payer: COMMERCIAL

## 2022-12-12 ENCOUNTER — LAB REQUISITION (OUTPATIENT)
Dept: LAB | Facility: OTHER | Age: 67
End: 2022-12-12
Payer: COMMERCIAL

## 2022-12-12 VITALS
SYSTOLIC BLOOD PRESSURE: 136 MMHG | HEART RATE: 58 BPM | RESPIRATION RATE: 16 BRPM | DIASTOLIC BLOOD PRESSURE: 80 MMHG | OXYGEN SATURATION: 99 %

## 2022-12-12 DIAGNOSIS — N40.1 URINARY RETENTION DUE TO BENIGN PROSTATIC HYPERPLASIA: Primary | ICD-10-CM

## 2022-12-12 DIAGNOSIS — N48.9 DISORDER OF PENIS, UNSPECIFIED: ICD-10-CM

## 2022-12-12 DIAGNOSIS — E11.42 DIABETIC POLYNEUROPATHY ASSOCIATED WITH TYPE 2 DIABETES MELLITUS (H): ICD-10-CM

## 2022-12-12 DIAGNOSIS — R33.8 URINARY RETENTION DUE TO BENIGN PROSTATIC HYPERPLASIA: Primary | ICD-10-CM

## 2022-12-12 PROCEDURE — 99204 OFFICE O/P NEW MOD 45 MIN: CPT | Mod: 25 | Performed by: UROLOGY

## 2022-12-12 PROCEDURE — 87389 HIV-1 AG W/HIV-1&-2 AB AG IA: CPT | Performed by: NURSE PRACTITIONER

## 2022-12-12 PROCEDURE — 51700 IRRIGATION OF BLADDER: CPT | Performed by: UROLOGY

## 2022-12-12 PROCEDURE — G0463 HOSPITAL OUTPT CLINIC VISIT: HCPCS | Mod: 25

## 2022-12-12 PROCEDURE — 86780 TREPONEMA PALLIDUM: CPT | Performed by: NURSE PRACTITIONER

## 2022-12-12 PROCEDURE — 51798 US URINE CAPACITY MEASURE: CPT | Performed by: UROLOGY

## 2022-12-12 ASSESSMENT — PAIN SCALES - GENERAL: PAINLEVEL: NO PAIN (0)

## 2022-12-12 NOTE — NURSING NOTE
Chief Complaint   Patient presents with     Consult     Urinary retention     Procedure     Void Trial    patient presents to the clinic today for a consult for urinary retention and a procedure for a Void trial    Review of Systems:    Weight loss:    Yes     Recent fever/chills:  No   Night sweats:   Yes  Current skin rash:  No   Recent hair loss:  No  Heat intolerance:  No   Cold intolerance:  No  Chest pain:   No   Palpitations:   No  Shortness of breath:  Yes   Wheezing:   No  Constipation:    Yes   Diarrhea:   Yes   Nausea:   Yes   Vomiting:   Yes   Kidney/side pain:  No   Back pain:   No  Frequent headaches:  Yes   Dizziness:     Yes  Leg swelling:   No   Calf pain:    No    Parents, brothers or sisters with history of kidney cancer:   No  Parents, brothers or sisters with history of bladder cancer: No  Void Trial  Verbal order read back by Shaun Cai MD to perform void trial and post void residual bladder scan.  Patient's bladder was filled under gravity with 300mL of sterile saline.  Patient voided 200mL.  Post-void residual was measured by ultrasonic bladder scanner: 246 mL      Medication Reconciliation: completed   Loni Martinez LPN  12/12/2022 8:03 AM

## 2022-12-12 NOTE — PROGRESS NOTES
Type of Visit  NPV    Chief Complaint  Urinary retention    HPI  Mr Duarte is a 67 year old male who presents with urinary retention.  He presented to the ED with symptoms of pelvic fullness and a catheter was placed.  Catheter was placed 1 month ago.  He has had catheters placed in the past.  He has not previously had prostate surgery.    He is currently on the following medications for urinary complaints: Flomax 0.4mg daily  He does not take any anticholinergic medications for urinary symptoms.    Recurrent bladder infections  No  Incontinence    No  Constipation    No      Past Medical History  He  has a past medical history of Coronary atherosclerosis (12/30/2014), Diabetic ulcer of left foot (H) (8/19/2021), DMII (diabetes mellitus, type 2) (H) (10/18/2013), Essential hypertension (10/18/2013), GERD (gastroesophageal reflux disease) (9/11/2013), and S/P CABG (coronary artery bypass graft) (4/28/2015).  Patient Active Problem List   Diagnosis     Diabetic ulcer of right foot (H)     Coronary atherosclerosis     DMII (diabetes mellitus, type 2) (H)     Essential hypertension     GERD (gastroesophageal reflux disease)     S/P CABG (coronary artery bypass graft)     Status post below-knee amputation of right lower extremity (H)     Status post below-knee amputation of left lower extremity (H)     NSTEMI (non-ST elevated myocardial infarction) (H)     Acute combined systolic and diastolic heart failure (H)     Anemia     Below-knee amputation (H)     Diabetic polyneuropathy associated with type 2 diabetes mellitus (H)     Inflammatory liver disease     Paroxysmal atrial fibrillation (H)       Past Surgical History  He  has a past surgical history that includes CABG, ARTERIAL, THREE and Amputate leg below knee (Right, 8/28/2021).    Medications  He has a current medication list which includes the following prescription(s): acetaminophen, aspirin, atorvastatin, clindamycin, clopidogrel, cyanocobalamin,  cyanocobalamin, escitalopram, insulin detemir, insulin detemir, insulin lispro, lisinopril, magnesium oxide, metformin, metoprolol succinate er, multiple vitamin, nitroglycerin, omeprazole, tamsulosin, UNABLE TO FIND, UNABLE TO FIND, vitamin  b complex, and vitamin b-complex.    Allergies  No Known Allergies    Social History  He  reports that he has quit smoking. He has never used smokeless tobacco. He reports that he does not currently use alcohol. He reports that he does not currently use drugs.  No drug abuse.    Family History  Family History   Problem Relation Age of Onset     Coronary Artery Disease Father        Review of Systems  I personally reviewed the ROS with the patient.    Nursing Notes:   Loni Martinez LPN  12/12/2022  8:45 AM  Addendum  Chief Complaint   Patient presents with     Consult     Urinary retention     Procedure     Void Trial    patient presents to the clinic today for a consult for urinary retention and a procedure for a Void trial    Review of Systems:    Weight loss:    Yes     Recent fever/chills:  No   Night sweats:   Yes  Current skin rash:  No   Recent hair loss:  No  Heat intolerance:  No   Cold intolerance:  No  Chest pain:   No   Palpitations:   No  Shortness of breath:  Yes   Wheezing:   No  Constipation:    Yes   Diarrhea:   Yes   Nausea:   Yes   Vomiting:   Yes   Kidney/side pain:  No   Back pain:   No  Frequent headaches:  Yes   Dizziness:     Yes  Leg swelling:   No   Calf pain:    No    Parents, brothers or sisters with history of kidney cancer:   No  Parents, brothers or sisters with history of bladder cancer: No  Void Trial  Verbal order read back by Shaun Cai MD to perform void trial and post void residual bladder scan.  Patient's bladder was filled under gravity with 300mL of sterile saline.  Patient voided 200mL.  Post-void residual was measured by ultrasonic bladder scanner: 246 mL      Medication Reconciliation: completed   Loni Martinez LPN  12/12/2022 8:03  AM       Physical Exam  Vitals:    12/12/22 0807   BP: 136/80   BP Location: Right arm   Patient Position: Sitting   Cuff Size: Adult Regular   Pulse: 58   Resp: 16   SpO2: 99%     Constitutional: No acute distress.  Alert and cooperative   Head: NCAT  Eyes: Conjunctivae normal  Cardiovascular: Regular rate.  Pulmonary/Chest: Respirations are even and non-labored bilaterally, no audible wheezing  Abdominal: Soft. No distension, tenderness, masses or guarding.   Neurological: A + O x 3.  Cranial Nerves II-XII grossly intact.  Extremities: ALONSO x 4, Warm. No clubbing.  No cyanosis.    Skin: Pink, warm and dry.  No visible rashes noted.  Psychiatric:  Normal mood and affect  Back:  No left CVA tenderness.  No right CVA tenderness.  Genitourinary:  Catheter in place draining clear urine    Labs   12/04/22 02:20   Color Urine Yellow   Appearance Urine Cloudy !   Glucose Urine Negative   Bilirubin Urine Negative   Ketones Urine Negative   Specific Gravity Urine 1.013   pH Urine 7.0   Protein Albumin Urine 200 !   Urobilinogen mg/dL Normal   Nitrite Urine Negative   Blood Urine Moderate !   Leukocyte Esterase Urine Large !   WBC Urine >182 (H)   RBC Urine 69 (H)   Bacteria Urine Few !   WBC Clumps Present !      02/01/22 10:25   Sodium 139   Potassium 3.8   Chloride 105   Carbon Dioxide 27   Urea Nitrogen 17   Creatinine 0.75   GFR Estimate >90   Calcium 9.4   Anion Gap 7     Imaging  I personally reviewed and interpreted the images and report.  CT a/p   8/19/2021  IMPRESSION:  No flow-limiting stenosis of the aorta through the popliteal arteries.     3 vessel runoff to the right foot complicated by high-grade narrowing  of the anterior tibial and peroneal arteries.      Two-vessel runoff to the left foot, with occlusion of the left  anterior tibial artery and multifocal peroneal artery high-grade  stenoses.    Post-Void Residual  A post-void residual was measured by ultrasonic bladder scanner.  246 mL  today    Assessment  Mr. Duarte is a 67 year old male who presents with urinary retention requiring catheter placement.  Reviewed the past notes labs and imaging above.  The patient passed his void trial today.  I did recommend follow-up however given at the time of diagnosis he had developed renal failure.    Plan  Continue Flomax for BPH  Follow-up in the next week or 2 for a PVR to assure he continues to empty well.

## 2022-12-13 LAB
HIV 1+2 AB+HIV1 P24 AG SERPL QL IA: NONREACTIVE
T PALLIDUM AB SER QL: NONREACTIVE

## 2022-12-15 ENCOUNTER — OFFICE VISIT (OUTPATIENT)
Dept: FAMILY MEDICINE | Facility: OTHER | Age: 67
End: 2022-12-15
Attending: FAMILY MEDICINE
Payer: COMMERCIAL

## 2022-12-15 VITALS
RESPIRATION RATE: 20 BRPM | OXYGEN SATURATION: 99 % | SYSTOLIC BLOOD PRESSURE: 148 MMHG | TEMPERATURE: 97.3 F | HEART RATE: 56 BPM | DIASTOLIC BLOOD PRESSURE: 98 MMHG

## 2022-12-15 DIAGNOSIS — N12 PYELONEPHRITIS: ICD-10-CM

## 2022-12-15 DIAGNOSIS — E11.621 TYPE 2 DIABETES MELLITUS WITH FOOT ULCER, WITH LONG-TERM CURRENT USE OF INSULIN (H): Primary | ICD-10-CM

## 2022-12-15 DIAGNOSIS — L97.509 TYPE 2 DIABETES MELLITUS WITH FOOT ULCER, WITH LONG-TERM CURRENT USE OF INSULIN (H): Primary | ICD-10-CM

## 2022-12-15 DIAGNOSIS — Z79.4 TYPE 2 DIABETES MELLITUS WITH FOOT ULCER, WITH LONG-TERM CURRENT USE OF INSULIN (H): Primary | ICD-10-CM

## 2022-12-15 PROCEDURE — G0463 HOSPITAL OUTPT CLINIC VISIT: HCPCS | Performed by: FAMILY MEDICINE

## 2022-12-15 PROCEDURE — 99213 OFFICE O/P EST LOW 20 MIN: CPT | Performed by: FAMILY MEDICINE

## 2022-12-15 ASSESSMENT — PAIN SCALES - GENERAL: PAINLEVEL: NO PAIN (0)

## 2022-12-15 NOTE — NURSING NOTE
Patient here for discharge orders from the [facility] back to his home in [facility]. He needs outpatient PT/OT orders and will be leaving 12/16/22. He lives with his daughter and grandchildren. Medication Reconciliation: complete.  /  Marianna Del Valle LPN  12/15/2022 3:28 PM

## 2022-12-15 NOTE — PROGRESS NOTES
SUBJECTIVE:   Adam Duarte is a 67 year old male who presents to clinic today for the following health issues: Discharge orders    Patient arrives here for discharge from [facility].  He was sent to [facility] after hospitalization due to weakness.  Pyelonephritis.  He has met their goals and is being discharged back home.  To [facility].  Lives with his daughter and grandchild.  Patient currently has no complaints or concerns      Thank you  Patient Active Problem List    Diagnosis Date Noted     Pyelonephritis 12/15/2022     Priority: Medium     Acute combined systolic and diastolic heart failure (H) 11/14/2022     Priority: Medium     Inflammatory liver disease 11/14/2022     Priority: Medium     Paroxysmal atrial fibrillation (H) 11/13/2022     Priority: Medium     Status post below-knee amputation of left lower extremity (H) 01/10/2022     Priority: Medium     Below-knee amputation (H) 12/17/2021     Priority: Medium     Diabetic polyneuropathy associated with type 2 diabetes mellitus (H) 12/17/2021     Priority: Medium     Status post below-knee amputation of right lower extremity (H) 09/15/2021     Priority: Medium     Diabetic ulcer of right foot (H) 08/19/2021     Priority: Medium     NSTEMI (non-ST elevated myocardial infarction) (H) 09/20/2020     Priority: Medium     Formatting of this note might be different from the original.  Added automatically from request for surgery [id number]       Anemia 07/23/2018     Priority: Medium     S/P CABG (coronary artery bypass graft) 04/28/2015     Priority: Medium     Coronary atherosclerosis 12/30/2014     Priority: Medium     DMII (diabetes mellitus, type 2) (H) 10/18/2013     Priority: Medium     Essential hypertension 10/18/2013     Priority: Medium     Formatting of this note might be different from the original.  IMO Update       GERD (gastroesophageal reflux disease) 09/11/2013     Priority: Medium     Past Medical History:   Diagnosis Date     Coronary  atherosclerosis 12/30/2014     Diabetic ulcer of left foot (H) 8/19/2021     DMII (diabetes mellitus, type 2) (H) 10/18/2013     Essential hypertension 10/18/2013    Formatting of this note might be different from the original. IMO Update     GERD (gastroesophageal reflux disease) 9/11/2013     S/P CABG (coronary artery bypass graft) 4/28/2015      Past Surgical History:   Procedure Laterality Date     AMPUTATE LEG BELOW KNEE Right 8/28/2021    Procedure: AMPUTATION, BELOW KNEE;  Surgeon: Yan Bear MD;  Location: [facility] OR     AS CABG, ARTERIAL, THREE         Review of Systems     OBJECTIVE:     BP (!) 180/102 (BP Location: Right arm)   Pulse 56   Temp 97.3  F (36.3  C)   Resp 20   SpO2 99%   There is no height or weight on file to calculate BMI.  Physical Exam  Cardiovascular:      Rate and Rhythm: Normal rate and regular rhythm.   Pulmonary:      Effort: Pulmonary effort is normal.      Breath sounds: Normal breath sounds.   Abdominal:      General: Abdomen is flat. There is no distension.   Musculoskeletal:      Comments: Bilateral BKA.   Neurological:      Mental Status: He is alert.         Diagnostic Test Results:  none     ASSESSMENT/PLAN:         (E11.621,  L97.509,  Z79.4) Type 2 diabetes mellitus with foot ulcer, with long-term current use of insulin (H)  (primary encounter diagnosis)  Comment:   Plan:     (N12) Pyelonephritis  Comment:   Plan:     Weakness.  Patient is back to baseline.  Discharge order signed.    Recommend following up with his PCP within 1 to 2 weeks  Tre Irby MD  [facility]

## 2023-03-24 ENCOUNTER — TRANSFERRED RECORDS (OUTPATIENT)
Dept: MULTI SPECIALTY CLINIC | Facility: CLINIC | Age: 68
End: 2023-03-24

## 2023-03-24 LAB
CREATININE (EXTERNAL): 1.71 MG/DL (ref 0.7–1.2)
GFR ESTIMATED (EXTERNAL): 44 ML/MIN/1.73M2
GLUCOSE (EXTERNAL): 176 MG/DL (ref 70–99)
POTASSIUM (EXTERNAL): 4.3 MEQ/L (ref 3.4–5.1)

## (undated) DEVICE — DRSG KERLIX 4 1/2"X4YDS ROLL 6715

## (undated) DEVICE — DRSG ABDOMINAL PAD UNSTERILE 5X9" 9190

## (undated) DEVICE — SOL WATER 1500ML

## (undated) DEVICE — COVER LIGHT HANDLE LT-F02

## (undated) DEVICE — SUTURE 2-0 VICRYL TIES J911T

## (undated) DEVICE — DRSG XEROFORM 1X8"

## (undated) DEVICE — SUCTION TIP YANKAUER W/O VENT K86

## (undated) DEVICE — SU VICRYL 1 CT-1 CR 8X18" J741D

## (undated) DEVICE — BNDG ELASTIC 4" DBL LENGTH UNSTERILE 6611-14

## (undated) DEVICE — SU VICRYL 3-0 SH 27" J784G

## (undated) DEVICE — ESU PENCIL SMOKE EVAC W/ROCKER SWITCH 0703-047-000

## (undated) DEVICE — SU VICRYL 0 CT-1 CR 8X18" J740D

## (undated) DEVICE — GLOVE PROTEXIS POWDER FREE SMT 7.5  2D72PT75X

## (undated) DEVICE — PACK MAJOR EXTREMITY SOP15MEFCA

## (undated) DEVICE — SPONGE LAP 18X18" 23250-400

## (undated) DEVICE — SU VICRYL 2-0 CT-2 CR 8X18" J726D

## (undated) DEVICE — GLOVE BIOGEL INDICATOR 7.5 LF 41675

## (undated) DEVICE — PREP SKIN SCRUB TRAY 4461A

## (undated) DEVICE — BLADE KNIFE SURG 15 SAFETY 373915

## (undated) DEVICE — DRSG KERLIX SUPER SPONGE 7310

## (undated) DEVICE — SU ETHILON 2-0 FS 18" 664G

## (undated) RX ORDER — PROPOFOL 10 MG/ML
INJECTION, EMULSION INTRAVENOUS
Status: DISPENSED
Start: 2021-08-28

## (undated) RX ORDER — LIDOCAINE HYDROCHLORIDE 20 MG/ML
INJECTION, SOLUTION EPIDURAL; INFILTRATION; INTRACAUDAL; PERINEURAL
Status: DISPENSED
Start: 2021-08-28

## (undated) RX ORDER — VANCOMYCIN HYDROCHLORIDE 1 G/20ML
INJECTION, POWDER, LYOPHILIZED, FOR SOLUTION INTRAVENOUS
Status: DISPENSED
Start: 2021-08-21

## (undated) RX ORDER — ONDANSETRON 2 MG/ML
INJECTION INTRAMUSCULAR; INTRAVENOUS
Status: DISPENSED
Start: 2021-08-28

## (undated) RX ORDER — LIDOCAINE HYDROCHLORIDE 20 MG/ML
JELLY TOPICAL
Status: DISPENSED
Start: 2022-12-04

## (undated) RX ORDER — BUPIVACAINE HYDROCHLORIDE 2.5 MG/ML
INJECTION, SOLUTION EPIDURAL; INFILTRATION; INTRACAUDAL
Status: DISPENSED
Start: 2021-08-28

## (undated) RX ORDER — DEXMEDETOMIDINE HYDROCHLORIDE 100 UG/ML
INJECTION, SOLUTION INTRAVENOUS
Status: DISPENSED
Start: 2021-08-28

## (undated) RX ORDER — BUPIVACAINE HYDROCHLORIDE 5 MG/ML
INJECTION, SOLUTION EPIDURAL; INTRACAUDAL
Status: DISPENSED
Start: 2021-08-28